# Patient Record
Sex: MALE | Race: WHITE | Employment: UNEMPLOYED | URBAN - METROPOLITAN AREA
[De-identification: names, ages, dates, MRNs, and addresses within clinical notes are randomized per-mention and may not be internally consistent; named-entity substitution may affect disease eponyms.]

---

## 2018-09-11 ENCOUNTER — HOSPITAL ENCOUNTER (INPATIENT)
Facility: CLINIC | Age: 33
LOS: 7 days | Discharge: PSYCHIATRIC OR SUBSTANCE USE FACILITY | DRG: 433 | End: 2018-09-18
Attending: EMERGENCY MEDICINE | Admitting: INTERNAL MEDICINE
Payer: COMMERCIAL

## 2018-09-11 ENCOUNTER — TRANSFERRED RECORDS (OUTPATIENT)
Dept: HEALTH INFORMATION MANAGEMENT | Facility: CLINIC | Age: 33
End: 2018-09-11

## 2018-09-11 DIAGNOSIS — F41.9 ANXIETY: ICD-10-CM

## 2018-09-11 DIAGNOSIS — E87.29 ALCOHOLIC KETOACIDOSIS: ICD-10-CM

## 2018-09-11 DIAGNOSIS — F32.0 MILD MAJOR DEPRESSION (H): ICD-10-CM

## 2018-09-11 DIAGNOSIS — F43.21 ADJUSTMENT DISORDER WITH DEPRESSED MOOD: ICD-10-CM

## 2018-09-11 DIAGNOSIS — E87.20 ACIDOSIS, METABOLIC: Primary | ICD-10-CM

## 2018-09-11 DIAGNOSIS — F10.939 ALCOHOL WITHDRAWAL SYNDROME WITH COMPLICATION (H): ICD-10-CM

## 2018-09-11 DIAGNOSIS — R03.0 ELEVATED BLOOD PRESSURE READING WITHOUT DIAGNOSIS OF HYPERTENSION: ICD-10-CM

## 2018-09-11 LAB
ALBUMIN SERPL-MCNC: 4.8 G/DL (ref 3.4–5)
ALBUMIN UR-MCNC: 30 MG/DL
ALCOHOL BREATH TEST: 0.07 (ref 0–0.01)
ALP SERPL-CCNC: 72 U/L (ref 40–150)
ALT SERPL W P-5'-P-CCNC: 106 U/L (ref 0–70)
AMPHETAMINES UR QL SCN: NEGATIVE
ANION GAP SERPL CALCULATED.3IONS-SCNC: 15 MMOL/L (ref 3–14)
ANION GAP SERPL CALCULATED.3IONS-SCNC: 22 MMOL/L (ref 3–14)
ANION GAP SERPL CALCULATED.3IONS-SCNC: 9 MMOL/L (ref 3–14)
APPEARANCE UR: CLEAR
AST SERPL W P-5'-P-CCNC: 86 U/L (ref 0–45)
BACTERIA #/AREA URNS HPF: ABNORMAL /HPF
BARBITURATES UR QL: NEGATIVE
BASOPHILS # BLD AUTO: 0 10E9/L (ref 0–0.2)
BASOPHILS NFR BLD AUTO: 0.6 %
BENZODIAZ UR QL: NEGATIVE
BILIRUB SERPL-MCNC: 1.4 MG/DL (ref 0.2–1.3)
BILIRUB UR QL STRIP: NEGATIVE
BUN SERPL-MCNC: 10 MG/DL (ref 7–30)
BUN SERPL-MCNC: 8 MG/DL (ref 7–30)
BUN SERPL-MCNC: 9 MG/DL (ref 7–30)
CALCIUM SERPL-MCNC: 7.9 MG/DL (ref 8.5–10.1)
CALCIUM SERPL-MCNC: 8.6 MG/DL (ref 8.5–10.1)
CALCIUM SERPL-MCNC: 9.8 MG/DL (ref 8.5–10.1)
CANNABINOIDS UR QL SCN: NEGATIVE
CHLORIDE SERPL-SCNC: 100 MMOL/L (ref 94–109)
CHLORIDE SERPL-SCNC: 105 MMOL/L (ref 94–109)
CHLORIDE SERPL-SCNC: 95 MMOL/L (ref 94–109)
CO2 SERPL-SCNC: 18 MMOL/L (ref 20–32)
CO2 SERPL-SCNC: 18 MMOL/L (ref 20–32)
CO2 SERPL-SCNC: 25 MMOL/L (ref 20–32)
COCAINE UR QL: NEGATIVE
COLOR UR AUTO: YELLOW
CREAT SERPL-MCNC: 0.77 MG/DL (ref 0.66–1.25)
CREAT SERPL-MCNC: 0.92 MG/DL (ref 0.66–1.25)
CREAT SERPL-MCNC: 0.93 MG/DL (ref 0.66–1.25)
DIFFERENTIAL METHOD BLD: NORMAL
EOSINOPHIL # BLD AUTO: 0 10E9/L (ref 0–0.7)
EOSINOPHIL NFR BLD AUTO: 0 %
ERYTHROCYTE [DISTWIDTH] IN BLOOD BY AUTOMATED COUNT: 12.2 % (ref 10–15)
ETHANOL SERPL-MCNC: <0.01 G/DL
ETHANOL UR QL SCN: NEGATIVE
GFR SERPL CREATININE-BSD FRML MDRD: >90 ML/MIN/1.7M2
GLUCOSE BLDC GLUCOMTR-MCNC: 241 MG/DL (ref 70–99)
GLUCOSE BLDC GLUCOMTR-MCNC: 318 MG/DL (ref 70–99)
GLUCOSE SERPL-MCNC: 135 MG/DL (ref 70–99)
GLUCOSE SERPL-MCNC: 446 MG/DL (ref 70–99)
GLUCOSE SERPL-MCNC: 65 MG/DL (ref 70–99)
GLUCOSE UR STRIP-MCNC: NEGATIVE MG/DL
HBA1C MFR BLD: 4.4 % (ref 0–5.6)
HCT VFR BLD AUTO: 47 % (ref 40–53)
HGB BLD-MCNC: 16.4 G/DL (ref 13.3–17.7)
HGB UR QL STRIP: NEGATIVE
HYALINE CASTS #/AREA URNS LPF: 1 /LPF (ref 0–2)
IMM GRANULOCYTES # BLD: 0 10E9/L (ref 0–0.4)
IMM GRANULOCYTES NFR BLD: 0.1 %
INR PPP: 0.98 (ref 0.86–1.14)
KETONES BLD-SCNC: 3.8 MMOL/L (ref 0–0.6)
KETONES UR STRIP-MCNC: >150 MG/DL
LEUKOCYTE ESTERASE UR QL STRIP: NEGATIVE
LIPASE SERPL-CCNC: 152 U/L (ref 73–393)
LYMPHOCYTES # BLD AUTO: 0.8 10E9/L (ref 0.8–5.3)
LYMPHOCYTES NFR BLD AUTO: 10.7 %
MAGNESIUM SERPL-MCNC: 1.6 MG/DL (ref 1.6–2.3)
MAGNESIUM SERPL-MCNC: 1.7 MG/DL (ref 1.6–2.3)
MCH RBC QN AUTO: 32.9 PG (ref 26.5–33)
MCHC RBC AUTO-ENTMCNC: 34.9 G/DL (ref 31.5–36.5)
MCV RBC AUTO: 94 FL (ref 78–100)
MONOCYTES # BLD AUTO: 0.5 10E9/L (ref 0–1.3)
MONOCYTES NFR BLD AUTO: 6.5 %
MUCOUS THREADS #/AREA URNS LPF: PRESENT /LPF
NEUTROPHILS # BLD AUTO: 5.8 10E9/L (ref 1.6–8.3)
NEUTROPHILS NFR BLD AUTO: 82.1 %
NITRATE UR QL: NEGATIVE
NRBC # BLD AUTO: 0 10*3/UL
NRBC BLD AUTO-RTO: 0 /100
OPIATES UR QL SCN: NEGATIVE
PH UR STRIP: 5.5 PH (ref 5–7)
PHOSPHATE SERPL-MCNC: 1.1 MG/DL (ref 2.5–4.5)
PHOSPHATE SERPL-MCNC: 1.7 MG/DL (ref 2.5–4.5)
PLATELET # BLD AUTO: 219 10E9/L (ref 150–450)
POTASSIUM SERPL-SCNC: 3.7 MMOL/L (ref 3.4–5.3)
POTASSIUM SERPL-SCNC: 4.3 MMOL/L (ref 3.4–5.3)
POTASSIUM SERPL-SCNC: 4.3 MMOL/L (ref 3.4–5.3)
PROT SERPL-MCNC: 8.6 G/DL (ref 6.8–8.8)
RBC # BLD AUTO: 4.98 10E12/L (ref 4.4–5.9)
RBC #/AREA URNS AUTO: 1 /HPF (ref 0–2)
SODIUM SERPL-SCNC: 133 MMOL/L (ref 133–144)
SODIUM SERPL-SCNC: 135 MMOL/L (ref 133–144)
SODIUM SERPL-SCNC: 139 MMOL/L (ref 133–144)
SOURCE: ABNORMAL
SP GR UR STRIP: 1.03 (ref 1–1.03)
UROBILINOGEN UR STRIP-MCNC: NORMAL MG/DL (ref 0–2)
WBC # BLD AUTO: 7 10E9/L (ref 4–11)
WBC #/AREA URNS AUTO: 2 /HPF (ref 0–5)

## 2018-09-11 PROCEDURE — 82075 ASSAY OF BREATH ETHANOL: CPT | Performed by: EMERGENCY MEDICINE

## 2018-09-11 PROCEDURE — 00000146 ZZHCL STATISTIC GLUCOSE BY METER IP

## 2018-09-11 PROCEDURE — 83735 ASSAY OF MAGNESIUM: CPT | Performed by: EMERGENCY MEDICINE

## 2018-09-11 PROCEDURE — 80307 DRUG TEST PRSMV CHEM ANLYZR: CPT | Performed by: EMERGENCY MEDICINE

## 2018-09-11 PROCEDURE — 96365 THER/PROPH/DIAG IV INF INIT: CPT | Performed by: EMERGENCY MEDICINE

## 2018-09-11 PROCEDURE — 85610 PROTHROMBIN TIME: CPT | Performed by: EMERGENCY MEDICINE

## 2018-09-11 PROCEDURE — 84100 ASSAY OF PHOSPHORUS: CPT | Performed by: EMERGENCY MEDICINE

## 2018-09-11 PROCEDURE — 83036 HEMOGLOBIN GLYCOSYLATED A1C: CPT | Performed by: EMERGENCY MEDICINE

## 2018-09-11 PROCEDURE — 25000128 H RX IP 250 OP 636: Performed by: EMERGENCY MEDICINE

## 2018-09-11 PROCEDURE — 99285 EMERGENCY DEPT VISIT HI MDM: CPT | Mod: Z6 | Performed by: EMERGENCY MEDICINE

## 2018-09-11 PROCEDURE — 83690 ASSAY OF LIPASE: CPT | Performed by: EMERGENCY MEDICINE

## 2018-09-11 PROCEDURE — 85025 COMPLETE CBC W/AUTO DIFF WBC: CPT | Performed by: EMERGENCY MEDICINE

## 2018-09-11 PROCEDURE — 12000001 ZZH R&B MED SURG/OB UMMC

## 2018-09-11 PROCEDURE — 96376 TX/PRO/DX INJ SAME DRUG ADON: CPT | Performed by: EMERGENCY MEDICINE

## 2018-09-11 PROCEDURE — 80320 DRUG SCREEN QUANTALCOHOLS: CPT | Performed by: EMERGENCY MEDICINE

## 2018-09-11 PROCEDURE — 81001 URINALYSIS AUTO W/SCOPE: CPT | Performed by: EMERGENCY MEDICINE

## 2018-09-11 PROCEDURE — 80048 BASIC METABOLIC PNL TOTAL CA: CPT | Performed by: INTERNAL MEDICINE

## 2018-09-11 PROCEDURE — 82010 KETONE BODYS QUAN: CPT | Performed by: EMERGENCY MEDICINE

## 2018-09-11 PROCEDURE — 84100 ASSAY OF PHOSPHORUS: CPT | Performed by: INTERNAL MEDICINE

## 2018-09-11 PROCEDURE — HZ2ZZZZ DETOXIFICATION SERVICES FOR SUBSTANCE ABUSE TREATMENT: ICD-10-PCS | Performed by: INTERNAL MEDICINE

## 2018-09-11 PROCEDURE — 25000125 ZZHC RX 250: Performed by: EMERGENCY MEDICINE

## 2018-09-11 PROCEDURE — 83735 ASSAY OF MAGNESIUM: CPT | Performed by: INTERNAL MEDICINE

## 2018-09-11 PROCEDURE — 80048 BASIC METABOLIC PNL TOTAL CA: CPT | Performed by: EMERGENCY MEDICINE

## 2018-09-11 PROCEDURE — 36415 COLL VENOUS BLD VENIPUNCTURE: CPT | Performed by: INTERNAL MEDICINE

## 2018-09-11 PROCEDURE — 80053 COMPREHEN METABOLIC PANEL: CPT | Performed by: EMERGENCY MEDICINE

## 2018-09-11 PROCEDURE — 99222 1ST HOSP IP/OBS MODERATE 55: CPT | Mod: AI | Performed by: INTERNAL MEDICINE

## 2018-09-11 PROCEDURE — 99285 EMERGENCY DEPT VISIT HI MDM: CPT | Mod: 25 | Performed by: EMERGENCY MEDICINE

## 2018-09-11 PROCEDURE — 96361 HYDRATE IV INFUSION ADD-ON: CPT | Performed by: EMERGENCY MEDICINE

## 2018-09-11 PROCEDURE — 96375 TX/PRO/DX INJ NEW DRUG ADDON: CPT | Performed by: EMERGENCY MEDICINE

## 2018-09-11 PROCEDURE — 25000132 ZZH RX MED GY IP 250 OP 250 PS 637: Performed by: INTERNAL MEDICINE

## 2018-09-11 RX ORDER — ONDANSETRON 4 MG/1
4 TABLET, ORALLY DISINTEGRATING ORAL EVERY 4 HOURS PRN
Status: DISCONTINUED | OUTPATIENT
Start: 2018-09-11 | End: 2018-09-18 | Stop reason: HOSPADM

## 2018-09-11 RX ORDER — MULTIPLE VITAMINS W/ MINERALS TAB 9MG-400MCG
1 TAB ORAL DAILY
Status: DISCONTINUED | OUTPATIENT
Start: 2018-09-12 | End: 2018-09-18 | Stop reason: HOSPADM

## 2018-09-11 RX ORDER — LORAZEPAM 2 MG/ML
1 INJECTION INTRAMUSCULAR ONCE
Status: COMPLETED | OUTPATIENT
Start: 2018-09-11 | End: 2018-09-11

## 2018-09-11 RX ORDER — FOLIC ACID 1 MG/1
1 TABLET ORAL DAILY
Status: DISCONTINUED | OUTPATIENT
Start: 2018-09-12 | End: 2018-09-18 | Stop reason: HOSPADM

## 2018-09-11 RX ORDER — LORAZEPAM 1 MG/1
1-4 TABLET ORAL EVERY 30 MIN PRN
Status: DISCONTINUED | OUTPATIENT
Start: 2018-09-11 | End: 2018-09-18 | Stop reason: HOSPADM

## 2018-09-11 RX ORDER — DIAZEPAM 5 MG
5-20 TABLET ORAL EVERY 30 MIN PRN
Status: DISCONTINUED | OUTPATIENT
Start: 2018-09-11 | End: 2018-09-12

## 2018-09-11 RX ORDER — LANOLIN ALCOHOL/MO/W.PET/CERES
100 CREAM (GRAM) TOPICAL DAILY
Status: COMPLETED | OUTPATIENT
Start: 2018-09-12 | End: 2018-09-14

## 2018-09-11 RX ORDER — ONDANSETRON 2 MG/ML
8 INJECTION INTRAMUSCULAR; INTRAVENOUS ONCE
Status: COMPLETED | OUTPATIENT
Start: 2018-09-11 | End: 2018-09-11

## 2018-09-11 RX ORDER — SODIUM CHLORIDE 9 MG/ML
1000 INJECTION, SOLUTION INTRAVENOUS CONTINUOUS
Status: DISCONTINUED | OUTPATIENT
Start: 2018-09-11 | End: 2018-09-12

## 2018-09-11 RX ADMIN — LORAZEPAM 1 MG: 2 INJECTION INTRAMUSCULAR; INTRAVENOUS at 12:42

## 2018-09-11 RX ADMIN — FOLIC ACID: 5 INJECTION, SOLUTION INTRAMUSCULAR; INTRAVENOUS; SUBCUTANEOUS at 12:43

## 2018-09-11 RX ADMIN — SODIUM CHLORIDE 1000 ML: 9 INJECTION, SOLUTION INTRAVENOUS at 16:51

## 2018-09-11 RX ADMIN — SODIUM CHLORIDE 1000 ML: 9 INJECTION, SOLUTION INTRAVENOUS at 16:06

## 2018-09-11 RX ADMIN — FAMOTIDINE 20 MG: 10 INJECTION, SOLUTION INTRAVENOUS at 12:42

## 2018-09-11 RX ADMIN — ONDANSETRON 8 MG: 2 INJECTION INTRAMUSCULAR; INTRAVENOUS at 12:42

## 2018-09-11 RX ADMIN — LORAZEPAM 1 MG: 2 INJECTION INTRAMUSCULAR; INTRAVENOUS at 14:06

## 2018-09-11 RX ADMIN — DEXTROSE AND SODIUM CHLORIDE: 5; 900 INJECTION, SOLUTION INTRAVENOUS at 14:08

## 2018-09-11 RX ADMIN — RANITIDINE 150 MG: 150 TABLET ORAL at 19:46

## 2018-09-11 ASSESSMENT — ACTIVITIES OF DAILY LIVING (ADL)
DRESS: 0-->INDEPENDENT
AMBULATION: 0 - INDEPENDENT
RETIRED_COMMUNICATION: 0-->UNDERSTANDS/COMMUNICATES WITHOUT DIFFICULTY
TRANSFERRING: 0-->INDEPENDENT
TOILETING: 0-->INDEPENDENT
RETIRED_EATING: 0-->INDEPENDENT
AMBULATION: 0-->INDEPENDENT
SWALLOWING: 0-->SWALLOWS FOODS/LIQUIDS WITHOUT DIFFICULTY
BATHING: 0-->INDEPENDENT
ADLS_ACUITY_SCORE: 11

## 2018-09-11 ASSESSMENT — ENCOUNTER SYMPTOMS
BLOOD IN STOOL: 0
NAUSEA: 1
ANAL BLEEDING: 0
FEVER: 0
VOMITING: 1

## 2018-09-11 NOTE — IP AVS SNAPSHOT
MRN:[id number]                      After Visit Summary   9/11/2018    Glenn Simons    MRN: [id number]           Thank you!     Thank you for choosing [facility] for your care. Our goal is always to provide you with excellent care. Hearing back from our patients is one way we can continue to improve our services. Please take a few minutes to complete the written survey that you may receive in the mail after you visit with us. Thank you!        Patient Information     Date Of Birth          1985        Designated Caregiver       Most Recent Value    Caregiver    Will someone help with your care after discharge? yes    Name of designated caregiver wife Netta    Phone number of caregiver [phone]    Caregiver address [address] [place] [address]      About your hospital stay     You were admitted on:  September 11, 2018 You last received care in the:  [facility]    You were discharged on:  September 18, 2018        Reason for your hospital stay       You were hospitalized for the treatment of alcohol abuse, with dehydration, nausea and vomiting                  Who to Call     For medical emergencies, please call 911.  For non-urgent questions about your medical care, please call your primary care provider or clinic, [phone]          Attending Provider     Provider Specialty    Joaquim Spain MD Emergency Medicine    [facility], Samir Shepard MD Internal Medicine       Primary Care Provider Office Phone # Fax #    Ruslan Johnson [phone]      After Care Instructions     Diet       Follow this diet upon discharge: Orders Placed This Encounter      Gluten Free Diet            Discharge Instructions       Monitor BP before taking amlodipine - do not take if /80 or less.  Appointment primary care provider within the week for recheck hepatic profile and BP follow up.                  Follow-up Appointments     Follow Up and recommended labs and tests  "      You should see your primary care provider within one month to blood tests to check your liver function                  Your next 10 appointments already scheduled     Sep 18, 2018 12:00 PM CDT   Treatment with LP/DOP ADMISSIONS   [facility] ([facility])    [facility] [address] [address]   [phone]              Pending Results     No orders found from 9/9/2018 to 9/12/2018.            Statement of Approval     Ordered          09/18/18 0956  I have reviewed and agree with all the recommendations and orders detailed in this document.     Approved and electronically signed by:  Felton Branch MD           09/18/18 1047  I have reviewed and agree with all the recommendations and orders detailed in this document.     Approved and electronically signed by:  Felton Branch MD           09/16/18 0832  I have reviewed and agree with all the recommendations and orders detailed in this document.     Approved and electronically signed by:  Samir Starks MD           09/14/18 5590  I have reviewed and agree with all the recommendations and orders detailed in this document.  EFFECTIVE NOW     Approved and electronically signed by:  Samir Starks MD             Admission Information     Date & Time Provider Department Dept. Phone    9/11/2018 Samir Starks MD [facility] [phone]      Your Vitals Were     Blood Pressure Pulse Temperature Respirations Height Weight    142/99 (BP Location: Right arm) 66 97.1  F (36.2  C) 16 1.778 m (5' 10\") 63.5 kg (140 lb)    Pulse Oximetry BMI (Body Mass Index)                99% 20.09 kg/m2          [employer] Information     [employer] lets you send messages to your doctor, view your test results, renew your prescriptions, schedule appointments and more. To sign up, go to [email or web address] . Click on \"Log in\" on the left side of the screen, which will take you to the " "Welcome page. Then click on \"Sign up Now\" on the right side of the page.     You will be asked to enter the access code listed below, as well as some personal information. Please follow the directions to create your username and password.     Your access code is: [email or web address]  Expires: 12/10/2018  6:03 PM     Your access code will  in 90 days. If you need help or a new code, please call your [facility] clinic or [phone].        Care EveryWhere ID     This is your Care EveryWhere ID. This could be used by other organizations to access your [facility] medical records  [id number]        Equal Access to Services     CHULA REYES : Kraig Waggoner, lenin aquino, jacquie haji, ronn olivera. So [facility] [phone].    ATENCIÓN: Si habla español, tiene a buchanan disposición servicios gratuitos de asistencia lingüística. Llame al [phone].    We comply with applicable federal civil rights laws and [place] laws. We do not discriminate on the basis of race, color, national origin, age, disability, sex, sexual orientation, or gender identity.               Review of your medicines      START taking        Dose / Directions    amLODIPine 5 MG tablet   Commonly known as:  NORVASC   Used for:  Elevated blood pressure reading without diagnosis of hypertension        Dose:  5 mg   Start taking on:  2018   Take 1 tablet (5 mg) by mouth daily   Quantity:  30 tablet   Refills:  0       escitalopram 10 MG tablet   Commonly known as:  LEXAPRO   Used for:  Mild major depression (H)        Dose:  10 mg   Take 1 tablet (10 mg) by mouth daily   Quantity:  30 tablet   Refills:  1       hydrOXYzine 25 MG tablet   Commonly known as:  ATARAX   Used for:  Anxiety        Dose:  25 mg   Take 1 tablet (25 mg) by mouth every 6 hours as needed for anxiety or other (adjuvant pain)   Quantity:  15 tablet   Refills:  0       multivitamin, therapeutic with minerals Tabs tablet        " Dose:  1 tablet   Take 1 tablet by mouth daily   Quantity:  30 each   Refills:  0       thiamine 100 MG tablet        Dose:  100 mg   Take 1 tablet (100 mg) by mouth daily   Quantity:  30 tablet   Refills:  0            Where to get your medicines      These medications were sent to [facility] [place], [place] - [address]  [address], [facility] [address]     Phone:  [phone]     amLODIPine 5 MG tablet    escitalopram 10 MG tablet    hydrOXYzine 25 MG tablet    multivitamin, therapeutic with minerals Tabs tablet    thiamine 100 MG tablet                Protect others around you: Learn how to safely use, store and throw away your medicines at www.disposemymeds.org.             Medication List: This is a list of all your medications and when to take them. Check marks below indicate your daily home schedule. Keep this list as a reference.      Medications           Morning Afternoon Evening Bedtime As Needed    amLODIPine 5 MG tablet   Commonly known as:  NORVASC   Take 1 tablet (5 mg) by mouth daily   Start taking on:  9/19/2018   Last time this was given:  5 mg on 9/18/2018  8:22 AM                                escitalopram 10 MG tablet   Commonly known as:  LEXAPRO   Take 1 tablet (10 mg) by mouth daily   Last time this was given:  10 mg on 9/18/2018  8:19 AM                                hydrOXYzine 25 MG tablet   Commonly known as:  ATARAX   Take 1 tablet (25 mg) by mouth every 6 hours as needed for anxiety or other (adjuvant pain)   Last time this was given:  25 mg on 9/17/2018 11:39 AM                                multivitamin, therapeutic with minerals Tabs tablet   Take 1 tablet by mouth daily   Last time this was given:  1 tablet on 9/18/2018  8:19 AM                                thiamine 100 MG tablet   Take 1 tablet (100 mg) by mouth daily   Last time this was given:  100 mg on 9/16/2018  8:08 AM

## 2018-09-11 NOTE — H&P
"Admitted:     09/11/2018      Glenn Simons is a 32-year-old male with a history of chemical abuse involving alcohol, as well as celiac disease, who is being admitted from the emergency room to the medical unit for evaluation of nausea, vomiting, dehydration and metabolic acidosis in the setting of ongoing alcohol use.  The patient states he drinks approximately 1.75 liter of maude every 3 days and has done so for several years.  His last period of abstinence was in April when he was in detox for several days.  He has been through treatment on one occasion several years ago with only a temporary abstinence.      The patient states that he strongly desires chemical dependency treatment to stop drinking.  He was evaluated, in fact, this morning at [facility] for chemical dependency.  He was told that they did not have room for him for several weeks.  He subsequently went home and drank more alcohol.  His last use of alcohol was approximately 10:30 this morning.  The patient subsequently had several episodes of emesis after which his wife brought him to the emergency room.  The wife states that \"I have had enough.\"  In the ER, the patient was found to have an anion gap of 22, a bicarbonate of 18, creatinine 0.92, potassium 4.3, AST was 86, ALT of 106.  Glucose was 65.  Lipase 152.  The patient was given 2 liters of dextrose containing fluids after which his glucose was noted to be in the 300s.  Anion gap subsequently narrowed from 22 to 15.  Quantitative ketones at that time were elevated at 3.8.      The patient was seen by me after he had received 2 liters of fluid.  He states he feels improved.  Denies nausea and now states he is hungry.  He states that the nausea, vomiting started this morning.  He does have poor appetite with occasional nausea as well as occasional diarrhea which he believes is related to alcohol use and less likely to celiac disease.  He denies knowledge of history of liver dysfunction, " pancreatitis, alcohol withdrawal seizures.  He states his appetite has been poor and that he has lost unknown amount of weight over the last year, he presumes, secondary to poor diet in the setting of alcohol use.  The patient states that he does feel depressed.  He and his wife feel this is likely secondary to alcohol use and to his chronic diagnosis of celiac disease and that he is not able to eat foods that he enjoys.      As above, the patient and his wife are very interested in his pursuing CD treatment.      PAST MEDICAL HISTORY:  The patient's past medical history is remarkable for   1.  Chemical abuse involving alcohol as noted above.   2.  Celiac disease with sequellae of intermittent diarrhea.   3.  History of hemorrhoids.   4.  Cigarette abuse.  The patient denies knowledge of cardiac, pulmonary disease.      MEDICATIONS PRIOR TO ADMISSION:  None.      ALLERGIES TO MEDICATION ALLERGIES:  AMOXICILLIN, TYLENOL WITH CODEINE, PLAIN TYLENOL.      HABITS:  He does smoke cigarettes, approximately 3-4 cigarettes per day.  Denies other chemical use.      FAMILY HISTORY:  The patient notes that multiple relatives have had chemical dependency issues.  His parents, he states are social drinkers.  His brother has a gambling addiction, which is under control.      REVIEW OF SYSTEMS:  A 12-point review of systems is negative except as noted above.      PHYSICAL EXAMINATION:   GENERAL:  He is a pleasant, thin appearing male who is lying comfortably in bed in the emergency room.   VITAL SIGNS:  His temperature is 99.2 degrees, blood pressure has been in the 140s to 160s over 190 to 100s.  Heart rates have been in the 90s.   HEENT:  Pupils are 2.5 mm bilaterally.  Pharynx benign.  Sclerae are anicteric.  Oral mucosa is moist.  Teeth are in fair repair.   NECK:  There is no adenopathy or thyromegaly.   LUNGS:  Clear.   CARDIAC:  Regular rate and rhythm without murmurs.   ABDOMEN:  Soft, nontender, nondistended, no  organomegaly.   EXTREMITIES:  There was no sign of trauma.   NEUROLOGIC:  He is alert, pleasant and cooperative, fully oriented.  There was a faint tremor of both hands.  Insight appears to be reasonably good.      LABORATORY STUDIES:  As described above.  CBC is unremarkable.  INR 0.98.  Bilirubin was slightly elevated at 1.4.      ASSESSMENT:   1.  Nausea, vomiting, anion gap acidosis, likely representing alcoholic starvation ketosis in the setting of ongoing alcohol use.  Anion gap has narrowed somewhat with dextrose.  He does have hyperglycemia, most likely as a result of IV dextrose, particularly due to the fact that his glucose is 65 prior to the receipt of IV fluids.  Liver function tests are mildly abnormal as noted above.  INR is normal.  The patient is hemodynamically stable and does appear improved after receiving 2 liters of fluid.  There is currently no evidence of alcohol withdrawal.  The patient denies a history of significant alcohol withdrawal.   2.  Depression in the setting of chronic alcohol use.  The patient would be interested in seeing a psychiatrist.   3.  Celiac disease managed with diet.      PLAN:  The patient is being admitted to the medical unit.  He will be continued on IV dextrose.  A BMP will be repeated later today to assure continued normalization of the anion gap and to monitor his blood glucose.  He will be started on a gluten-free diet.  He will be started empirically on H2 blocker therapy and be started on MSSA alcohol withdrawal protocol with lorazepam.  Activity will be up as tolerated with assistance.  The patient will benefit from a psychiatry consultation.  The chemical dependency consultation will be requested as well as the patient and wife are very interested in his pursuing chemical dependency treatment.      My assessment and plan were discussed with the patient and his wife, both of whom are in agreement.  The patient will be admitted on a voluntary basis to the  medical unit.         MARIN KIM MD             D: 2018   T: 2018   MT: JIGAR      Name:     JAMES LOW   MRN:      [id number]        Account:      [id number]   :      1985        Admitted:     2018                   Document: [id number]

## 2018-09-11 NOTE — IP AVS SNAPSHOT
[facility]    [address]    [place] [address]    Phone:  [phone]                                       After Visit Summary   9/11/2018    Glenn Simons    MRN: [id number]           After Visit Summary Signature Page     I have received my discharge instructions, and my questions have been answered. I have discussed any challenges I see with this plan with the nurse or doctor.    ..........................................................................................................................................  Patient/Patient Representative Signature      ..........................................................................................................................................  Patient Representative Print Name and Relationship to Patient    ..................................................               ................................................  Date                                   Time    ..........................................................................................................................................  Reviewed by Signature/Title    ...................................................              ..............................................  Date                                               Time          22EPIC Rev 08/18

## 2018-09-11 NOTE — ED PROVIDER NOTES
History     Chief Complaint   Patient presents with     Nausea & Vomiting     pt c/o vomiting, started within the last several hours, pt left chemical dependency evaluation, went home and started vomiting, denies any blood. last ETOH drink 30min PTA 1100. pt alert, with tremors.      Alcohol Intoxication     HPI  Glenn Simons is a 32-year-old male who presents with his significant other for evaluation of his nausea and vomiting.  The patient states that he just had an evaluation at [facility] this morning for chemical dependency program.  He had blood drawn and was told that they would not have room for him for approximately 3 weeks.  Patient states he has been drinking 1.75 L of maude about every 3 days.  Patient states he has been doing this for a number of years and states he has never had any complications of pancreatitis or hepatitis that he is aware of.  Patient states that he got home from [place] this morning and started to vomit.  He denies hematemesis denies any melena or bright blood per rectum.  The patient states that his last drink was prior to going to [place] this morning approximately 5 hours ago.  Patient states that he has been through treatment one other time in the past but has been drinking for a number of years.  He denies any other recreational drug use.  He denies any suicidal or homicidal ideation.    I have reviewed the Medications, Allergies, Past Medical and Surgical History, and Social History in the [employer] system.    Past Medical History:   Diagnosis Date     Celiac disease        No past surgical history on file.    No family history on file.    Social History   Substance Use Topics     Smoking status: Current Every Day Smoker     Packs/day: 0.25     Years: 10.00     Smokeless tobacco: Not on file     Alcohol use Yes      Comment: Hasn't drank since 12/13/15, Drank 0.5L daily prior to that for 3+ years        Previous Medications    No medications on file        Allergies  "  Allergen Reactions     Amoxicillin Unknown     Tylenol W/Codeine [Acetaminophen-Codeine] Unknown     Tylenol [Acetaminophen] Unknown     Wheat Bran GI Disturbance      Review of Systems   Constitutional: Negative for fever.   Gastrointestinal: Positive for nausea and vomiting. Negative for anal bleeding and blood in stool.   Psychiatric/Behavioral: Negative for self-injury and suicidal ideas.   All other systems reviewed and are negative.    Physical Exam   BP: (!) 163/115  Pulse: 96  Temp: 99.2  F (37.3  C)  Resp: 22  Height: 177.8 cm (5' 10\")  Weight: 63.5 kg (140 lb)  SpO2: 96 %    Physical Exam   Constitutional: He is oriented to person, place, and time.   Alert conversant tremulous   HENT:   Head: Atraumatic.   Eyes: EOM are normal. Pupils are equal, round, and reactive to light.   Neck: Neck supple.   Cardiovascular: Regular rhythm.    Pulmonary/Chest: Breath sounds normal.   Abdominal: Soft. There is no tenderness.   Musculoskeletal: He exhibits no edema or tenderness.   Neurological: He is alert and oriented to person, place, and time. No cranial nerve deficit.   Tremulous bilaterally with tongue fasciculations as well   Skin: Skin is warm.   Psychiatric: He has a normal mood and affect.       ED Course     ED Course     Procedures          Patient an IV placed for medications and IV fluids.    Results for orders placed or performed during the hospital encounter of 09/11/18   CBC with platelets differential   Result Value Ref Range    WBC 7.0 4.0 - 11.0 10e9/L    RBC Count 4.98 4.4 - 5.9 10e12/L    Hemoglobin 16.4 13.3 - 17.7 g/dL    Hematocrit 47.0 40.0 - 53.0 %    MCV 94 78 - 100 fl    MCH 32.9 26.5 - 33.0 pg    MCHC 34.9 31.5 - 36.5 g/dL    RDW 12.2 10.0 - 15.0 %    Platelet Count 219 150 - 450 10e9/L    Diff Method Automated Method     % Neutrophils 82.1 %    % Lymphocytes 10.7 %    % Monocytes 6.5 %    % Eosinophils 0.0 %    % Basophils 0.6 %    % Immature Granulocytes 0.1 %    Nucleated RBCs 0 0 /100 "    Absolute Neutrophil 5.8 1.6 - 8.3 10e9/L    Absolute Lymphocytes 0.8 0.8 - 5.3 10e9/L    Absolute Monocytes 0.5 0.0 - 1.3 10e9/L    Absolute Eosinophils 0.0 0.0 - 0.7 10e9/L    Absolute Basophils 0.0 0.0 - 0.2 10e9/L    Abs Immature Granulocytes 0.0 0 - 0.4 10e9/L    Absolute Nucleated RBC 0.0    INR   Result Value Ref Range    INR 0.98 0.86 - 1.14   Comprehensive metabolic panel   Result Value Ref Range    Sodium 135 133 - 144 mmol/L    Potassium 4.3 3.4 - 5.3 mmol/L    Chloride 95 94 - 109 mmol/L    Carbon Dioxide 18 (L) 20 - 32 mmol/L    Anion Gap 22 (H) 3 - 14 mmol/L    Glucose 65 (L) 70 - 99 mg/dL    Urea Nitrogen 10 7 - 30 mg/dL    Creatinine 0.92 0.66 - 1.25 mg/dL    GFR Estimate >90 >60 mL/min/1.7m2    GFR Estimate If Black >90 >60 mL/min/1.7m2    Calcium 9.8 8.5 - 10.1 mg/dL    Bilirubin Total 1.4 (H) 0.2 - 1.3 mg/dL    Albumin 4.8 3.4 - 5.0 g/dL    Protein Total 8.6 6.8 - 8.8 g/dL    Alkaline Phosphatase 72 40 - 150 U/L     (H) 0 - 70 U/L    AST 86 (H) 0 - 45 U/L   Lipase   Result Value Ref Range    Lipase 152 73 - 393 U/L   UA reflex to Microscopic and Culture   Result Value Ref Range    Color Urine Yellow     Appearance Urine Clear     Glucose Urine Negative NEG^Negative mg/dL    Bilirubin Urine Negative NEG^Negative    Ketones Urine >150 (A) NEG^Negative mg/dL    Specific Gravity Urine 1.027 1.003 - 1.035    Blood Urine Negative NEG^Negative    pH Urine 5.5 5.0 - 7.0 pH    Protein Albumin Urine 30 (A) NEG^Negative mg/dL    Urobilinogen mg/dL Normal 0.0 - 2.0 mg/dL    Nitrite Urine Negative NEG^Negative    Leukocyte Esterase Urine Negative NEG^Negative    Source Midstream Urine     RBC Urine 1 0 - 2 /HPF    WBC Urine 2 0 - 5 /HPF    Bacteria Urine Few (A) NEG^Negative /HPF    Mucous Urine Present (A) NEG^Negative /LPF    Hyaline Casts 1 0 - 2 /LPF   Drug abuse screen 6 urine (tox)   Result Value Ref Range    Amphetamine Qual Urine Negative NEG^Negative    Barbiturates Qual Urine Negative  NEG^Negative    Benzodiazepine Qual Urine Negative NEG^Negative    Cannabinoids Qual Urine Negative NEG^Negative    Cocaine Qual Urine Negative NEG^Negative    Ethanol Qual Urine Negative NEG^Negative    Opiates Qualitative Urine Negative NEG^Negative   Alcohol level blood   Result Value Ref Range    Ethanol g/dL <0.01 <0.01 g/dL   Basic metabolic panel   Result Value Ref Range    Sodium 133 133 - 144 mmol/L    Potassium 4.3 3.4 - 5.3 mmol/L    Chloride 100 94 - 109 mmol/L    Carbon Dioxide 18 (L) 20 - 32 mmol/L    Anion Gap 15 (H) 3 - 14 mmol/L    Glucose 446 (H) 70 - 99 mg/dL    Urea Nitrogen 9 7 - 30 mg/dL    Creatinine 0.77 0.66 - 1.25 mg/dL    GFR Estimate >90 >60 mL/min/1.7m2    GFR Estimate If Black >90 >60 mL/min/1.7m2    Calcium 7.9 (L) 8.5 - 10.1 mg/dL   Ketone Beta-Hydroxybutyrate Quantitative   Result Value Ref Range    Ketone Quantitative 3.8 (HH) 0.0 - 0.6 mmol/L   Glucose by meter   Result Value Ref Range    Glucose 318 (H) 70 - 99 mg/dL   Alcohol breath test POCT   Result Value Ref Range    Alcohol Breath Test 0.07 (A) 0.00 - 0.01       Labs Ordered and Resulted from Time of ED Arrival Up to the Time of Departure from the ED   COMPREHENSIVE METABOLIC PANEL - Abnormal; Notable for the following:        Result Value    Carbon Dioxide 18 (*)     Anion Gap 22 (*)     Glucose 65 (*)     Bilirubin Total 1.4 (*)      (*)     AST 86 (*)     All other components within normal limits   UA MACROSCOPIC WITH REFLEX TO MICRO AND CULTURE - Abnormal; Notable for the following:     Ketones Urine >150 (*)     Protein Albumin Urine 30 (*)     Bacteria Urine Few (*)     Mucous Urine Present (*)     All other components within normal limits   BASIC METABOLIC PANEL - Abnormal; Notable for the following:     Carbon Dioxide 18 (*)     Anion Gap 15 (*)     Glucose 446 (*)     Calcium 7.9 (*)     All other components within normal limits   KETONE BETA-HYDROXYBUTYRATE QUANTITATIVE - Abnormal; Notable for the following:      Ketone Quantitative 3.8 (*)     All other components within normal limits   ALCOHOL BREATH TEST POCT - Abnormal; Notable for the following:     Alcohol Breath Test 0.07 (*)     All other components within normal limits   CBC WITH PLATELETS DIFFERENTIAL   INR   LIPASE   DRUG ABUSE SCREEN 6 CHEM DEP URINE ([facility])   ALCOHOL ETHYL   GLUCOSE MONITOR NURSING POCT   HEMOGLOBIN A1C   PERIPHERAL IV CATHETER       Assessments & Plan (with Medical Decision Making)     I have reviewed the nursing notes.    Medications   0.9% sodium chloride BOLUS (not administered)   sodium chloride 0.9% infusion (not administered)   insulin aspart (NovoLOG) inj (RAPID ACTING) (not administered)   ondansetron (ZOFRAN) injection 8 mg (8 mg Intravenous Given 9/11/18 1242)   dextrose 5% and 0.9% NaCl 1,000 mL with INFUVITE ADULT 10 mL, thiamine 100 mg, folic acid 1 mg infusion ( Intravenous Stopped 9/11/18 1407)   famotidine (PEPCID) injection 20 mg (20 mg Intravenous Given 9/11/18 1242)   LORazepam (ATIVAN) injection 1 mg (1 mg Intravenous Given 9/11/18 1242)   dextrose 5% and 0.9% NaCl 1,000 mL infusion ( Intravenous Stopped 9/11/18 1521)   LORazepam (ATIVAN) injection 1 mg (1 mg Intravenous Given 9/11/18 1406)       Patient initially had an anion gap of 22 and with his mild alcohol withdrawal was an alcoholic ketoacidosis.  The patient received 2 L of D5 normal saline 1 and some banana bag with vitamins included.  His initial glucose was 65.  Upon recheck his glucose was in the 400s while by Accu-Chek at the bedside it was in the 300s confirming his new hyperglycemia and presumed glucose intolerance.  Hemoglobin A1c was added to the patient's blood work.  At this time with the patient's glucose in the 300s with his anion gap improving from 22 to 15 but with ketones as well, the patient will be admitted to the hospitalist service instead of detox.  He will receive 5 units NovoLog subcu along with additional normal saline to help clear his  ketones and reduce his glucose.    I have reviewed the findings, diagnosis, and plan with the patient.    Final diagnoses:   Alcoholic ketoacidosis     Joaquim Spain MD    I, Sudhir Thomas, am serving as a trained medical scribe to document services personally performed by Joaquim Spain MD, based on the provider's statements to me.   IJoaquim MD, was physically present and have reviewed and verified the accuracy of this note documented by Sudhir Thomas.     9/11/2018   [facility], [place], EMERGENCY DEPARTMENT     Joaquim Spain MD  09/11/18 [id number]

## 2018-09-11 NOTE — PHARMACY-ADMISSION MEDICATION HISTORY
Admission Medication History status for the 9/11/2018 admission is complete.  See EPIC admission navigator for Prior to Admission medications.    Medication history interview sources:  Patient     Medication history source reliability: Good    Patient reports taking no prescription or over-the-counter medications    Time spent in this activity: 5 minutes    Medication history completed by: RENÉE Weaver    Prior to Admission medications    Not on File

## 2018-09-11 NOTE — ED NOTES
[facility], [place]   ED Nurse to Floor Handoff     Glenn Simons is a 32 year old male who speaks English and lives with a spouse,  in a home  They arrived in the ED by car from emergency room    ED Chief Complaint: Nausea & Vomiting (pt c/o vomiting, started within the last several hours, pt left chemical dependency evaluation, went home and started vomiting, denies any blood. last ETOH drink 30min PTA 1100. pt alert, with tremors. ) and Alcohol Intoxication    ED Dx;   Final diagnoses:   Alcoholic ketoacidosis         Needed?: No    Allergies:   Allergies   Allergen Reactions     Amoxicillin Unknown     Tylenol W/Codeine [Acetaminophen-Codeine] Unknown     Tylenol [Acetaminophen] Unknown     Wheat Bran GI Disturbance   .  Past Medical Hx:   Past Medical History:   Diagnosis Date     Celiac disease       Baseline Mental status: WDL  Current Mental Status changes: at basesline    Infection present or suspected this encounter: no  Sepsis suspected: No  Isolation type: No active isolations     Activity level - Baseline/Home:  Independent  Activity Level - Current:   Independent    Bariatric equipment needed?: No    In the ED these meds were given:   Medications   0.9% sodium chloride BOLUS (1,000 mLs Intravenous New Bag 9/11/18 1606)   sodium chloride 0.9% infusion (not administered)   insulin aspart (NovoLOG) inj (RAPID ACTING) (not administered)   ondansetron (ZOFRAN) injection 8 mg (8 mg Intravenous Given 9/11/18 1242)   dextrose 5% and 0.9% NaCl 1,000 mL with INFUVITE ADULT 10 mL, thiamine 100 mg, folic acid 1 mg infusion ( Intravenous Stopped 9/11/18 1407)   famotidine (PEPCID) injection 20 mg (20 mg Intravenous Given 9/11/18 1242)   LORazepam (ATIVAN) injection 1 mg (1 mg Intravenous Given 9/11/18 1242)   dextrose 5% and 0.9% NaCl 1,000 mL infusion ( Intravenous Stopped 9/11/18 1521)   LORazepam (ATIVAN) injection 1 mg (1 mg Intravenous Given 9/11/18 1406)       Drips  "running?  Yes    Home pump  No    Current LDAs  Peripheral IV 09/11/18 Left (Active)   Site Assessment WDL 9/11/2018 12:41 PM   Line Status Saline locked 9/11/2018 12:41 PM   Number of days:0       Labs results:   Labs Ordered and Resulted from Time of ED Arrival Up to the Time of Departure from the ED   COMPREHENSIVE METABOLIC PANEL - Abnormal; Notable for the following:        Result Value    Carbon Dioxide 18 (*)     Anion Gap 22 (*)     Glucose 65 (*)     Bilirubin Total 1.4 (*)      (*)     AST 86 (*)     All other components within normal limits   UA MACROSCOPIC WITH REFLEX TO MICRO AND CULTURE - Abnormal; Notable for the following:     Ketones Urine >150 (*)     Protein Albumin Urine 30 (*)     Bacteria Urine Few (*)     Mucous Urine Present (*)     All other components within normal limits   BASIC METABOLIC PANEL - Abnormal; Notable for the following:     Carbon Dioxide 18 (*)     Anion Gap 15 (*)     Glucose 446 (*)     Calcium 7.9 (*)     All other components within normal limits   KETONE BETA-HYDROXYBUTYRATE QUANTITATIVE - Abnormal; Notable for the following:     Ketone Quantitative 3.8 (*)     All other components within normal limits   GLUCOSE BY METER - Abnormal; Notable for the following:     Glucose 318 (*)     All other components within normal limits   ALCOHOL BREATH TEST POCT - Abnormal; Notable for the following:     Alcohol Breath Test 0.07 (*)     All other components within normal limits   CBC WITH PLATELETS DIFFERENTIAL   INR   LIPASE   DRUG ABUSE SCREEN 6 CHEM [facility]P URINE ([facility])   ALCOHOL ETHYL   GLUCOSE MONITOR NURSING POCT   HEMOGLOBIN A1C   PERIPHERAL IV CATHETER       Imaging Studies: No results found for this or any previous visit (from the past 24 hour(s)).    Recent vital signs:   BP (!) 142/95  Pulse 97  Temp 99.2  F (37.3  C) (Oral)  Resp 18  Ht 1.778 m (5' 10\")  Wt 63.5 kg (140 lb)  SpO2 97%  BMI 20.09 kg/m2    Cardiac Rhythm: Normal Sinus  Pt needs tele? No  Skin/wound " Issues: None    Code Status: Full Code    Pain control: pt had none    Nausea control: pt had none    Abnormal labs/tests/findings requiring intervention: glucose and ketones    Family present during ED course? Yes   Family Comments/Social Situation comments:     Tasks needing completion: None    Cee Antonio RN  [facility]--   [phone] [facility]  [phone] [facility]

## 2018-09-12 LAB
ALBUMIN SERPL-MCNC: 3.8 G/DL (ref 3.4–5)
ALP SERPL-CCNC: 63 U/L (ref 40–150)
ALT SERPL W P-5'-P-CCNC: 114 U/L (ref 0–70)
ANION GAP SERPL CALCULATED.3IONS-SCNC: 11 MMOL/L (ref 3–14)
AST SERPL W P-5'-P-CCNC: 112 U/L (ref 0–45)
BILIRUB SERPL-MCNC: 2 MG/DL (ref 0.2–1.3)
BUN SERPL-MCNC: 6 MG/DL (ref 7–30)
CALCIUM SERPL-MCNC: 9 MG/DL (ref 8.5–10.1)
CHLORIDE SERPL-SCNC: 102 MMOL/L (ref 94–109)
CO2 SERPL-SCNC: 26 MMOL/L (ref 20–32)
CREAT SERPL-MCNC: 0.86 MG/DL (ref 0.66–1.25)
GFR SERPL CREATININE-BSD FRML MDRD: >90 ML/MIN/1.7M2
GLUCOSE SERPL-MCNC: 103 MG/DL (ref 70–99)
PHOSPHATE SERPL-MCNC: 4.1 MG/DL (ref 2.5–4.5)
POTASSIUM SERPL-SCNC: 3.7 MMOL/L (ref 3.4–5.3)
PROT SERPL-MCNC: 6.9 G/DL (ref 6.8–8.8)
SODIUM SERPL-SCNC: 139 MMOL/L (ref 133–144)

## 2018-09-12 PROCEDURE — 84100 ASSAY OF PHOSPHORUS: CPT | Performed by: INTERNAL MEDICINE

## 2018-09-12 PROCEDURE — 99222 1ST HOSP IP/OBS MODERATE 55: CPT | Performed by: PSYCHIATRY & NEUROLOGY

## 2018-09-12 PROCEDURE — 80053 COMPREHEN METABOLIC PANEL: CPT | Performed by: INTERNAL MEDICINE

## 2018-09-12 PROCEDURE — 36415 COLL VENOUS BLD VENIPUNCTURE: CPT | Performed by: INTERNAL MEDICINE

## 2018-09-12 PROCEDURE — 12000001 ZZH R&B MED SURG/OB UMMC

## 2018-09-12 PROCEDURE — 25000128 H RX IP 250 OP 636: Performed by: EMERGENCY MEDICINE

## 2018-09-12 PROCEDURE — 25000132 ZZH RX MED GY IP 250 OP 250 PS 637: Performed by: INTERNAL MEDICINE

## 2018-09-12 PROCEDURE — 25000125 ZZHC RX 250: Performed by: INTERNAL MEDICINE

## 2018-09-12 PROCEDURE — 99232 SBSQ HOSP IP/OBS MODERATE 35: CPT | Performed by: INTERNAL MEDICINE

## 2018-09-12 PROCEDURE — 25000128 H RX IP 250 OP 636: Performed by: INTERNAL MEDICINE

## 2018-09-12 RX ORDER — LOPERAMIDE HCL 2 MG
2 CAPSULE ORAL 4 TIMES DAILY PRN
Status: DISCONTINUED | OUTPATIENT
Start: 2018-09-12 | End: 2018-09-18 | Stop reason: HOSPADM

## 2018-09-12 RX ADMIN — LORAZEPAM 2 MG: 1 TABLET ORAL at 11:41

## 2018-09-12 RX ADMIN — POTASSIUM & SODIUM PHOSPHATES POWDER PACK 280-160-250 MG 2 PACKET: 280-160-250 PACK at 08:10

## 2018-09-12 RX ADMIN — MULTIPLE VITAMINS W/ MINERALS TAB 1 TABLET: TAB at 08:10

## 2018-09-12 RX ADMIN — LORAZEPAM 1 MG: 1 TABLET ORAL at 16:25

## 2018-09-12 RX ADMIN — RANITIDINE 150 MG: 150 TABLET ORAL at 21:34

## 2018-09-12 RX ADMIN — POTASSIUM & SODIUM PHOSPHATES POWDER PACK 280-160-250 MG 2 PACKET: 280-160-250 PACK at 14:52

## 2018-09-12 RX ADMIN — POTASSIUM PHOSPHATE, MONOBASIC AND POTASSIUM PHOSPHATE, DIBASIC 20 MMOL: 224; 236 INJECTION, SOLUTION INTRAVENOUS at 03:11

## 2018-09-12 RX ADMIN — LOPERAMIDE HYDROCHLORIDE 2 MG: 2 CAPSULE ORAL at 11:46

## 2018-09-12 RX ADMIN — Medication 100 MG: at 08:10

## 2018-09-12 RX ADMIN — SODIUM CHLORIDE 1000 ML: 9 INJECTION, SOLUTION INTRAVENOUS at 02:06

## 2018-09-12 RX ADMIN — POTASSIUM & SODIUM PHOSPHATES POWDER PACK 280-160-250 MG 2 PACKET: 280-160-250 PACK at 21:34

## 2018-09-12 RX ADMIN — RANITIDINE 150 MG: 150 TABLET ORAL at 08:10

## 2018-09-12 RX ADMIN — FOLIC ACID 1 MG: 1 TABLET ORAL at 08:10

## 2018-09-12 ASSESSMENT — ACTIVITIES OF DAILY LIVING (ADL)
ADLS_ACUITY_SCORE: 11
ADLS_ACUITY_SCORE: 11
ADLS_ACUITY_SCORE: 9

## 2018-09-12 NOTE — CONSULTS
[facility], [place]  Psychiatry Consultaion      Patient name: Glenn Simons    MRN: [id number]    Age: 32 year old    YOB: 1985    Identifying information:   The patient is a 32 year old   male who is currently unemployed and resides at home with his wife.  He is currently admitted to the medical service on unit 10A.    Reason for consult: Evaluate depressed mood and alcohol dependence    History of present illness: The patient is a history of alcohol dependence who presented to the emergency room reporting intense episodes of nausea and vomiting with difficulty maintaining adequate hydration and food intake.  His alcohol breath test on arrival was 0.07 with his urine drug screen negative for other illicit substances.  His transaminases were elevated and thought to be secondary to excessive alcohol use.  He was admitted to the medical service for stabilization while being detoxed from alcohol.  Psychiatric consultation was requested to further evaluate his mood and help guide treatment of alcohol dependence.  On examination today, the patient recalls first noting escalation of his alcohol usage around 2012.  Around that time, he and his wife had several back to back deaths in the family which introduced prolonged periods of sadness for them.  The patient began to increase his alcohol usage to help cope with these emotions however his usage gradually progressed to drinking a 175 mL bottle of maude over the course of 2-3 days.  He began to notice tolerance to this amount of alcohol and would feel shaky and nauseous during extended periods were alcohol was not used.  He eventually lost his job because he was noted to be smelling of alcohol while at work.  Since then, he spends most of his day at home drinking alcohol.  He has become more isolative from friends and family due to his alcohol usage.  He has felt unmotivated and unable to find new employment  due to his alcohol usage.  As he and his wife were growing more frustrated with his difficulty managing his alcohol consumption, they pursued a chemical health assessment at [facility] earlier this week.  He was then discharged home as he awaited bed availability at a treatment facility for chemical addiction treatment.  He continued to use alcohol at home and began to experience worsening nausea and vomiting prompting his arrival to our emergency room.  Both he and his wife are hoping that he can gain admission to the [facility]ging plus program at [facility] for chemical addiction treatment.  They question whether the patient is experiencing underlying anxiety and sadness due to the adjustments needed for living a life with celiac disease.  Otherwise, he adamantly denied any suicidal or homicidal thoughts.  He is interested in pursuing sobriety and accepting of interventions.    Psychiatric Review of Systems:    -- Depressive episode: Mood is sad mostly secondary to ongoing alcohol usage, unemployment, and physical discomfort which has since improved.  He characterized the severity of his sadness as mild.  No significant anhedonia unrelated to alcohol use reported.  He denied feeling hopeless.  No suicidal ideations reported.  Neurovegetative symptoms seem to be complicated by ongoing alcohol use.  -- Cristine:  denies symptoms  -- Psychosis:  denies symptoms  -- Anxiety: denies symptoms corresponding to DAIANA or panic disorder  -- PTSD: denies symptoms  -- OCD: denies  symptoms  -- Eating disorder: denies symptoms    Psychiatric History:    No prior mental health treatment.  No history of suicide attempts.  No history of self-injurious behavior.  No history of inpatient mental health treatment.    Substance Use History:    Drug of choice is alcohol with pattern of usage corresponding to dependence further reporting progressive use, loss of control over use, drinking to the point of intoxication, loss of employment and  strained relationships secondary to his usage, loss of general functionality due to ongoing use.  He denied any illicit substance usage.  No history of withdrawal seizures.  No history of DTs.  He has attended outpatient treatment in the past and did fairly well however relapse shortly after completion of the program.    Medical History:  Refer to the internal medicine notes which I reviewed.       Current Facility-Administered Medications:      diazepam (VALIUM) tablet 5-20 mg, 5-20 mg, Oral, Q30 Min PRN, Joaquim Spain MD     folic acid (FOLVITE) tablet 1 mg, 1 mg, Oral, Daily, Samir Starks MD, 1 mg at 09/12/18 0810     loperamide (IMODIUM) capsule 2 mg, 2 mg, Oral, 4x Daily PRN, Samir Starks MD, 2 mg at 09/12/18 1146     LORazepam (ATIVAN) tablet 1-4 mg, 1-4 mg, Oral, Q30 Min PRN, Samir Starks MD, 1 mg at 09/12/18 1625     multivitamin, therapeutic with minerals (THERA-VIT-M) tablet 1 tablet, 1 tablet, Oral, Daily, Samir Starks MD, 1 tablet at 09/12/18 0810     ondansetron (ZOFRAN-ODT) ODT tab 4 mg, 4 mg, Oral, Q4H PRN, Samir Starks MD     potassium & sodium phosphates (NEUTRA-PHOS) Packet 2 packet, 2 packet, Oral, TID, Curtis Lawrence MD, 2 packet at 09/12/18 1452     potassium phosphate 15 mmol in D5W 250 mL intermittent infusion, 15 mmol, Intravenous, Daily PRN, Samir Starks MD     potassium phosphate 20 mmol in D5W 250 mL intermittent infusion, 20 mmol, Intravenous, Q6H PRN, Samir Starks MD     potassium phosphate 20 mmol in D5W 500 mL intermittent infusion, 20 mmol, Intravenous, Q6H PRN, Samir Starks MD, 20 mmol at 09/12/18 0311     potassium phosphate 25 mmol in D5W 500 mL intermittent infusion, 25 mmol, Intravenous, Q8H PRN, Samir Starks MD     ranitidine (ZANTAC) tablet 150 mg, 150 mg, Oral, BID, Samir Starks MD, 150 mg at 09/12/18 0810     thiamine tablet 100 mg, 100 mg, Oral, Daily, Samir Starks,  "MD, 100 mg at 09/12/18 0810     Social History:  Refer to the psychosocial assessment completed by the .     Family History:    No family history of bipolar disorder or suicides.    Vital Signs:    B/P: 149/89, T: 97.6, P: 91, R: 16  Estimated body mass index is 20.09 kg/(m^2) as calculated from the following:    Height as of this encounter: 1.778 m (5' 10\").    Weight as of this encounter: 63.5 kg (140 lb).       Mental status examination:  Appearance:  Alert, fair hygiene, no acute distress  Attitude:  Attempts to be cooperative  Eye Contact: Fair  Mood:  \"better\"  Affect: Mood congruent and blunted  Speech:  Normal rates, tone, latency, volume. Not pushed or pressured.  Psychomotor Behavior:  No psychomotor abnormalities noted  Thought Process: Linear and logical; not tangential or circumstantial or disorganized  Associations:  Logical; no loose associations Noted  Thought Content:  No obvious paranoia, delusions, ideas of reference, or grandiosity noted. Denies auditory or visual hallucinations. Denies suicidal Ideations. Denies homicidal ideations.  Insight:  Fair  Judgment:  Fair  Oriented to:  time, person, and place  Attention Span and Concentration:  Intact  Recent and Remote Memory: Intact based on interviewing and details provided  Language: Appropriate based on interviewing  Fund of Knowledge: Appropriate based on interviewing  Muscle Strength and Tone: Normal upon visual inspection  Gait and Station: Normal upon visual inspection            Diagnoses:    Alcohol use disorder, severe  Unspecified depressive disorder  (rule out complicated bereavement, substance-induced mood disorder, or a major depressive disorder-single episode mild)     Recommendations:  1.  Continue MSSA protocol with Ativan to minimize alcohol withdrawal symptoms.  He last received a dose at noon today to address ongoing withdrawal symptoms.  If he is not gaining adequate response to Ativan, consider changing to Valium " for better effectiveness.    2.  The patient would benefit from outpatient monitoring of his mood to determine if his current mood symptoms are related to a complicated bereavement or a mild major depressive disorder or a substance-induced mood disorder secondary to ongoing alcohol usage.  He would benefit from a referral for individual psychotherapy and may meet with our  to help assist in gathering outpatient resources.    3.  I contacted our intake department and attempted to arrange a transfer to our detox unit however the unit is full and has a wait list.  The patient has been added to the wait list however the intake department will need to be contacted tomorrow morning at extension [phone] for an update on whether or not the patient still requires admission to detox.  Continue to monitor MSSA scores as a marker of whether admission to detox will be warranted tomorrow.    4.  The patient is also been referred to our [facility] program who are attempting to obtain copies of his recent chemical health assessment to determine if funding has been approved for treatments at that facility.  Otherwise, he may continue working with his chemical health  on gaining admission to the recommended treatment facilities indicated.    Please reconsult with psychiatry as needed.

## 2018-09-12 NOTE — PLAN OF CARE
Problem: Patient Care Overview  Goal: Plan of Care/Patient Progress Review  Outcome: Improving        VS:   Arrived to unit at 6:30pm.Alert,well orientated.Gait steady.LS clear,satting well at room air.States last drink was yesterday morning.MSSA scored low at 4.   Output:   Denies problems voiding.Passing gas,last BM 9/10.   Activity:   Up ad syeda.Steady gait.No tremors.   Skin: Intact.   Pain:   denies   Neuro/CMS:   Denies any numbness/tingling sensation   Dressing(s):   none   Diet:   Gluten free diet.Denies nausea.   LDA:   PIV line L forearm for IVF running at 125ml/hr.   Equipment:      Plan:   Ate well with no nausea all night.MSSA scored low.Glucose trending down.Phosphorus 1.7,phos replacement almost done and will be rechecked in 2 hours.   Additional Info:

## 2018-09-12 NOTE — CONSULTS
9/12/2018  Since pt has already completed a CD evaluation with [facility] yesterday, Neela (PIETRO) and I agreed pt does not need to complete a new one. She has requested pt's eval be faxed to our intake department so F140 can further review it for LP.   Further steps needed prior to a potential admission into LP:  1) Review Rule 25/CD eval  2) if appropriate, pt will need pre-authorization in place by [facility] prior to admitting.    Lien Momin MA [facility]  [phone]

## 2018-09-12 NOTE — PROGRESS NOTES
Pt feels well  No nausea or emesis  Appetite is good    No chest pain, SOB  Denies tremors  Up ambulating in araujo    Low grade T  BP 130s-140s/90-a00  HR 80    Alert, fully oriented, appears well  Oral mucosa moist   Lungs clear  CV rrr  Abd soft, non-distended, non-tender  No LE edema  No tremors        Results for JAMES LOW (MRN [id number]) as of 9/12/2018 09:59   Ref. Range 9/12/2018 06:45   Sodium Latest Ref Range: 133 - 144 mmol/L 139   Potassium Latest Ref Range: 3.4 - 5.3 mmol/L 3.7   Chloride Latest Ref Range: 94 - 109 mmol/L 102   Carbon Dioxide Latest Ref Range: 20 - 32 mmol/L 26   Urea Nitrogen Latest Ref Range: 7 - 30 mg/dL 6 (L)   Creatinine Latest Ref Range: 0.66 - 1.25 mg/dL 0.86   GFR Estimate Latest Ref Range: >60 mL/min/1.7m2 >90   GFR Estimate If Black Latest Ref Range: >60 mL/min/1.7m2 >90   Calcium Latest Ref Range: 8.5 - 10.1 mg/dL 9.0   Anion Gap Latest Ref Range: 3 - 14 mmol/L 11   Phosphorus Latest Ref Range: 2.5 - 4.5 mg/dL 4.1   Albumin Latest Ref Range: 3.4 - 5.0 g/dL 3.8   Protein Total Latest Ref Range: 6.8 - 8.8 g/dL 6.9   Bilirubin Total Latest Ref Range: 0.2 - 1.3 mg/dL 2.0 (H)   Alkaline Phosphatase Latest Ref Range: 40 - 150 U/L 63   ALT Latest Ref Range: 0 - 70 U/L 114 (H)   AST Latest Ref Range: 0 - 45 U/L 112 (H)         Assessment      ETOH dependency, ongoing use, with nausea with vomiting, metabolic acidosis (likely starvation) on admission.  Stable hemodynamics, resolution of elevated AG, resolution of GI symptoms with IV fluids. Has not required lorazepam since admission. No evidence of significant withdrawal    HTN, possibly related to withdrawal, no history of HTN    Elevated transaminases, sl elevation of Bili, most likely related to ETOH    Celiac disease, on gluten free diet.  This might be an obstacle to inpt CD tx    Plan  Continue supportive tx  Saline lock IV  Monitor for withdrawal  Monitor BP off medications  Psych and CD tx  Dc when safe dc plan can be  arranged  Discussed with wife

## 2018-09-12 NOTE — CONSULTS
Psychiatry consult: initial eval  Patient seen; full note to follow.     Attempted to transfer to our detox unit however they are full with a wait list at the moment. He has been added to the wait list. Please call intake at [phone] tomorrow morning for an update. He remains on the wait list for admission to [facility] as well for residential CD treatment once detox is complete.     We discussed reassessing mood and anxiety symptoms after sobriety has been achieved to determine if medication management would be indicated. Otherwise, he may pursue outpatient psychotherapy to enhance coping skills to address mild mood/anxiety symptoms.

## 2018-09-12 NOTE — PLAN OF CARE
Problem: Patient Care Overview  Goal: Plan of Care/Patient Progress Review  Outcome: No Change    VS:   VSS with exception of elevated BP. LS clear.   Denies chest pain, SOB and dizziness.   Output:   Voiding adequate amounts without difficulty.  Pt reported 3 loose stools this shift. MD notified. No stool sample needed at this time. Immodium ordered and given.    Activity:   Up ad syeda   Skin: Intact   Pain:   Denies   Neuro/CMS:   Intact. A/Ox4. Denies numbness/tingling.  MSSA scores 4 and 12. Ativan given x1.    Dressing(s):   N/A   Diet:   Gluten free diet - tolerating ok with no n/v.    LDA:   PIV SL.    Equipment:   n/a   Plan:   Plan to go to CD treatment when bed available.    Additional Info:   Call light in reach, able to make needs known.  Phosphorus recheck was 4.1 - no more replacement needed.

## 2018-09-12 NOTE — PROGRESS NOTES
Social Work Services Progress Note    Hospital Day: 1  Date of Initial Social Work Evaluation:  Not Completed   Collaborated with:  Pt, pt's wife, Lala, at bedside, [facility] (Lien RUSSELL [phone])    Data:  SW consult re: CD resources. Pt presented to the ED for nausea and vomiting. Pt completed Chemical Health Assessment at [facility] yesterday w/ recommendation for inpatient treatment. [facility] has a several week wait list to get into their program. Pt's wife called various other CD inpatient units ([facility], [facility] and [facility]) and was told 1-4 weeks waiting. Pt's wife reports that [facility] had the shortest waiting list. Discussed w/ [facility] and it would be about a 1 week waiting and would need to obtain the Chemical Health Assessment. Pt signed the TYRONE and writer faxed it to [facility] (p: [phone] f:[phone]). Since assessment was completed yesterday, it is unclear when it will be finalized. Pt's wife mentioned that  had indicated that in one week she would be able to get a copy of it. Pt and wife indicate that it is very important that CD treatment program be able to accommodate a celiac diet. Apparently, they have tried to get into [facility] and facility will not accommodate a celiac diet. Discussed w/ [facility] and facility would be able to accommodate a celiac diet.     Intervention:  CD Resources     Assessment:  SW met w/ pt and his wife. Pt is agreeable to treatment and wife wants him to be admitted directly into treatment from hospital. Writer provided education, in that it is very unlikely that pt will be able to admit directly to inpatient treatment. Pt's wife ok with this as she feels like they are making progress with [facility] now looking at benefits.     Plan:    Anticipated Disposition:  Home w/ outpt f/u with [facility] regarding bed availability     Barriers to d/c plan:  Anticipate no bed available at [facility] and further more need to get Chemical  Health Assessment before they would be able to move forward with accepting.     Follow Up:  No further SW needs identified.     Addendum:   1312: Pt has signed TYRONE for information to be shared with his wife, Radha, including Chemical Dependency Treatment information. TYRONE placed in pt's chart.

## 2018-09-13 PROCEDURE — 12000001 ZZH R&B MED SURG/OB UMMC

## 2018-09-13 PROCEDURE — 25000132 ZZH RX MED GY IP 250 OP 250 PS 637: Performed by: INTERNAL MEDICINE

## 2018-09-13 PROCEDURE — 99232 SBSQ HOSP IP/OBS MODERATE 35: CPT | Performed by: INTERNAL MEDICINE

## 2018-09-13 PROCEDURE — 99207 ZZC CDG-MDM COMPONENT: MEETS MODERATE - UP CODED: CPT | Performed by: INTERNAL MEDICINE

## 2018-09-13 RX ADMIN — FOLIC ACID 1 MG: 1 TABLET ORAL at 08:19

## 2018-09-13 RX ADMIN — MULTIPLE VITAMINS W/ MINERALS TAB 1 TABLET: TAB at 08:19

## 2018-09-13 RX ADMIN — Medication 100 MG: at 08:20

## 2018-09-13 RX ADMIN — LORAZEPAM 1 MG: 1 TABLET ORAL at 09:38

## 2018-09-13 RX ADMIN — RANITIDINE 150 MG: 150 TABLET ORAL at 08:19

## 2018-09-13 RX ADMIN — RANITIDINE 150 MG: 150 TABLET ORAL at 19:44

## 2018-09-13 RX ADMIN — LORAZEPAM 1 MG: 1 TABLET ORAL at 19:44

## 2018-09-13 ASSESSMENT — ACTIVITIES OF DAILY LIVING (ADL)
ADLS_ACUITY_SCORE: 9

## 2018-09-13 NOTE — PLAN OF CARE
Problem: Patient Care Overview  Goal: Plan of Care/Patient Progress Review  Outcome: Improving        VS:   VSS. Denies CP/SOB. O2 sats upper 90's on RA   Output:   Voiding +. Had loose Bm's on day shift, but none this evening. BS+/flatus+   Activity:   Independent     Skin:    Pain:   Denies   Neuro/CMS:   Alert and oriented. MSSA 8 (1mg ativan given) and MSSA 4 (no ativan given). Cms+   Dressing(s):      Diet:   Has celiacs. Given GF menu. Tolerating no n/v   LDA:   L PIV SL   Equipment:      Plan:   Discharge to inpt treatment   Additional Info:

## 2018-09-13 NOTE — PROGRESS NOTES
Pt seen with wife    Overall, he feels well, feels more shaky, last used lorazepam this am  Appetite improved, no BM since yesterday  Psychiatry consult appreciated.      Wife and Pt remain hopeful he can be dced directly to AL, or to 3A for continued detox    VSS  Afebrile  BP intermittently elevated    Alert, fully oriented, pleasant  Lungs clear  CV rrr  Abd soft, non-tender  Sl tremors both hands  Ambulating without difficulty        Assessment      ETOH dependency, ongoing use, with nausea with vomiting, metabolic acidosis (likely starvation) on admission.  Stable hemodynamics, resolution of elevated AG, resolution of GI symptoms with IV fluids. Has since developed symptoms of mild withdrawal, requiring low dose lorazepam     HTN, possibly related to withdrawal, no history of HTN     Elevated transaminases, sl elevation of Bili, most likely related to ETOH     Celiac disease, on gluten free diet.       Plan  Continue MSSA  Monitor vitals,   Dc to AL vs 3A vs home (when detox completed)

## 2018-09-13 NOTE — PROGRESS NOTES
Social Work Services Progress Note    Hospital Day: 3  Collaborated with:  Pt, pt's wife, Linnea Umer Blue, Primary medical team.    Data:  Pt is a 32 year old male, admitted to [facility] 8A for Acidosis, metabolic.    Intervention:  Discharge planning     Assessment:  Writer talked to the pt and his wife about the Rule 25 that was completed. Pt's wife stated that she would call [facility] and have them send the Rule 25 over to [facility]. Writer talked to Rut with [facility] ([phone]) and she stated that she would watch for the Rule 25 to come through and they can go from there. Writer also sent over the signed TYRONE to [facility] so they could release the assessment.     Plan:    Anticipated Disposition:  3A detox bed or [facility]     Barriers to d/c plan:  Placement acceptance.     Follow Up:  SW will continue to follow and assist as needed.    DELIA Wayne  Covering   [phone]  Pager: [phone]

## 2018-09-13 NOTE — PROGRESS NOTES
Writer contacted Lien with [facility] and she still hasn't received the pt's Rule 25. Writer contacted the pt's wife and asked for the name and phone number of the Manuel . Writer called Zaira Ansari at [phone] and left a VM about getting the Rule 25 sent over. Writer waiting on a call back from Manuel. PIETRO will continue to follow and assist as needed.    DELIA Wayne  Covering 8A  [phone]  Pager: [phone]

## 2018-09-13 NOTE — PLAN OF CARE
Problem: Alcohol Withdrawal Acute, Risk/Actual (Adult)  Goal: Signs and Symptoms of Listed Potential Problems Will be Absent, Minimized or Managed (Alcohol Withdrawal Acute, Risk/Actual)  Signs and symptoms of listed potential problems will be absent, minimized or managed by discharge/transition of care (reference Alcohol Withdrawal Acute, Risk/Actual (Adult) CPG).   Outcome: No Change  VS: VSS. Denies CP/SOB. O2 sats upper 90's on RA   Output: Voiding adequate amts in bathroom.    Activity: Independent. Ambulated halls x 2   Skin: Scabbing at base of neck from scratching pimples.    Pain: Denies   Neuro/CMS: Alert and oriented. MSSA 12 (1mg ativan given)    Dressing(s):     Diet: Has celiacs. Orders from GF menu. Tolerating no n/v   LDA: L PIV SL   Equipment:     Plan: Discharge to inpt treatment when bed available .   Additional Info:

## 2018-09-13 NOTE — PLAN OF CARE
Problem: Patient Care Overview  Goal: Plan of Care/Patient Progress Review  Outcome: Improving  A/Ox's 4. Pt denied pain. MSSA of 5.  Denied any nausea, CP, SOB, lightheadedness or dizziness. Voiding without pain or difficulty. Resting in bed at this time with call light in reach. Able to make needs known. Awaiting placement for [facility]. Continue to monitor.

## 2018-09-14 PROCEDURE — 99207 ZZC CDG-MDM COMPONENT: MEETS MODERATE - UP CODED: CPT | Performed by: INTERNAL MEDICINE

## 2018-09-14 PROCEDURE — 99232 SBSQ HOSP IP/OBS MODERATE 35: CPT | Performed by: INTERNAL MEDICINE

## 2018-09-14 PROCEDURE — 25000132 ZZH RX MED GY IP 250 OP 250 PS 637: Performed by: INTERNAL MEDICINE

## 2018-09-14 PROCEDURE — 12000001 ZZH R&B MED SURG/OB UMMC

## 2018-09-14 RX ORDER — LANOLIN ALCOHOL/MO/W.PET/CERES
100 CREAM (GRAM) TOPICAL DAILY
Status: COMPLETED | OUTPATIENT
Start: 2018-09-14 | End: 2018-09-16

## 2018-09-14 RX ORDER — LANOLIN ALCOHOL/MO/W.PET/CERES
100 CREAM (GRAM) TOPICAL DAILY
Qty: 30 TABLET | Refills: 0 | Status: SHIPPED | OUTPATIENT
Start: 2018-09-14

## 2018-09-14 RX ORDER — MULTIPLE VITAMINS W/ MINERALS TAB 9MG-400MCG
1 TAB ORAL DAILY
Qty: 30 EACH | Refills: 0 | Status: SHIPPED | OUTPATIENT
Start: 2018-09-15 | End: 2018-11-27

## 2018-09-14 RX ADMIN — LORAZEPAM 2 MG: 1 TABLET ORAL at 16:11

## 2018-09-14 RX ADMIN — Medication 100 MG: at 08:30

## 2018-09-14 RX ADMIN — RANITIDINE 150 MG: 150 TABLET ORAL at 08:30

## 2018-09-14 RX ADMIN — Medication 100 MG: at 16:08

## 2018-09-14 RX ADMIN — RANITIDINE 150 MG: 150 TABLET ORAL at 20:37

## 2018-09-14 RX ADMIN — MULTIPLE VITAMINS W/ MINERALS TAB 1 TABLET: TAB at 08:30

## 2018-09-14 RX ADMIN — FOLIC ACID 1 MG: 1 TABLET ORAL at 08:36

## 2018-09-14 ASSESSMENT — ACTIVITIES OF DAILY LIVING (ADL)
ADLS_ACUITY_SCORE: 9

## 2018-09-14 NOTE — PLAN OF CARE
Problem: Patient Care Overview  Goal: Plan of Care/Patient Progress Review  Outcome: Improving  Patient A/Ox4. VSS. MSSA score of 6 . Denies CP, SOB, dizziness/LH. LSCTA. +fl/BS. Voiding spontaneously. Tolerating regular diet . Patient has demonstrated ability to call appropriately. Patient is resting with call light within reach. Will continue to monitor. Plan to discharge today?

## 2018-09-14 NOTE — PROGRESS NOTES
Pt feels well, is anxious to enter LP (likely later today)  Feels less anxious  Last use of lorazepam yesterday pm    VSS  BP 130s/90s  Alert, fully oriented  Lungs clear  CV rrr  Abd soft  No tremors    Assessment    ETOH dependency, ongoing use, with nausea with vomiting, metabolic acidosis (likely starvation) on admission.  Stable hemodynamics, resolution of elevated AG, resolution of GI symptoms with IV fluids. He has had mild withdrawal, treated with low dose lorazepam     HTN, possibly related to withdrawal, no history of HTN, mild      Elevated transaminases, sl elevation of Bili, most likely related to ETOH      Celiac disease, on gluten free diet.         Plan  Dc to LP  Pt will need f/u LFTS in several weeks with primary MD  Copy of labs given to Pt, labs reviewed with Pt
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
## 2018-09-14 NOTE — PLAN OF CARE
Problem: Patient Care Overview  Goal: Plan of Care/Patient Progress Review  Pt. A&Ox4. VSS. Afebrile. MSSA 5&6. Lung sounds CTA. Maintaining sats on RA. Bowel sounds active, LBM 9/12. PP+ DP+. CMS and neuro's are intact. Denies numbness and tingling in all extremities. Denies nausea, shortness of breath, and chest pain. Denies pain. Voids spontaneously without difficulty. Tolerating regular diet. Pt up independently. PIV is patent and SL. Bilateral heels are elevated off the bed. Call light is within reach, pt able to make needs known. Will continue to monitor.

## 2018-09-14 NOTE — PLAN OF CARE
Problem: Alcohol Withdrawal Acute, Risk/Actual (Adult)  Goal: Signs and Symptoms of Listed Potential Problems Will be Absent, Minimized or Managed (Alcohol Withdrawal Acute, Risk/Actual)  Signs and symptoms of listed potential problems will be absent, minimized or managed by discharge/transition of care (reference Alcohol Withdrawal Acute, Risk/Actual (Adult) CPG).   Outcome: Improving  [id number]  VS: BP elevated but improving; MD aware.   O2: >90% on RA   Output: Adequate   Last BM: 9/13   Activity: Independent   Up for meals? Yes   Skin: Scabbing base of neck   Pain: Denies   CMS: Intact   Dressing: NA   Diet: Regular; tolerated meals with no NV   LDA: PIV SL   Equipment: NA   Plan: Detox when bed available   Additional Info: MSSA 5 and 6

## 2018-09-14 NOTE — PLAN OF CARE
Problem: Patient Care Overview  Goal: Plan of Care/Patient Progress Review  Outcome: No Change  A/Ox's 4. Pt denied pain. MSSA of 9. Treated with Ativan. Pt ambulated the halls several times and seemed to be optimistic. Denied any nausea, CP, SOB, lightheadedness or dizziness. Voiding without pain or difficulty. Resting in bed at this time with call light in reach. Able to make needs known. Awaiting placement for [facility]. Continue to monitor.

## 2018-09-14 NOTE — PROGRESS NOTES
"Rule 25 Assessment  Background Information   1. Date of Assessment Request  2. Date of Assessment  9/14/2018 3. Date Service Authorized     4.   Rut Momin MA [facility] [place]     5.  Phone Number   [phone] 6. Referent  [facility] 7. Assessment Site  [facility] 8A     8. Client Name   Glenn Simons 9. Date of Birth  1985 Age  32 year old 10. Gender  male  11. PMI/ Insurance No.  [id number]   12. Client's Primary Language:  English 13. Do you require special accommodations, such as an  or assistance with written material? No   14. Current Address: [address]  [place] [address]   15. Client Phone Numbers: [phone]       16. Tell me what has happened to bring you here today.    Pt was seen on Station 8A at [facility] for a CD evaluation because he knows he needs inpatient CD treatment.     17. Have you had other rule 25 assessments?     He completed a CD assessment with Zaira Ansari at [facility] on 9/11/2018.    DIMENSION I - Acute Intoxication /Withdrawal Potential   1. Chemical use most recent 12 months outside a facility and other significant use history (client self-report)              X = Primary Drug Used   Age of First Use Most Recent Pattern of Use and Duration   Need enough information to show pattern (both frequency and amounts) and to show tolerance for each chemical that has a diagnosis   Date of last use and time, if needed   Withdrawal Potential? Requiring special care Method of use  (oral, smoked, snort, IV, etc)   x   Alcohol     \"kid\" Since April 2018: drinking a 1.75L in 2-3 days.    9/11/2018 no oral      Marijuana/  Hashish   N/A           Cocaine/Crack     N/A           Meth/  Amphetamines   N/A           Heroin     N/A           Other Opiates/  Synthetics   N/A           Inhalants     N/A           Benzodiazepines     N/A           Hallucinogens     N/A           Barbiturates/  Sedatives/  Hypnotics N/A           Over-the-Counter Drugs   N/A           " Other     N/A        x   Nicotine     16 2-3 cigarettes per day  9/10/2018 no smoke     2. Do you use greater amounts of alcohol/other drugs to feel intoxicated or achieve the desired effect?  Yes.  Or use the same amount and get less of an effect?  Yes.  Example: increase in tolerance with alcohol.    3A. Have you ever been to detox?     Yes    3B. When was the first time?     2018 @ [facility]    3C. How many times since then?     1    3D. Date of most recent detox:     Current    4.  Withdrawal symptoms: Have you had any of the following withdrawal symptoms?  Past 12 months Recent (past 30 days)   Sweating (Rapid Pulse)  Shaky / Jittery / Tremors  Unable to Sleep  Nausea / Vomiting  Unable to Eat Sweating (Rapid Pulse)  Shaky / Jittery / Tremors  Unable to Sleep  Nausea / Vomiting  Unable to Eat     's Visual Observations and Symptoms: No visible withdrawal symptoms at this time    Based on the above information, is withdrawal likely to require attention as part of treatment participation?  No    Dimension I Ratings   Acute intoxication/Withdrawal potential - The placing authority must use the criteria in Dimension I to determine a client s acute intoxication and withdrawal potential.    RISK DESCRIPTIONS - Severity ratin Client displays full functioning with good ability to tolerate and cope with withdrawal discomfort. No signs or symptoms of intoxication or withdrawal or resolving signs or symptoms.    REASONS SEVERITY WAS ASSIGNED (What about the amount of the person s use and date of most recent use and history of withdrawal problems suggests the potential of withdrawal symptoms requiring professional assistance? )     Pt's last use of alcohol was on 2018. Pt has been hospitalized for alcohol withdrawal since 2018. All withdrawal issues are resolved.         DIMENSION II - Biomedical Complications and Conditions   1. Do you have any current health/medical conditions?(Include  any infectious diseases, allergies, or chronic or acute pain, history of chronic conditions)       Yes.   Illnesses/Medical Conditions you are receiving care for:   Past Medical History:   Diagnosis Date     Celiac disease      2. Do you have a health care provider? When was your most recent appointment? What concerns were identified?     Dr. Ruslan Johnson in [place]    3. If indicated by answers to items 1 or 2: How do you deal with these concerns? Is that working for you? If you are not receiving care for this problem, why not?      Pt is currently hospitalized.    4A. List current medication(s) including over-the-counter or herbal supplements--including pain management:     Current Facility-Administered Medications   Medication     amLODIPine (NORVASC) tablet 2.5 mg     escitalopram (LEXAPRO) tablet 10 mg     folic acid (FOLVITE) tablet 1 mg     hydrOXYzine (ATARAX) tablet 25 mg     loperamide (IMODIUM) capsule 2 mg     LORazepam (ATIVAN) tablet 1-4 mg     multivitamin, therapeutic with minerals (THERA-VIT-M) tablet 1 tablet     ondansetron (ZOFRAN-ODT) ODT tab 4 mg     potassium phosphate 15 mmol in D5W 250 mL intermittent infusion     potassium phosphate 20 mmol in D5W 250 mL intermittent infusion     potassium phosphate 20 mmol in D5W 500 mL intermittent infusion     potassium phosphate 25 mmol in D5W 500 mL intermittent infusion     ranitidine (ZANTAC) tablet 150 mg     4B. Do you follow current medical recommendations/take medications as prescribed?     Yes    4C. When did you last take your medication?     9/14/2018    5. Has a health care provider/healer ever recommended that you reduce or quit alcohol/drug use?     Yes    6. Are you pregnant?     Male    7. Have you had any injuries, assaults/violence towards you, accidents, health related issues, overdose(s) or hospitalizations related to your use of alcohol or other drugs:     Yes, explain: current hospitalization.     8. Do you have any specific  "physical needs/accommodations? Yes, Gluten free diet    Dimension II Ratings   Biomedical Conditions and Complications - The placing authority must use the criteria in Dimension II to determine a client s biomedical conditions and complications.   RISK DESCRIPTIONS - Severity ratin Client tolerates and hawa with physical discomfort and is able to get the services that the client needs.    REASONS SEVERITY WAS ASSIGNED (What physical/medical problems does this person have that would inhibit his or her ability to participate in treatment? What issues does he or she have that require assistance to address?)    Pt has Celiac's Disease and requires a gluten free diet. He reports all other health issues are resolved.          DIMENSION III - Emotional, Behavioral, Cognitive Conditions and Complications   1. (Optional) Tell me what it was like growing up in your family. (substance use, mental health, discipline, abuse, support)     \"I am the middle of 5 kids. Born with Celiac's Disease. In and out of hospitals and clinics for it.\"    He reports everyone in his family drinks heavily.    2. When was the last time that you had significant problems...  A. with feeling very trapped, lonely, sad, blue, depressed or hopeless  about the future? Past Month    B. with sleep trouble, such as bad dreams, sleeping restlessly, or falling  asleep during the day? Past Month    C. with feeling very anxious, nervous, tense, scared, panicked, or like  something bad was going to happen? Past Month    D. with becoming very distressed and upset when something reminded  you of the past? Past Month    E. with thinking about ending your life or committing suicide? Past Month    3. When was the last time that you did the following things two or more times?  A. Lied or conned to get things you wanted or to avoid having to do  something? Past Month    B. Had a hard time paying attention at school, work, or home? 2 - 12 months ago    C. Had a " hard time listening to instructions at school, work, or home? Past Month    D. Were a bully or threatened other people? Never    E. Started physical fights with other people? 1+ years ago    Note: These questions are from the Global Appraisal of Individual Needs--Short Screener. Any item marked  past month  or  2 to 12 months ago  will be scored with a severity rating of at least 2.     For each item that has occurred in the past month or past year ask follow up questions to determine how often the person has felt this way or has the behavior occurred? How recently? How has it affected their daily living? And, whether they were using or in withdrawal at the time?    2A) not having a job and missing family trips due to work.  2B) sleep schedule messed up from working overnights for many years.  2C) about going to treatment and never having been to IP before.  2D) Loss of his job and wife's grandma passing away in April 2018.  2E) Intentionally stopped eating in order to kill himself.  3A) lying to his wife about looking for work.  3B) with his wife.  3C) with his wife.  3E) in middle school.    4A. If the person has answered item 2E with  in the past year  or  the past month , ask about frequency and history of suicide in the family or someone close and whether they were under the influence.     The 5 days leading up to his ED admission on 9/11/2018, pt intentionally stopped eating as a suicide attempt.     Any history of suicide in your family? Or someone close to you?     No    4B. If the person answered item 2E  in the past month  ask about  intent, plan, means and access and any other follow-up information  to determine imminent risk. Document any actions taken to intervene  on any identified imminent risk.      Pt was reassessed by psychiatry on 9/15/2018.    5A. Have you ever been diagnosed with a mental health problem?     Yes, If yes explain: As of 9/15/2018 per Dr. Sams: Social anxiety disorder, depression  unspecified.    5B. Are you receiving care for any mental health issues? If yes, what is the focus of that care or treatment?  Are you satisfied with the service? Most recent appointment?  How has it been helpful?     NA     6. Have you been prescribed medications for emotional/psychological problems?     As of 9/15/2018, he was prescribed Lexapro, 10mg    7. Does your MH provider know about your use?     NA    8A. Have you ever been verbally, emotionally, physically or sexually abused?      No     Follow up questions to learn current risk, continuing emotional impact.      NA    8B. Have you received counseling for abuse?      N/A    9. Have you ever experienced or been part of a group that experienced community violence, historical trauma, rape or assault?     No    10A. Arjay:    No    11. Do you have problems with any of the following things in your daily life?    Dizziness    Note: If the person has any of the above problems, follow up with items 12, 13, and 14. If none of the issues in item 11 are a problem for the person, skip to item 15.    Related to his alcohol use.    12. Have you been diagnosed with traumatic brain injury or Alzheimer s?  No    13. If the answer to #12 is no, ask the following questions:    Have you ever hit your head or been hit on the head? No    Were you ever seen in the Emergency Room, hospital or by a doctor because of an injury to your head? No    Have you had any significant illness that affected your brain (brain tumor, meningitis, West Nile Virus, stroke or seizure, heart attack, near drowning or near suffocation)? No    14. If the answer to #12 is yes, ask if any of the problems identified in #11 occurred since the head injury or loss of oxygen. No    15A. Highest grade of school completed:     Some college, but no degree    15B. Do you have a learning disability? Yes, dyslexia     15C. Did you ever have tutoring in Math or English? No    15D. Have you ever been diagnosed  with Fetal Alcohol Effects or Fetal Alcohol Syndrome? No    16. If yes to item 15 B, C, or D: How has this affected your use or been affected by your use?     NA    Dimension III Ratings   Emotional/Behavioral/Cognitive - The placing authority must use the criteria in Dimension III to determine a client s emotional, behavioral, and cognitive conditions and complications.   RISK DESCRIPTIONS - Severity ratin Client has difficulty with impulse control and lacks coping skills. Client has thoughts of suicide or harm to others without means; however, the thoughts may interfere with participation in some treatment activities. Client has difficulty functioning in significant life areas. Client has moderate symptoms of emotional, behavioral, or cognitive problems. Client is able to participate in most treatment activities.    REASONS SEVERITY WAS ASSIGNED - What current issues might with thinking, feelings or behavior pose barriers to participation in a treatment program? What coping skills or other assets does the person have to offset those issues? Are these problems that can be initially accommodated by a treatment provider? If not, what specialized skills or attributes must a provider have?    Pt denies a mental health dx or being on any medications. He reports symptoms of anxiety and depression in the past month. Pt admited to intentionally not eating for 5 days leading up to his ED admission on 2018 as a suicide attempt. Pt was reassessed by [facility] psychiatrist, Dr. Redd Sams, on 9/15/2018, and was prescribed Lexapro and given a  dx of social anxiety disorder, depression unspecified.         DIMENSION IV - Readiness for Change   1. You ve told me what brought you here today. (first section) What do you think the problem really is?     His drinking.    2. Tell me how things are going. Ask enough questions to determine whether the person has use related problems or assets that can be built upon in the  "following areas: Family/friends/relationships; Legal; Financial; Emotional; Educational; Recreational/ leisure; Vocational/employment; Living arrangements (DSM)      Pt is living with his wife of 11 years. They do not have any children. He is unemployed after being fired from his job in April 2018 for smelling of alcohol. His wife is concerned about his drinking. Pt denies having any legal issues.    3. What activities have you engaged in when using alcohol/other drugs that could be hazardous to you or others (i.e. driving a car/motorcycle/boat, operating machinery, unsafe sex, sharing needles for drugs or tattoos, etc     Driving a car and driving under the influence at work. He stated he would show up to work smelling of alcohol.    4. How much time do you spend getting, using or getting over using alcohol or drugs? (DSM)     He'd start drinking, \"whenever I was awake. Whenever I woke up from my next nap. Only when I was out and about I wasn't drinking.\"    5. Reasons for drinking/drug use (Use the space below to record answers. It may not be necessary to ask each item.)  Like the feeling Yes   Trying to forget problems Yes   To cope with stress Yes   To relieve physical pain Yes   To cope with anxiety Yes   To cope with depression Yes   To relax or unwind Yes   Makes it easier to talk with people No   Partner encourages use No   Most friends drink or use No   To cope with family problems Yes   Afraid of withdrawal symptoms/to feel better Yes   Other (specify)  N/A     A. What concerns other people about your alcohol or drug use/Has anyone told you that you use too much? What did they say? (DSM)     His wife is very concerned. She has worked very hard in the past week to get him into [facility].    B. What did you think about that/ do you think you have a problem with alcohol or drug use?     He first thought he had a problem with alcohol when he first went to rehab in 2016.    6. What changes are you willing to " "make? What substance are you willing to stop using? How are you going to do that? Have you tried that before? What interfered with your success with that goal?      Pt is willing to attend inpatient CD treatment programming. He wants to stop drinking. He has quit before while he was in outpatient treatment, but relapsed at a family wedding a week after completing tx.     7. What would be helpful to you in making this change?     Attending inpatient CD treatment programming.    Dimension IV Ratings   Readiness for Change - The placing authority must use the criteria in Dimension IV to determine a client s readiness for change.   RISK DESCRIPTIONS - Severity ratin Client is motivated with active reinforcement, to explore treatment and strategies for change, but ambivalent about illness or need for change.    REASONS SEVERITY WAS ASSIGNED - (What information did the person provide that supports your assessment of his or her readiness to change? How aware is the person of problems caused by continued use? How willing is she or he to make changes? What does the person feel would be helpful? What has the person been able to do without help?)      Pt is motivated to attend inpatient CD treatment programming. He knew he has had a problem with alcohol since , when he first entered CD treatment programming. Pt appears to be in the \"preparation\" stage within the Stages of Change Model.         DIMENSION V - Relapse, Continued Use, and Continued Problem Potential   1. In what ways have you tried to control, cut-down or quit your use? If you have had periods of sobriety, how did you accomplish that? What was helpful? What happened to prevent you from continuing your sobriety? (DSM)     Control:  \"I really haven't\"  Sober time: 3 months while in tx.  Why did you relapse? He relapsed a week after completing tx at a family wedding.    2. Have you experienced cravings? If yes, ask follow up questions to determine if the " "person recognizes triggers and if the person has had any success in dealing with them.     4    3. Have you been treated for alcohol/other drug abuse/dependence?     Yes.  3B. Number of times(lifetime) (over what period) 1.  3C. Number of times completed treatment (lifetime) 1.  3D. During the past three years have you participated in outpatient and/or residential?  Yes.  3E. When and where? [facility] for 3 months and he completed on 2016.   3F. What was helpful? What was not? \"The socialization, getting out and socializing with people.\" not helpful: \"They were pretty relaxed there.\"    4. Support group participation: Have you/do you attend support group meetings to reduce/stop your alcohol/drug use? How recently? What was your experience? Are you willing to restart? If the person has not participated, is he or she willing?     The last meeting he attended was in April or May 2018. He went to AA meetings for a little while after he was at [facility].    5. What would assist you in staying sober/straight?     Attending inpatient CD tx.    Dimension V Ratings   Relapse/Continued Use/Continued problem potential - The placing authority must use the criteria in Dimension V to determine a client s relapse, continued use, and continued problem potential.   RISK DESCRIPTIONS - Severity ratin No awareness of the negative impact of mental health problems or substance abuse. No coping skills to arrest mental health or addiction illnesses, or prevent relapse.    REASONS SEVERITY WAS ASSIGNED - (What information did the person provide that indicates his or her understanding of relapse issues? What about the person s experience indicates how prone he or she is to relapse? What coping skills does the person have that decrease relapse potential?)      Pt has attended 1 CD treatment program in 2016 and relapsed a week after discharging at a family wedding. Pt lacks sober coping skills. He does not have any " sober supports outside of his wife. He reports all of his family drinks. Pt lacks knowledge of his relapse triggers and warning signs. Pt is at a high risk of continued use.         DIMENSION VI - Recovery Environment   1. Are you employed/attending school? Tell me about that.     Pt was fired from his job in Aprill 2018 for smelling like alcohol. He was at this job for 2.5 years. He was at his previous job for 11 years.    2A. Describe a typical day; evening for you. Work, school, social, leisure, volunteer, spiritual practices. Include time spent obtaining, using, recovering from drugs or alcohol. (DSM)     He watches tv, drinks, and naps all day.    2B. How often do you spend more time than you planned using or use more than you planned? (DSM)     Every day    3. How important is using to your social connections? Do many of your family or friends use?     All of his family drinks heavy.     4A. Are you currently in a significant relationship?     Yes.  4B. How long? 11 years    4C. Sexual Orientation:     Heterosexual    5A. Who do you live with?      His wife    5B. Tell me about their alcohol/drug use and mental health issues.     NA    5C. Are you concerned for your safety there? No    5D. Are you concerned about the safety of anyone else who lives with you? No    6A. Do you have children who live with you?     No    6B. Do you have children who do not live with you?     No    7A. Who supports you in making changes in your alcohol or drug use? What are they willing to do to support you? Who is upset or angry about you making changes in your alcohol or drug use? How big a problem is this for you?      His wife.    7B. This table is provided to record information about the person s relationships and available support It is not necessary to ask each item; only to get a comprehensive picture of their support system.  How often can you count on the following people when you need someone?   Partner / Spouse Always  supportive   Parent(s)/Aunt(s)/Uncle(s)/Grandparents Usually supportive   Sibling(s)/Cousin(s) Usually supportive   Child(kyle) N/A   Other relative(s) N/A   Friend(s)/neighbor(s) N/A   Child(kyle) s father(s)/mother(s) N/A   Support group member(s) N/A   Community of [place] members N/A   /counselor/therapist/healer N/A   Other (specify) N/A     8A. What is your current living situation?     In a student apartment at [employer] where his wife is attending school.    8B. What is your long term plan for where you will be living?     No changes.    8C. Tell me about your living environment/neighborhood? Ask enough follow up questions to determine safety, criminal activity, availability of alcohol and drugs, supportive or antagonistic to the person making changes.      No concerns    9. Criminal justice history: Gather current/recent history and any significant history related to substance use--Arrests? Convictions? Circumstances? Alcohol or drug involvement? Sentences? Still on probation or parole? Expectations of the court? Current court order? Any sex offenses - lifetime? What level? (DSM)    DWI 2007    10. What obstacles exist to participating in treatment? (Time off work, childcare, funding, transportation, pending residential time, living situation)     None    Dimension VI Ratings   Recovery environment - The placing authority must use the criteria in Dimension VI to determine a client s recovery environment.   RISK DESCRIPTIONS - Severity rating: 3 Client is not engaged in structured, meaningful activity and the client s peers, family, significant other, and living environment are unsupportive, or there is significant criminal justice system involvement.    REASONS SEVERITY WAS ASSIGNED - (What support does the person have for making changes? What structure/stability does the person have in his or her daily life that will increase the likelihood that changes can be sustained? What problems exist in the  person s environment that will jeopardize getting/staying clean and sober?)     Pt is living with his wife of 11 years. They do not have any children. He is unemployed after being fired from his job in April 2018 for smelling of alcohol. His wife is concerned about his drinking. Pt denies having any legal issues.         Client Choice/Exceptions   Would you like services specific to language, age, gender, culture, Amish preference, race, ethnicity, sexual orientation or disability?  No    What particular treatment choices and options would you like to have? inpatient    Do you have a preference for a particular treatment program? [facility]    Criteria for Diagnosis     Criteria for Diagnosis  DSM-5 Criteria for Substance Use Disorder  Instructions: Determine whether the client currently meets the criteria for Substance Use Disorder using the diagnostic criteria in the DSM-V pp.481-589. Current means during the most recent 12 months outside a facility that controls access to substances    Category of Substance Severity (ICD-10 Code / DSM 5 Code)     Alcohol Use Disorder Severe  (10.20) (303.90)   Cannabis Use Disorder NA   Hallucinogen Use Disorder NA   Inhalant Use Disorder NA   Opioid Use Disorder NA   Sedative, Hypnotic, or Anxiolytic Use Disorder NA   Stimulant Related Disorder NA   Tobacco Use Disorder Moderate   (F17.200) (305.1)   Other (or unknown) Substance Use Disorder NA       Collateral Contact Summary   Number of contacts made: 2    Contact with referring person:  NA.    If court related records were reviewed, summarize here: NA    Information from collateral contacts supported/largely agreed with information from the client and associated risk ratings.      Rule 25 Assessment Summary and Plan   's Recommendation    1)  Complete a residential based or similar treatment program, such as [facility].   2)  Abstain from all mood-altering chemicals unless prescribed by a  licensed provider.   3)  Attend, at minimum, 2 weekly support group meetings, such as 12 step based (AA/NA), SMART Recovery, Health Realizations, and/or Refuge Recovery meetings.     4)  Actively work with a male mentor/sponsor on a weekly basis.   5)  Follow all the recommendations of your treatment/medical providers.      Collateral Contacts     Name:    [facility]   Relationship:    EMR   Phone Number:    NA Releases:    NA     The patient's medical record at [facility] was reviewed and the information contained in the medical record supported the patient's account of his chemical use history and chemical use consequences.    Collateral Contacts     Name:    Zaira Ansari Relationship:    CD Evaluator at [facility]    Phone Number:    [phone]   Releases:    No     Zaira faxed her Comprehensive Assessment to this writer on 9/13/2018. The assessment was reviewed and supported pt's chemical use patterns and consequences.   ollateral Contacts      A problematic pattern of alcohol/drug use leading to clinically significant impairment or distress, as manifested by at least two of the following, occurring within a 12-month period:    Alcohol/drug is often taken in larger amounts or over a longer period than was intended.  There is a persistent desire or unsuccessful efforts to cut down or control alcohol/drug use  A great deal of time is spent in activities necessary to obtain alcohol, use alcohol, or recover from its effects.  Craving, or a strong desire or urge to use alcohol/drug  Recurrent alcohol/drug use resulting in a failure to fulfill major role obligations at work, school or home.  Continued alcohol use despite having persistent or recurrent social or interpersonal problems caused or exacerbated by the effects of alcohol/drug.  Important social, occupational, or recreational activities are given up or reduced because of alcohol/drug use.  Recurrent alcohol/drug use in situations in which it is  physically hazardous.  Alcohol/drug use is continued despite knowledge of having a persistent or recurrent physical or psychological problem that is likely to have been caused or exacerbated by alcohol.  Tolerance, as defined by either of the following: A need for markedly increased amounts of alcohol/drug to achieve intoxication or desired effect.  Withdrawal, as manifested by either of the following: The characteristic withdrawal syndrome for alcohol/drug (refer to Criteria A and B of the criteria set for alcohol/drug withdrawal).      Specify if: In early remission:  After full criteria for alcohol/drug use disorder were previously met, none of the criteria for alcohol/drug use disorder have been met for at least 3 months but for less than 12 months (with the exception that Criterion A4,  Craving or a strong desire or urge to use alcohol/drug  may be met).     In sustained remission:   After full criteria for alcohol use disorder were previously met, non of the criteria for alcohol/drug use disorder have been met at any time during a period of 12 months or longer (with the exception that Criterion A4,  Craving or strong desire or urge to use alcohol/drug  may be met).   Specify if:   This additional specifier is used if the individual is in an environment where access to alcohol is restricted.    Mild: Presence of 2-3 symptoms    Moderate: Presence of 4-5 symptoms    Severe: Presence of 6 or more symptoms

## 2018-09-15 PROCEDURE — 99232 SBSQ HOSP IP/OBS MODERATE 35: CPT | Performed by: PSYCHIATRY & NEUROLOGY

## 2018-09-15 PROCEDURE — 99207 ZZC CDG-MDM COMPONENT: MEETS MODERATE - UP CODED: CPT | Performed by: INTERNAL MEDICINE

## 2018-09-15 PROCEDURE — 25000132 ZZH RX MED GY IP 250 OP 250 PS 637: Performed by: PSYCHIATRY & NEUROLOGY

## 2018-09-15 PROCEDURE — 12000001 ZZH R&B MED SURG/OB UMMC

## 2018-09-15 PROCEDURE — 25000132 ZZH RX MED GY IP 250 OP 250 PS 637: Performed by: INTERNAL MEDICINE

## 2018-09-15 PROCEDURE — 99232 SBSQ HOSP IP/OBS MODERATE 35: CPT | Performed by: INTERNAL MEDICINE

## 2018-09-15 RX ORDER — ESCITALOPRAM OXALATE 10 MG/1
10 TABLET ORAL DAILY
Status: DISCONTINUED | OUTPATIENT
Start: 2018-09-15 | End: 2018-09-18 | Stop reason: HOSPADM

## 2018-09-15 RX ADMIN — Medication 100 MG: at 08:42

## 2018-09-15 RX ADMIN — RANITIDINE 150 MG: 150 TABLET ORAL at 08:42

## 2018-09-15 RX ADMIN — RANITIDINE 150 MG: 150 TABLET ORAL at 19:32

## 2018-09-15 RX ADMIN — ESCITALOPRAM OXALATE 10 MG: 10 TABLET ORAL at 14:16

## 2018-09-15 RX ADMIN — FOLIC ACID 1 MG: 1 TABLET ORAL at 08:42

## 2018-09-15 RX ADMIN — MULTIPLE VITAMINS W/ MINERALS TAB 1 TABLET: TAB at 08:42

## 2018-09-15 ASSESSMENT — ACTIVITIES OF DAILY LIVING (ADL)
ADLS_ACUITY_SCORE: 9

## 2018-09-15 NOTE — PLAN OF CARE
Problem: Patient Care Overview  Goal: Plan of Care/Patient Progress Review  Outcome: Improving    VS: VS'S   O2: 95% at RA   Output: adequate   Last BM: 9/14   Activity: indep   Skin: intact   Pain: No pain   CMS: intact   Dressing: None   Diet: Reg diet   LDA: None   Equipment: None   Plan: Continue to [facility]   Additional Info:

## 2018-09-15 NOTE — PLAN OF CARE
Problem: Alcohol Withdrawal Acute, Risk/Actual (Adult)  Goal: Signs and Symptoms of Listed Potential Problems Will be Absent, Minimized or Managed (Alcohol Withdrawal Acute, Risk/Actual)  Signs and symptoms of listed potential problems will be absent, minimized or managed by discharge/transition of care (reference Alcohol Withdrawal Acute, Risk/Actual (Adult) CPG).   Outcome: No Change  Pt A&Ox4. VSS. MSSA score 4, repeat at 0800. LS clear, on RA. BS active, LBM 9/13/2018. Voiding well. Denies pain. CMS intact. PIV patent in L hand. Pt up independently. Plan to discharge to [facility] on Monday. Continue to monitor.

## 2018-09-15 NOTE — PROGRESS NOTES
VS: Stable   O2:    Output: Up to bathroom/not measured.   Last BM: 9/14/2018 patients states.   Activity: Up ad syeda   Skin: WNL   Pain: Denies   CMS: intact   Dressing: NA   Diet: Regular   LDA: NA   Equipment: NA   Plan: Patient was really hoping to go to [facility] on Monday. Wife at bedside and supportive. Its his birthday and they left unit to go and buy coffee.    Additional Info:

## 2018-09-15 NOTE — CONSULTS
"      Psychiatry Consultation; Follow up              Reason for Consult, requesting source:    Follow up regarding his potential for self harm. Was seen by Dr Hdez from our service on the 12th    Requesting source: Samir Starks                   Interim history:    He has presented to the hospital for treatment of alcohol withdrawal.  At the time of presentation he said that \"I was trying to drink myself to death\".   The staff from [facility] wanted him seen by psychiatry again due to the concern about him still being suicidal.  However, he is now feeling much better and hopeful that he can attain sobriety with the appropriate treatment.  During a stressful period while he was in college he was trying to eat himself to death by eating foods that were not in accordance with his celiac diet.  However no prior history of genuine suicide attempts.  Related to the alcohol use he has been quite depressed lately but now feeling much better.  He does have quite a bit of anxiety typically associated with alcohol withdrawal.  As it turns out early on in his drinking (age early 20s) he was medicating some social phobia and generalized anxiety symptoms.  At this point he has absolutely no thoughts of harming himself, feels safe in the hospital and is looking forward to starting CD treatment.           Medications:     Current Facility-Administered Medications   Medication     escitalopram (LEXAPRO) tablet 10 mg     folic acid (FOLVITE) tablet 1 mg     loperamide (IMODIUM) capsule 2 mg     LORazepam (ATIVAN) tablet 1-4 mg     multivitamin, therapeutic with minerals (THERA-VIT-M) tablet 1 tablet     ondansetron (ZOFRAN-ODT) ODT tab 4 mg     potassium phosphate 15 mmol in D5W 250 mL intermittent infusion     potassium phosphate 20 mmol in D5W 250 mL intermittent infusion     potassium phosphate 20 mmol in D5W 500 mL intermittent infusion     potassium phosphate 25 mmol in D5W 500 mL intermittent infusion     ranitidine " "(ZANTAC) tablet 150 mg     thiamine tablet 100 mg     (Lexapro just ordered)          MSE:     Lying quietly in the hospital bed, is well groomed, pleasant, cooperative. Speech fluent. There is no rigidity of the extremities, but he does have hand tremor.  Associations tight.  Mood is \"fair\"  Affect slightly restricted, anxious.  Thought process logical, linear.  Thought content negative for suicidal thoughts or delusions.  Oriented x3.  Recent and remote memory, attention span and concentration are fair.  Fund of knowledge, use of language appropriate.  Insight and judgment fair to poor.     Vital signs:  Temp: 96.8  F (36  C) Temp src: Oral BP: 113/82 Pulse: 90   Resp: 16 SpO2: 98 % O2 Device: None (Room air)   Height: 177.8 cm (5' 10\") Weight: 63.5 kg (140 lb)  Estimated body mass index is 20.09 kg/(m^2) as calculated from the following:    Height as of this encounter: 1.778 m (5' 10\").    Weight as of this encounter: 63.5 kg (140 lb).           DSM-5 Diagnosis:   Social anxiety disorder, depression unspecified, alcohol use disorder, severe           Assessment:   Early on in his drinking he was medicating some social anxiety symptoms so I think it would be beneficial to treat an underlying anxiety; this may aid in his efforts towards sobriety.  Associated with his chronic heavy alcohol use he was feeling quite despondent and hopeless stating that he was trying to drink himself to death.  However he is now looking forward to becoming sober and work on his relationship with his wife etc.  I do not have any concerns about him being suicidal.          Summary of Recommendations:   I have started Lexapro 10 mg per day   Consider use of naltrexone or gabapentin; will defer to provider in [facility]   page me at [phone] as needed     \"This dictation was performed with voice recognition software and may contain errors,  omissions and inadvertent word substitution.\"          "

## 2018-09-15 NOTE — PROGRESS NOTES
Pt feels well overall, mildly anxious  Is looking forward to dc to LP in 2 days  LP has recommended psych reassessment, as he commented to examiner yesterday, that he had felt suicidal prior to admission to hospital, in setting of ETOH use    He denies abd pain, nausea    VSS  Alert, pleasant, mild anxious  Sl tremors of hands  Lungs clear  CV rrr  Abd soft      Assessment    ETOH dependency, ongoing use, with nausea with vomiting, metabolic acidosis (likely starvation) on admission. Pt is doing well, without signs of significant withdrawal    Depression, evaluated shortly after admission by psych--no tx except CD tx recommended.      Elevated transaminases, sl elevation of Bili, most likely related to ETOH      Celiac disease, on gluten free diet.        Plan  Psych re-eval per LP request  Repeat LFTs  Anticipate dc to LP in 2 days

## 2018-09-16 LAB
ALBUMIN SERPL-MCNC: 3.8 G/DL (ref 3.4–5)
ALP SERPL-CCNC: 78 U/L (ref 40–150)
ALT SERPL W P-5'-P-CCNC: 246 U/L (ref 0–70)
ANION GAP SERPL CALCULATED.3IONS-SCNC: 8 MMOL/L (ref 3–14)
AST SERPL W P-5'-P-CCNC: 122 U/L (ref 0–45)
BILIRUB SERPL-MCNC: 0.6 MG/DL (ref 0.2–1.3)
BUN SERPL-MCNC: 8 MG/DL (ref 7–30)
CALCIUM SERPL-MCNC: 9.2 MG/DL (ref 8.5–10.1)
CHLORIDE SERPL-SCNC: 105 MMOL/L (ref 94–109)
CO2 SERPL-SCNC: 28 MMOL/L (ref 20–32)
CREAT SERPL-MCNC: 1 MG/DL (ref 0.66–1.25)
GFR SERPL CREATININE-BSD FRML MDRD: 86 ML/MIN/1.7M2
GLUCOSE SERPL-MCNC: 85 MG/DL (ref 70–99)
POTASSIUM SERPL-SCNC: 3.8 MMOL/L (ref 3.4–5.3)
PROT SERPL-MCNC: 7.2 G/DL (ref 6.8–8.8)
SODIUM SERPL-SCNC: 141 MMOL/L (ref 133–144)

## 2018-09-16 PROCEDURE — 25000132 ZZH RX MED GY IP 250 OP 250 PS 637: Performed by: PSYCHIATRY & NEUROLOGY

## 2018-09-16 PROCEDURE — 36415 COLL VENOUS BLD VENIPUNCTURE: CPT | Performed by: INTERNAL MEDICINE

## 2018-09-16 PROCEDURE — 25000132 ZZH RX MED GY IP 250 OP 250 PS 637: Performed by: INTERNAL MEDICINE

## 2018-09-16 PROCEDURE — 12000001 ZZH R&B MED SURG/OB UMMC

## 2018-09-16 PROCEDURE — 99207 ZZC CDG-MDM COMPONENT: MEETS MODERATE - UP CODED: CPT | Performed by: INTERNAL MEDICINE

## 2018-09-16 PROCEDURE — 80053 COMPREHEN METABOLIC PANEL: CPT | Performed by: INTERNAL MEDICINE

## 2018-09-16 PROCEDURE — 99232 SBSQ HOSP IP/OBS MODERATE 35: CPT | Performed by: INTERNAL MEDICINE

## 2018-09-16 RX ORDER — ESCITALOPRAM OXALATE 10 MG/1
10 TABLET ORAL DAILY
Qty: 30 TABLET | Refills: 1 | Status: SHIPPED | OUTPATIENT
Start: 2018-09-17 | End: 2018-11-27

## 2018-09-16 RX ADMIN — RANITIDINE 150 MG: 150 TABLET ORAL at 20:15

## 2018-09-16 RX ADMIN — RANITIDINE 150 MG: 150 TABLET ORAL at 08:09

## 2018-09-16 RX ADMIN — ESCITALOPRAM OXALATE 10 MG: 10 TABLET ORAL at 08:09

## 2018-09-16 RX ADMIN — Medication 100 MG: at 08:08

## 2018-09-16 RX ADMIN — MULTIPLE VITAMINS W/ MINERALS TAB 1 TABLET: TAB at 08:09

## 2018-09-16 RX ADMIN — FOLIC ACID 1 MG: 1 TABLET ORAL at 08:09

## 2018-09-16 ASSESSMENT — ACTIVITIES OF DAILY LIVING (ADL)
ADLS_ACUITY_SCORE: 9

## 2018-09-16 NOTE — PROGRESS NOTES
Pt states he feels fine  Had mild nausea last night, no recurrence  Is somewhat anxious, anticipating dc to LP tomorrow    Afebrile  BP 110s-150s/80s-110s  Most recent /100    Alert, mildly anxious, in NAD  Up walking on unit without difficulty  Lungs clear  Cv rrr  Abd soft, non-tender  Sl tremor both hands    Results for JAMES LOW (MRN [id number]) as of 9/16/2018 12:36   Ref. Range 9/16/2018 05:36   Sodium Latest Ref Range: 133 - 144 mmol/L 141   Potassium Latest Ref Range: 3.4 - 5.3 mmol/L 3.8   Chloride Latest Ref Range: 94 - 109 mmol/L 105   Carbon Dioxide Latest Ref Range: 20 - 32 mmol/L 28   Urea Nitrogen Latest Ref Range: 7 - 30 mg/dL 8   Creatinine Latest Ref Range: 0.66 - 1.25 mg/dL 1.00   GFR Estimate Latest Ref Range: >60 mL/min/1.7m2 86   GFR Estimate If Black Latest Ref Range: >60 mL/min/1.7m2 >90   Calcium Latest Ref Range: 8.5 - 10.1 mg/dL 9.2   Anion Gap Latest Ref Range: 3 - 14 mmol/L 8   Albumin Latest Ref Range: 3.4 - 5.0 g/dL 3.8   Protein Total Latest Ref Range: 6.8 - 8.8 g/dL 7.2   Bilirubin Total Latest Ref Range: 0.2 - 1.3 mg/dL 0.6   Alkaline Phosphatase Latest Ref Range: 40 - 150 U/L 78   ALT Latest Ref Range: 0 - 70 U/L 246 (H)   AST Latest Ref Range: 0 - 45 U/L 122 (H)   Glucose Latest Ref Range: 70 - 99 mg/dL 85       Assessment    ETOH dependency, ongoing use, with nausea with vomiting, metabolic acidosis (likely starvation) on admission. Pt is doing well, without signs of significant withdrawal     Depression, Pt was seen again by psychiatry who has started lexapro    Mild nausea yesterday, likely related to lexapro       Elevated transaminases, trending up since admission, most likely secondary to ETOH, though relative elevation of ALT is less common.  AST. Essentially unchanged.  Bili now nl    HTN, without known history of HTN, ? Anxiety, ? Withdrawal    Plan  Dc to LP tomorrow if bed is available  Labs reviewed with Pt, he was instructed to f/u with primary MD within  one month to review LFTs      Celiac disease, on gluten free diet.

## 2018-09-16 NOTE — PLAN OF CARE
"Problem: Alcohol Withdrawal Acute, Risk/Actual (Adult)  Goal: Signs and Symptoms of Listed Potential Problems Will be Absent, Minimized or Managed (Alcohol Withdrawal Acute, Risk/Actual)  Signs and symptoms of listed potential problems will be absent, minimized or managed by discharge/transition of care (reference Alcohol Withdrawal Acute, Risk/Actual (Adult) CPG).   Outcome: Improving    VS: BP (!) 152/99 (BP Location: Right arm)  Pulse 68  Temp 98.5  F (36.9  C) (Oral)  Resp 16  Ht 1.778 m (5' 10\")  Wt 63.5 kg (140 lb)  SpO2 99%  BMI 20.09 kg/m2   O2: RA.   Output: Voiding in bathroom independently.   Last BM: 9/15/18.   Activity: Independent.   Skin: Scabs.   Pain: Denies.   CMS: Intact.   Dressing: NA.   Diet: Gluten free.   LDA: No IV access.   Equipment: Pillows, call light within reach.   Plan: Continue to monitor. [facility] to [facility] on Monday.   Additional Info: MSSA score 5, tremors.           "

## 2018-09-16 NOTE — PROVIDER NOTIFICATION
Moonlighter notified of pt asymptomatic with /114. Moonlighter instructed RN to continue to monitor pt.

## 2018-09-16 NOTE — PLAN OF CARE
Problem: Patient Care Overview  Goal: Interdisciplinary Rounds/Family Conf  Outcome: No Change    VS: stable   O2: Room air, lungs are clear   Output: Voiding independently in the bathroom   Last BM: 9/15/2018   Activity: Ambulates independently in the hallway   Skin: intact   Pain: none   CMS: Denies any numbness or tingling   Dressing: none   Diet: Regular, poor appetite   LDA: none   Equipment: none   Plan: [facility] for alcohol treatment   Additional Info:

## 2018-09-16 NOTE — PLAN OF CARE
Problem: Patient Care Overview  Goal: Plan of Care/Patient Progress Review  Outcome: No Change  A&O x4. Pt denied pain, nausea, SOB or numbness/tingling in extremities. Lung sounds clear and equal bilateral. Pt ambulating in hallway at start of shift. Pt voiding well. No BM this shift. Pt scored MSSA of 7 overnight. Pt sleeping on and off. Call light within reach and pt able to make needs known.

## 2018-09-17 PROCEDURE — 25000132 ZZH RX MED GY IP 250 OP 250 PS 637: Performed by: INTERNAL MEDICINE

## 2018-09-17 PROCEDURE — 12000001 ZZH R&B MED SURG/OB UMMC

## 2018-09-17 PROCEDURE — 25000132 ZZH RX MED GY IP 250 OP 250 PS 637: Performed by: PSYCHIATRY & NEUROLOGY

## 2018-09-17 PROCEDURE — 99232 SBSQ HOSP IP/OBS MODERATE 35: CPT | Performed by: INTERNAL MEDICINE

## 2018-09-17 RX ORDER — AMLODIPINE BESYLATE 2.5 MG/1
2.5 TABLET ORAL 2 TIMES DAILY
Status: DISCONTINUED | OUTPATIENT
Start: 2018-09-17 | End: 2018-09-18

## 2018-09-17 RX ORDER — HYDROXYZINE HYDROCHLORIDE 25 MG/1
25 TABLET, FILM COATED ORAL EVERY 4 HOURS PRN
Status: DISCONTINUED | OUTPATIENT
Start: 2018-09-17 | End: 2018-09-18 | Stop reason: HOSPADM

## 2018-09-17 RX ADMIN — MULTIPLE VITAMINS W/ MINERALS TAB 1 TABLET: TAB at 08:38

## 2018-09-17 RX ADMIN — HYDROXYZINE HYDROCHLORIDE 25 MG: 25 TABLET, FILM COATED ORAL at 11:39

## 2018-09-17 RX ADMIN — AMLODIPINE BESYLATE 2.5 MG: 2.5 TABLET ORAL at 19:31

## 2018-09-17 RX ADMIN — RANITIDINE 150 MG: 150 TABLET ORAL at 08:38

## 2018-09-17 RX ADMIN — AMLODIPINE BESYLATE 2.5 MG: 2.5 TABLET ORAL at 11:39

## 2018-09-17 RX ADMIN — ESCITALOPRAM OXALATE 10 MG: 10 TABLET ORAL at 08:38

## 2018-09-17 RX ADMIN — RANITIDINE 150 MG: 150 TABLET ORAL at 19:31

## 2018-09-17 RX ADMIN — FOLIC ACID 1 MG: 1 TABLET ORAL at 08:38

## 2018-09-17 ASSESSMENT — ACTIVITIES OF DAILY LIVING (ADL)
ADLS_ACUITY_SCORE: 9

## 2018-09-17 NOTE — PROGRESS NOTES
"[facility] Progress Note            Interval History:   Record reviewed.  Discussed with Dr. Starks.  Seen with RN.   Plan for DC to LP today.  Last ativan 9/14.  Issue of persistent significant BP elevation 165/104, 160/114.  H/o BP elevation (always high normal) without prior treatment. Mildly anxious.  Bounding heart beat.  No CP, SOB, cough.  Ambulatory without LH.  No nausea, reflux, abd pain.  BM LN (formed).  Voiding OK.            Medications:   All medications reviewed today          Physical Exam:   Blood pressure (!) 141/91, pulse 79, temperature 98.6  F (37  C), temperature source Oral, resp. rate 16, height 1.778 m (5' 10\"), weight 63.5 kg (140 lb), SpO2 99 %.  No intake or output data in the 24 hours ending 09/17/18 1443    General:  Alert.  Appropriate. Mildly anxious.  No distress.  No O2.     Heent:      Neck:    Skin:    Chest:  clear    Cardiac:  Reg without gallop, murmur.  No JVD.     Abdomen:  Non distended, soft, non-tender.  BS normal.     Extremities:  Perfused.  No edema, calf, posterior thigh tenderness to suggest DVT.     Neuro:  Minimal tremor.             Data:   No results found for this or any previous visit (from the past 24 hour(s)).  Lab Results   Component Value Date     09/16/2018     Lab Results   Component Value Date     09/16/2018     No results found for: BILICONJ   Lab Results   Component Value Date    BILITOTAL 0.6 09/16/2018     Lab Results   Component Value Date    ALBUMIN 3.8 09/16/2018     Lab Results   Component Value Date    PROTTOTAL 7.2 09/16/2018      Lab Results   Component Value Date    ALKPHOS 78 09/16/2018                Assessment and Plan:   1)  Alcohol dependency.  Continuous.   Planned LP.  2)  Alcohol WD, detoxed.  Last ativan 9/14.  3)  Nausea and vomiting 2nd to probable alcohol related gastritis and alcoholic hepatitis.  Resolved.   4)  Alcohol hepatitis with progressive rise LFT's (ALT/AST 9/16 246/122).    5)  " Metabolic acidosis 2nd to alcohol or starvation related ketosis resolved.  6)  BP elevation without diagnosis of HTN.  Persistent significant elevation.     PLAN:  1)  Defer DC (LP can take 9/18).  2)  Start norvasc 2.5 mg BID with parameters (BP down to 140/90 after 1st dose).  3)  Hydroxyzine prn anxiety.  4)  AM hepatic profile.  5)  Prob DC in AM to LP.   Disposition Plan   Expected discharge in 1 day to LP.      Entered: Felton Branch 09/17/2018, 2:43 PM              Attestation:  I have reviewed today's vital signs, notes, medications, labs and imaging.     Felton Branch MD

## 2018-09-17 NOTE — PROGRESS NOTES
Visit Date:   09/14/2018      CHEMICAL DEPENDENCY ASSESSMENT      EVALUATION COUNSELOR:  Rut Momin MA, [facility]   DATE OF EVALUATION:  09/14/2018.   PATIENT'S ADDRESS [address], [address] [place] [address].   PHONE NUMBER [phone].   STATISTICS:  YOB: 1985.  Age:  33.  Gender:  Male.  Marital Status:  .   PATIENT'S INSURANCE:  [employer] [place].   REFERRAL SOURCE:  Self.      REASON FOR EVALUATION:  The patient was seen on station 8a at [facility] for a CD evaluation because he knows he needs inpatient CD treatment programming.      HEALTH HISTORY AND MEDICATIONS:  Depression, anxiety, celiac disease.      CURRENT MEDICATIONS:  Amlodipine, Lexapro, folic acid, hydroxyzine.      HISTORY OF PREVIOUS TREATMENT AND COUNSELING:  The patient reports CD treatment in 2016 at [place] for outpatient.      HISTORY OF ALCOHOL AND DRUG USE:    Alcohol:  Age of first use as a kid.  Since 04/2018, he has been drinking a 1.75 liter in 2-3 days.  Last use 09/11/2018.    Nicotine:  Age of first use 16, 2-3 cigarettes per day.  Last use 09/10/2018.      SUMMARY OF CHEMICAL DEPENDENCY SYMPTOMS ACKNOWLEDGED BY THE PATIENT:  The patient identifies with 11 of the 11 DSM-V criteria for diagnostic impression of substance use disorder.      SUMMARY OF COLLATERAL DATA:   1.  The patient's medical record at [facility], [facility] was reviewed and the information contained in the medical record supported the patient's account of his chemical use history and chemical use consequences.   2.  Zaira Ansari, CD evaluator at [facility].  Zaira faxed her comprehensive assessment to this writer on 09/13/2018.  The assessment was reviewed and supported the patient's chemical use patterns and consequences.      VULNERABLE ADULT ASSESSMENT:  This [facility] patient or other residential/lodging CD treatment patient is a categorical  vulnerable adult according to [place] statute 626.557, subdivision 21.  This person has a history of abuse, but assessed as stable and not in need of an individual abuse prevention plan beyond the program abuse prevention plan.      IMPRESSION:   1.  Alcohol use disorder, severe, 303.90/F10.20.   2.  Tobacco use disorder, moderate, 305.10/F17.200.      [facility] PLACEMENT CRITERIA:   DIMENSION 1:  Acute Intoxication/Withdrawal Potential:  Risk level 0.  The patient's last use of alcohol was on 9/11/2018.  The patient has been hospitalized for alcohol withdrawal since 09/11/2018.  All withdrawal issues are resolved.      DIMENSION 2:  Biomedical Conditions and Complications:  Risk level 1.  The patient has celiac disease and requires a gluten-free diet.  He reports all other health issues are resolved.      DIMENSION 3:  Emotional/Behavioral/Cognitive Conditions and Complications:  Risk level 2.  The patient denies a mental health diagnosis or being on any medications.  He reports symptoms of depression and anxiety for the past month.  The patient admits to intentionally not eating as a suicide attempt for 5 days leading up to the ED admission on 9/11/2018. The patient was reassessed by Medical psychiatrist, Dr. Redd Sams on 9/15/2018 and was prescribed Lexapro and given a mental health diagnosis of depression and anxiety.      DIMENSION 4:  Readiness for Change:  Risk level 1.  The patient is motivated to attend inpatient CD treatment program.  He knew he had a problem with alcohol since 2016 when he first entered CD treatment program.  The patient appears to be in the preparation stage within the stage of change model.      DIMENSION 5:  Relapse, Continued Use Potential:  Risk level 4.  The patient has attended 1 CD treatment program in 2016, and relapsed a week after discharge at a family wedding.  The patient lacks sober coping skills.  He does not have any sober supports outside of his wife.  He reports all of  his family drinks.  The patient lacks knowledge of his relapse triggers and warning signs.  He is at high risk for continued use.      DIMENSION 6:  Recovery Environment:  Risk level 3.  The patient is living with his wife of 11 years.  They do not have any children.  He is unemployed after being fired from his job in 2018 for smelling of alcohol.  His wife is concerned about his drinking.  The patient denies having any legal issues.      RECOMMENDATIONS:   1.  Complete a residential based or similar treatment program such as [facility].   2.  Abstain from all mood-altering chemicals unless prescribed by a licensed provider.   3.  Attend at minimum 2 weekly support group meetings such as 12-step based, SMART Recovery, Health Realizations, etc.    4.  Actively work with a male sponsor on a weekly basis.   5.  Follow all recommendations of your treatment/medical providers.      INITIAL PROBLEM LIST:   1.  The patient's co-occurring disorders.   2.  The patient lacks sober support network.   3.  The patient lacks relapse prevention techniques.         This information has been disclosed to you from records protected by Federal confidentiality rules (42 CFR part 2). The Federal rules prohibit you from making any further disclosure of this information unless further disclosure is expressly permitted by the written consent of the person to whom it pertains or as otherwise permitted by 42 CFR part 2. A general authorization for the release of medical or other information is NOT sufficient for this purpose. The Federal rules restrict any use of the information to criminally investigate or prosecute any alcohol or drug abuse patient.      ELIZABETH ROGERS CD             D: 2018   T: 2018   MT: ANABEL      Name:     JAMES LOW   MRN:      [id number]        Account:      [id number]   :      1985           Visit Date:   2018      Document: [id number]

## 2018-09-17 NOTE — PLAN OF CARE
Problem: Alcohol Withdrawal Acute, Risk/Actual (Adult)  Goal: Signs and Symptoms of Listed Potential Problems Will be Absent, Minimized or Managed (Alcohol Withdrawal Acute, Risk/Actual)  Signs and symptoms of listed potential problems will be absent, minimized or managed by discharge/transition of care (reference Alcohol Withdrawal Acute, Risk/Actual (Adult) CPG).   Pt A/O X 4. Afebrile. VSS except Blood pressure 169/114, scheduled Norvasc given, blood pressure recheck 152/104 and  notified. Lungs-Clear bilaterally with both anterior and posterior. Bowels-Hyperactive in all four quadrants. Voids spontaneously without difficulty in the bathroom. Denies nausea and vomiting. CMS and Neuro's are intact. Denies numbness and tingling in all extremities. Denies pain. Is on a Gluten Free diet and appetite was Fair this shift. Pt is on the MSSA protocol with scores of 7, and 4 this shift. Pt up in room and hallways independently. No PIV access. Bilateral heels are elevated off the bed. Pt is able to make needs known, and call light is within reach. Continue to monitor.

## 2018-09-17 NOTE — PLAN OF CARE
Problem: Patient Care Overview  Goal: Plan of Care/Patient Progress Review  VS: VSS except for elevated BPs MD are aware of pt's hypertension throughout hospitalization    O2: RA.   Output: Voiding in bathroom independently.   Last BM: 9/15/18.   Activity: Independent.   Skin: Scabs.   Pain: Denies.   CMS: Intact.   Dressing: NA.   Diet: Gluten free.   LDA: No IV access.   Equipment: Pillows, call light within reach.   Plan: [facility] 9/17   Additional Info: MSSA score 2

## 2018-09-17 NOTE — DISCHARGE SUMMARY
Admit Date:     09/11/2018   Discharge Date:           DATE OF ANTICIPATED DISCHARGE:  09/17/2018 to [facility].      HISTORY:  Glenn Simons is a 33-year-old man with a history of ethanol abuse who was admitted through the ER to the medical unit for the evaluation of nausea and vomiting in the setting of ongoing alcohol use.  The patient presented with a significant elevated anion gap acidosis with ketosis felt likely secondary to starvation/alcohol.      HOSPITAL COURSE:  The patient was given intravenous fluids in the ER and after admission.  His elevated anion gap gradually resolved by the next morning.  The patient's nausea, vomiting rapidly improved with supportive treatments.  He did have mild alcohol withdrawal manifested by tremors, which was managed with infrequent dosing of lorazepam.  The patient's liver function tests were monitored.  They actually did trend up during this hospitalization.  ALT on 09/16 was up to 246, AST was 122.  Bilirubin declined to normal during the hospitalization.  The patient did not have imaging of his liver as most likely this is related to alcohol use, though the relative predominant elevation of ALT is somewhat unusual.  The patient is aware that this needs to be followed up to assure resolution.  The patient was hypophosphatemic which did resolve with phosphorus replacement.        The patient did have intermittent elevations of his blood pressure.  There is no known history of hypertension.  It is suspected this is related to anxiety and/or an element of withdrawal.  At the time of this dictation, it is anticipated that he will not be discharged on antihypertensive medications, but will need followup for this.      The patient was seen by psychiatry and was placed on Lexapro 10 mg daily prior to discharge.      DISCHARGE DIAGNOSES:   1.  Ethanol dependency with ongoing use prior to admission.   2.  Nausea, vomiting, dehydration with alcoholic/starvation  "ketoacidosis, resolved with intravenous fluids.   3.  Hyperphosphatemia, resolved.   4.  Elevated transaminases likely representing alcoholic hepatitis, though the relative preponderance of the ALT is somewhat atypical with labile blood pressures without a formal history of hypertension.  The patient was discharged to [facility].      DISCHARGE MEDICATIONS:  Thiamine 100 mg daily, Lexapro 10 mg daily, multivitamins once a day.        DISCHARGE INSTRUCTIONS:  He was instructed to follow up with his primary physician within the next 1 month to have his blood pressure and liver function tests repeated.         MARIN KIM MD             D: 2018   T: 2018   MT: NTS      Name:     JAMES LOW   MRN:      [id number]        Account:        [id number]   :      1985           Admit Date:     2018                                  Discharge Date:       Document: [id number]       cc: Ruslan Johnson MD     ADD:  DC deferred  with issue significant persistent BP elevation.  Today's progress note:     Record reviewed. Seen with RN.   Clinically doing well.    Last ativan .  Hydroxyzine 25 mg 9/17 X1 for anxiety with equivocal results.  Amlodipine 2.5 mg BID  for persistent significant BP elevation with consolidation to 5 mg daily this AM.  No postural dizziness.  Mild HA (before amlodipine).  No CP, SOB, appreciable cough.  No nausea, reflux.  Tolerating diet.  No abdominal pain.  Formed BM tthis AM.  Voiding OK. .            Medications:   All medications reviewed today           Physical Exam:   Blood pressure (!) 142/99, pulse 66, temperature 97.1  F (36.2  C), resp. rate 16, height 1.778 m (5' 10\"), weight 63.5 kg (140 lb), SpO2 99 %.  No intake or output data in the 24 hours ending 18 1443     General:  Alert.  Appropriate.   No distress.  No O2.  Recheck /77 standing after AM amlodipine 5 mg.      Heent:       Neck:     Skin:     Chest:  clear     Cardiac:  Reg " without gallop, murmur.  No JVD.      Abdomen:  Non distended, soft, non-tender.  BS normal.      Extremities:  Perfused.  No edema, calf, posterior thigh tenderness to suggest DVT.      Neuro:  Minimal tremor (suspect baseline). .               Data:            Results for orders placed or performed during the hospital encounter of 09/11/18 (from the past 24 hour(s))   Comprehensive metabolic panel   Result Value Ref Range     Sodium 141 133 - 144 mmol/L     Potassium 4.2 3.4 - 5.3 mmol/L     Chloride 104 94 - 109 mmol/L     Carbon Dioxide 28 20 - 32 mmol/L     Anion Gap 9 3 - 14 mmol/L     Glucose 82 70 - 99 mg/dL     Urea Nitrogen 13 7 - 30 mg/dL     Creatinine 1.20 0.66 - 1.25 mg/dL     GFR Estimate 69 >60 mL/min/1.7m2     GFR Estimate If Black 84 >60 mL/min/1.7m2     Calcium 9.0 8.5 - 10.1 mg/dL     Bilirubin Total 0.8 0.2 - 1.3 mg/dL     Albumin 3.8 3.4 - 5.0 g/dL     Protein Total 7.0 6.8 - 8.8 g/dL     Alkaline Phosphatase 64 40 - 150 U/L      (H) 0 - 70 U/L     AST 72 (H) 0 - 45 U/L              Lab Results   Component Value Date      09/16/2018              Lab Results   Component Value Date      09/16/2018      No results found for: BILICONJ           Lab Results   Component Value Date     BILITOTAL 0.6 09/16/2018              Lab Results   Component Value Date     ALBUMIN 3.8 09/16/2018              Lab Results   Component Value Date     PROTTOTAL 7.2 09/16/2018       Lab Results   Component Value Date     ALKPHOS 78 09/16/2018                        Assessment and Plan:    1)  Alcohol dependency.  Continuous.   Planned LP.  2)  Alcohol WD, detoxed.  Last ativan 9/14.  3)  Nausea and vomiting 2nd to probable alcohol related gastritis and alcoholic hepatitis.  Resolved.   4)  Alcohol hepatitis, improved.   5)  Metabolic acidosis 2nd to alcohol or starvation related ketosis resolved.  6)  BP elevation without diagnosis of HTN.  Persistent significant elevation 9/17.  Improved,  adequate.  Goal not to over treat if BP normalizes with time.      PLAN:  1)  Clinically stable for DC to LP.  2)  Meds, orders amended.  Amlodipine 5 mg daily with parameters.  Copy of labs provided to patient.  3)  Monitor BP before taking amlodipine - do not take if /80 or less. Appointment primary care provider within the week for recheck hepatic profile and BP follow up.  Disposition Plan   Expected discharge today to LP.

## 2018-09-18 ENCOUNTER — HOSPITAL ENCOUNTER (OUTPATIENT)
Dept: BEHAVIORAL HEALTH | Facility: CLINIC | Age: 33
End: 2018-09-18
Attending: FAMILY MEDICINE
Payer: COMMERCIAL

## 2018-09-18 VITALS
RESPIRATION RATE: 16 BRPM | HEIGHT: 70 IN | WEIGHT: 140 LBS | SYSTOLIC BLOOD PRESSURE: 142 MMHG | BODY MASS INDEX: 20.04 KG/M2 | OXYGEN SATURATION: 99 % | DIASTOLIC BLOOD PRESSURE: 99 MMHG | HEART RATE: 66 BPM | TEMPERATURE: 97.1 F

## 2018-09-18 VITALS — TEMPERATURE: 97.1 F | HEART RATE: 75 BPM | SYSTOLIC BLOOD PRESSURE: 154 MMHG | DIASTOLIC BLOOD PRESSURE: 113 MMHG

## 2018-09-18 DIAGNOSIS — Z71.6 ENCOUNTER FOR SMOKING CESSATION COUNSELING: Primary | ICD-10-CM

## 2018-09-18 LAB
ALBUMIN SERPL-MCNC: 3.8 G/DL (ref 3.4–5)
ALP SERPL-CCNC: 64 U/L (ref 40–150)
ALT SERPL W P-5'-P-CCNC: 212 U/L (ref 0–70)
ANION GAP SERPL CALCULATED.3IONS-SCNC: 9 MMOL/L (ref 3–14)
AST SERPL W P-5'-P-CCNC: 72 U/L (ref 0–45)
BILIRUB SERPL-MCNC: 0.8 MG/DL (ref 0.2–1.3)
BUN SERPL-MCNC: 13 MG/DL (ref 7–30)
CALCIUM SERPL-MCNC: 9 MG/DL (ref 8.5–10.1)
CHLORIDE SERPL-SCNC: 104 MMOL/L (ref 94–109)
CO2 SERPL-SCNC: 28 MMOL/L (ref 20–32)
CREAT SERPL-MCNC: 1.2 MG/DL (ref 0.66–1.25)
GFR SERPL CREATININE-BSD FRML MDRD: 69 ML/MIN/1.7M2
GLUCOSE SERPL-MCNC: 82 MG/DL (ref 70–99)
POTASSIUM SERPL-SCNC: 4.2 MMOL/L (ref 3.4–5.3)
PROT SERPL-MCNC: 7 G/DL (ref 6.8–8.8)
SODIUM SERPL-SCNC: 141 MMOL/L (ref 133–144)

## 2018-09-18 PROCEDURE — H2035 A/D TX PROGRAM, PER HOUR: HCPCS

## 2018-09-18 PROCEDURE — 25000132 ZZH RX MED GY IP 250 OP 250 PS 637: Performed by: PSYCHIATRY & NEUROLOGY

## 2018-09-18 PROCEDURE — 99232 SBSQ HOSP IP/OBS MODERATE 35: CPT | Performed by: INTERNAL MEDICINE

## 2018-09-18 PROCEDURE — 36415 COLL VENOUS BLD VENIPUNCTURE: CPT | Performed by: INTERNAL MEDICINE

## 2018-09-18 PROCEDURE — 99207 ZZC CDG-MDM COMPONENT: MEETS LOW - DOWN CODED: CPT | Performed by: INTERNAL MEDICINE

## 2018-09-18 PROCEDURE — 25000132 ZZH RX MED GY IP 250 OP 250 PS 637: Performed by: INTERNAL MEDICINE

## 2018-09-18 PROCEDURE — 80053 COMPREHEN METABOLIC PANEL: CPT | Performed by: INTERNAL MEDICINE

## 2018-09-18 PROCEDURE — H2035 A/D TX PROGRAM, PER HOUR: HCPCS | Mod: HQ

## 2018-09-18 PROCEDURE — 10020000 ZZH LODGING PLUS FACILITY CHARGE ADULT

## 2018-09-18 RX ORDER — AMLODIPINE BESYLATE 5 MG/1
5 TABLET ORAL DAILY
Status: DISCONTINUED | OUTPATIENT
Start: 2018-09-18 | End: 2018-09-18 | Stop reason: HOSPADM

## 2018-09-18 RX ORDER — NICOTINE 21 MG/24HR
1 PATCH, TRANSDERMAL 24 HOURS TRANSDERMAL EVERY 24 HOURS
Qty: 30 PATCH | Refills: 1 | Status: SHIPPED | OUTPATIENT
Start: 2018-09-18 | End: 2018-11-27

## 2018-09-18 RX ORDER — AMLODIPINE BESYLATE 5 MG/1
5 TABLET ORAL DAILY
Qty: 30 TABLET | Refills: 0 | Status: SHIPPED | OUTPATIENT
Start: 2018-09-19 | End: 2018-09-18

## 2018-09-18 RX ORDER — HYDROXYZINE HYDROCHLORIDE 25 MG/1
25 TABLET, FILM COATED ORAL EVERY 6 HOURS PRN
Qty: 15 TABLET | Refills: 0 | Status: SHIPPED | OUTPATIENT
Start: 2018-09-18

## 2018-09-18 RX ORDER — AMLODIPINE BESYLATE 5 MG/1
5 TABLET ORAL DAILY
Qty: 30 TABLET | Refills: 0 | Status: SHIPPED | OUTPATIENT
Start: 2018-09-19

## 2018-09-18 RX ADMIN — FOLIC ACID 1 MG: 1 TABLET ORAL at 08:19

## 2018-09-18 RX ADMIN — MULTIPLE VITAMINS W/ MINERALS TAB 1 TABLET: TAB at 08:19

## 2018-09-18 RX ADMIN — ESCITALOPRAM OXALATE 10 MG: 10 TABLET ORAL at 08:19

## 2018-09-18 RX ADMIN — RANITIDINE 150 MG: 150 TABLET ORAL at 08:19

## 2018-09-18 RX ADMIN — AMLODIPINE BESYLATE 5 MG: 5 TABLET ORAL at 08:22

## 2018-09-18 ASSESSMENT — ANXIETY QUESTIONNAIRES
1. FEELING NERVOUS, ANXIOUS, OR ON EDGE: MORE THAN HALF THE DAYS
3. WORRYING TOO MUCH ABOUT DIFFERENT THINGS: MORE THAN HALF THE DAYS
2. NOT BEING ABLE TO STOP OR CONTROL WORRYING: MORE THAN HALF THE DAYS
6. BECOMING EASILY ANNOYED OR IRRITABLE: SEVERAL DAYS
5. BEING SO RESTLESS THAT IT IS HARD TO SIT STILL: MORE THAN HALF THE DAYS
7. FEELING AFRAID AS IF SOMETHING AWFUL MIGHT HAPPEN: NOT AT ALL
GAD7 TOTAL SCORE: 11
4. TROUBLE RELAXING: MORE THAN HALF THE DAYS

## 2018-09-18 ASSESSMENT — ACTIVITIES OF DAILY LIVING (ADL)
ADLS_ACUITY_SCORE: 9

## 2018-09-18 NOTE — PROGRESS NOTES
Initial Services Plan        Service Initiation Date: 9/18/2018    Immediate health and/or safety concerns: Yes    Identify health and safety concern(s) below and include plan to address:    None Identified    Client issues to be addressed in the first treatment sessions:     Fear of being with strangers  Fear of adjusting to roommate or different environment  Fear of failing  Fear of being in a group and/or speaking in front of people    Treatment suggestions for client during the time between intake (admit date) and completion of the individual treatment plan:     Look for a sober support network, i.e. 12 step, [facility], [facility], etc  Tour the treatment center or outpatient clinic  Introduce yourself to your treatment group. Spend time getting to know your peers  Review your patient or client handbook  Begin working on your treatment goal list      Completed by: ROSEMARY Damian  Date completed: 9/18/2018 at 11:57 AM

## 2018-09-18 NOTE — PROGRESS NOTES
"[facility] Progress Note            Interval History:   Record reviewed. Seen with RN.   Clinically doing well.    Last ativan 9/14.  Hydroxyzine 25 mg 9/17 X1 for anxiety with equivocal results.  Amlodipine 2.5 mg BID 9/17 for persistent significant BP elevation with consolidation to 5 mg daily this AM.  No postural dizziness.  Mild HA (before amlodipine).  No CP, SOB, appreciable cough.  No nausea, reflux.  Tolerating diet.  No abdominal pain.  Formed BM tthis AM.  Voiding OK. .           Medications:   All medications reviewed today          Physical Exam:   Blood pressure (!) 142/99, pulse 66, temperature 97.1  F (36.2  C), resp. rate 16, height 1.778 m (5' 10\"), weight 63.5 kg (140 lb), SpO2 99 %.  No intake or output data in the 24 hours ending 09/17/18 1443    General:  Alert.  Appropriate.   No distress.  No O2.  Recheck /77 standing after AM amlodipine 5 mg.     Heent:      Neck:    Skin:    Chest:  clear    Cardiac:  Reg without gallop, murmur.  No JVD.     Abdomen:  Non distended, soft, non-tender.  BS normal.     Extremities:  Perfused.  No edema, calf, posterior thigh tenderness to suggest DVT.     Neuro:  Minimal tremor (suspect baseline). .             Data:     Results for orders placed or performed during the hospital encounter of 09/11/18 (from the past 24 hour(s))   Comprehensive metabolic panel   Result Value Ref Range    Sodium 141 133 - 144 mmol/L    Potassium 4.2 3.4 - 5.3 mmol/L    Chloride 104 94 - 109 mmol/L    Carbon Dioxide 28 20 - 32 mmol/L    Anion Gap 9 3 - 14 mmol/L    Glucose 82 70 - 99 mg/dL    Urea Nitrogen 13 7 - 30 mg/dL    Creatinine 1.20 0.66 - 1.25 mg/dL    GFR Estimate 69 >60 mL/min/1.7m2    GFR Estimate If Black 84 >60 mL/min/1.7m2    Calcium 9.0 8.5 - 10.1 mg/dL    Bilirubin Total 0.8 0.2 - 1.3 mg/dL    Albumin 3.8 3.4 - 5.0 g/dL    Protein Total 7.0 6.8 - 8.8 g/dL    Alkaline Phosphatase 64 40 - 150 U/L     (H) 0 - 70 U/L    AST 72 (H) " 0 - 45 U/L     Lab Results   Component Value Date     09/16/2018     Lab Results   Component Value Date     09/16/2018     No results found for: BILICONJ   Lab Results   Component Value Date    BILITOTAL 0.6 09/16/2018     Lab Results   Component Value Date    ALBUMIN 3.8 09/16/2018     Lab Results   Component Value Date    PROTTOTAL 7.2 09/16/2018      Lab Results   Component Value Date    ALKPHOS 78 09/16/2018                Assessment and Plan:   1)  Alcohol dependency.  Continuous.   Planned LP.  2)  Alcohol WD, detoxed.  Last ativan 9/14.  3)  Nausea and vomiting 2nd to probable alcohol related gastritis and alcoholic hepatitis.  Resolved.   4)  Alcohol hepatitis, improved.   5)  Metabolic acidosis 2nd to alcohol or starvation related ketosis resolved.  6)  BP elevation without diagnosis of HTN.  Persistent significant elevation 9/17.  Improved, adequate.  Goal not to over treat if BP normalizes with time.     PLAN:  1)  Clinically stable for DC to LP.  2)  Meds, orders amended.  Amlodipine 5 mg daily with parameters.  Copy of labs provided to patient.  3)  Monitor BP before taking amlodipine - do not take if /80 or less. Appointment primary care provider within the week for recheck hepatic profile and BP follow up.  Disposition Plan   Expected discharge today to LP.      Entered: Felton Branch 09/18/2018, 10:47 AM              Attestation:  I have reviewed today's vital signs, notes, medications, labs and imaging.     Felton Branch MD

## 2018-09-18 NOTE — PLAN OF CARE
Problem: Patient Care Overview  Goal: Plan of Care/Patient Progress Review  Pt. A&Ox4. VSS, ex. Elevated BP meds given on evening shift last 143/101 moonlighter aware. Afebrile. Lung sounds CTA. Maintaining sats on RA. Bowel sounds active, LBM 9/17. PP+ DP+. CMS and neuro's are intact. Denies numbness and tingling in all extremities. Denies nausea, shortness of breath, and chest pain. Denies pain. Voids spontaneously without difficulty. Tolerating gluten free diet. Pt up independently. Call light is within reach, pt able to make needs known. Will continue to monitor.

## 2018-09-18 NOTE — PROGRESS NOTES
[facility] Nursing Health Assessment      Vital signs:     BP (!) 154/113  Pulse 75  Temp 97.1  F (36.2  C)      Direct admission    Counselor: Jimmie   Drug of Choice: Alcohol   Last use: Alcohol: 9/11/18  Home clinic/MD: Karla Johnson   Psychiatrist/therapist: None    Medical history/current conditions:  HTN, hospitalized on 8A for metabolic acidosis, celiac, dyslexic     H&P Screen:  H&P within the last 90 days: No.  Patient has been informed they are required to obtain an H&P within the next 14 days.        Mental Health diagnosis: Anxiety and depression   Medication compliant?: Yes  Recent sucidal thoughts? When intoxicated, ok now     When? Recently prior to hospitalization, no thoughts or plans now   Current thought of self-harm? Not currently     Plan? None     Pain assessment:   Pt. Experiencing pain at this time?  No      Nursing Assessment Summary:  Medically stable no acute concerns    On-going nursing intervention required?   No    Acute care visit recommended: no     Dori Goode RN

## 2018-09-18 NOTE — PROGRESS NOTES
Discharge instructions given to patient.  No further questions from patient.  Patient will be transferred to [facility].

## 2018-09-18 NOTE — PLAN OF CARE
Problem: Alcohol Withdrawal Acute, Risk/Actual (Adult)  Goal: Signs and Symptoms of Listed Potential Problems Will be Absent, Minimized or Managed (Alcohol Withdrawal Acute, Risk/Actual)  Signs and symptoms of listed potential problems will be absent, minimized or managed by discharge/transition of care (reference Alcohol Withdrawal Acute, Risk/Actual (Adult) CPG).   Outcome: Improving    VS:     VSS on RA,    Output:     Voids using bathroom independent  BM 9/16 and passing gas   Activity:     UP independent   Skin: WDL   Pain:     PRN medications not given this shift  Alternative therapies offered   Neuro/CMS:     no numbness or no tingling   Dressing:     NA   Diet:     Gluten free diet, tolerated well, no N/V   LDA:     IV access lost   Equipment:        Plan:     Continue plan of care.   Additional Info:     Pt to DC to [facility] in the am.

## 2018-09-18 NOTE — PROGRESS NOTES
Progress Note       This patient had a comprehensive assessment on 9/14/2018 completed by Rut RILEY.  This patient was seen for a face to face update of the comprehensive assessment on 9/18/2018 by ROSEMARY Damian:  Yes    Alcohol/Drug use since the last CD evaluation (include date of last use):     No additional substances use since the last CD evaluation       Please note any other clinical changes since the last CD evaluation (such as medication changes, additional legal charges, detoxification admissions, overdoses, etc.)     No significant changes since the last CD evaluation         ASAM Dimensions Original scores Current Scores   I.) Intoxication and Withdrawal: 0 0   II.) Biomedical:  1 1   III.) Emotional and Behavioral:  2 2   IV.) Readiness to Change:  1 1   V.) Relapse Potential: 4 4   VI.) Recovery Environmental: 3 3     Please list clinical justifications for the above ASAM score changes since the original comprehensive assessment:     None of the ASAM scores on the six dimensions had changed since the original comprehensive assessment was completed on 9/14/2018.         Current AUNDREA: Current UA:     .000       Pt was not UA'd since he transferred directly from  to [facility].       PHQ-9, DAIANA-7   PHQ-9 on 9/18/2018 DAIANA-7 on 9/18/2018   The patient's PHQ-9 score was 18 out of 27, indicating moderately severe depression.     The patient's DAIANA-7 score was 11 out of 21, indicating moderate anxiety.         [place]-Suicide Severity Rating Scale Reassessment   Have you ever wished you were dead or that you could go to sleep and not wake up?  Past Month:  No, not since he saw me on 9/14/2018   Have you actually had any thoughts of killing yourself?  Past Month:  No, not since he saw me on 9/14/2018   Have you been thinking about how you might do this?     Past Month:  Yes, Describe: 5 days prior to entering the ED at [facility] on 9/11/2018, he intentionally stopped eating.  Lifetime:   Yes, Describe: In college he tried once by binging on gluten food because he was depressed at the time.   Have you had these thoughts and had some intention of acting on them?     Past Month:  Yes, Describe: see above Lifetime:  Yes, Describe: see above   Have you started to work out the details of how to kill yourself?   Past Month:  Yes, Describe: see above Lifetime:  Yes, Describe: see above   Do you intend to carry out this plan?   Yes, Describe: He stopped eating for 5 days prior to admitting to the ED on 9/11/2018   When you have the thoughts how long do they last?  1-4 hours/a lot of time   Are there things - anyone or anything (i.e. family, [facility], pain of death) that stopped you from wanting to die or acting on thoughts of suicide?  Protective factors definitely stopped you from attempting suicide     2008  [employer]  Used with permission by Molly Soto, PhD.       Guide to C-SSRS Risk Ratings   NO IDEATION:  with no active thoughts IDEATION: with a wish to die. IDEATION: with active thoughts. Risk Ratings   If Yes No No 0 - Very Low Risk   If NA Yes No 1 - Low Risk   If NA Yes Yes 2 - Low/moderate risk   IDEATION: associated thoughts of methods without intent or plan INTENT: Intent to follow through on suicide PLAN: Plan to follow through on suicide Risk Ratings cont...   If Yes No No 3 - Moderate Risk   If Yes Yes No 4 - High Risk   If Yes Yes Yes 5 - High Risk   The patient's ADDITIONAL RISK FACTORS and lack of PROTECTIVE FACTORS may increase their overall suicide risk ratings.     Additional Risk Factors:    Significant history of having untreated or poorly treated mental health symptoms     Significant history of physical illness or chronic medical problems     Tendency to be socially isolated and/or cut off from the support of others     A recent death of someone close to the patient and/or unresolved grief and loss issues     A recent loss that was  "significant to the patient, i.e. loss of job, loss of home, divorce, break-up, etc.     History of impulsive or aggressive behaviors   Protective Factors:    Having people in his/her life that would prevent the patient from considering a suicide attempt (i.e. young children, spouse, parents, etc.)     Risk Status   1. - Low Risk: Evaluation Counselors:  Document in Epic / SBAR to counselor \"Low Risk\".      Treatment Counselors:  Reassess upon admission as applicable, assess weekly in progress notes under Dimension 3 and summarize in Discharge / Treatment summary under Dimension 3.   Additional information to support suicide risk rating: There was no addtional information to provide at this time.  Please see the above suicide risk rating information.       "
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
## 2018-09-19 ENCOUNTER — HOSPITAL ENCOUNTER (OUTPATIENT)
Dept: BEHAVIORAL HEALTH | Facility: CLINIC | Age: 33
End: 2018-09-19
Attending: FAMILY MEDICINE
Payer: COMMERCIAL

## 2018-09-19 ENCOUNTER — PATIENT OUTREACH (OUTPATIENT)
Dept: CARE COORDINATION | Facility: CLINIC | Age: 33
End: 2018-09-19

## 2018-09-19 PROBLEM — F19.20 CHEMICAL DEPENDENCY (H): Status: ACTIVE | Noted: 2018-09-19

## 2018-09-19 PROCEDURE — H2035 A/D TX PROGRAM, PER HOUR: HCPCS | Mod: HQ

## 2018-09-19 PROCEDURE — 10020000 ZZH LODGING PLUS FACILITY CHARGE ADULT

## 2018-09-19 PROCEDURE — H2035 A/D TX PROGRAM, PER HOUR: HCPCS

## 2018-09-19 NOTE — PROGRESS NOTES
"  Patient:  Glenn Simons    Date: September 19, 2018    Comprehensive Assessment UPDATE        Comprehensive Summary Update and Review  Counselor met with patient on 9/19/2018 and reviewed the Comprehensive Assessment.    The following updates have been made:Other:  Dimension 1: Patient's risk rating was raised from \"0\" to \"1\" due to patient reporting minimal withdrawal symptoms.    Dimension 4:  Patients risk rating was raised from \"1\" to \"2\" due to patient denying any long-term sobriety, having attended past treatments for external purposes and his wife encouraging him to attend [facility], and due to patient showing a passive regard for the various consequences of his use.    Aysha Soni, ADC-T    "

## 2018-09-19 NOTE — PROGRESS NOTES
"Comprehensive Assessment Summary     Based on client interview, review of previous assessments and   comprehensive assessment interview the following diagnosis and recommendations are:     Patient: Glenn Simons  MRN; [id number]   : 1985  Age: 33 year old Sex: male       Client meets criteria for:  303.90 (F10.20) - Alcohol Use Disorder Severe  305.10 (F17.200) - Tobacco Use Disorder Moderate    Dimension One: Acute Intoxication/Withdrawal Potential     Ratin      (Consider the client's ability to cope with withdrawal symptoms and current state of intoxication)     Patient entered into [facility] on 2018. Patient transferred directly from [facility] Floor 10 and 8. Patient reports his substance of use as Alcohol. Patient reports his first use of alcohol was when he was a child, his grandparents would give him wine to help him fall asleep. Patient reports his use increased 5 years ago. Upon admissions patient reports having used 0.5-1 liter of alcohol daily. Patient reports that when he entered into [facility] he blew a 0.07. Patient reports experiencing minor withdrawl symptoms, most noticeable \"jittering\". Patient reports that his withdrawal symptoms will not interfere with full participation in treatment programming. Patient will continue to be monitored throughout treatment. Patient's risk rating was raised from \"0\" to \"1\" due to patient reporting minimal withdrawal symptoms.      Dimension Two: Biomedical Condition and Complications    Ratin    (Consider the degree to which any physical disorder would interfere with treatment for substance abuse, and the client's ability to tolerate any related discomfort; determine the impact of continued chemical use on the unborn child if the client is pregnant)    Upon admissions patient reports a medical condition of Celiac Disease, which he has been diagnosed with since birth. Patient reports that his condition is under control so long as he " "watches his diet. [employer] Staff RN is informed of patient's condition. Patient reports that he does have a primary care provider. Patient reports seeing Dr. Johnson at [facility] in [place]. Patient reports increased blood pressure as a result of being prescribed lexapro. Patient has since then been placed on blood thinners. Patient reports taking his medications as prescribed. Patient reports that his medical concerns will not interfere with full participation in treatment programming. Patient appears able to acces medical aid as needed. Patient will continue to be monitored throughout treatment.     Dimension Three: Emotional/Behavioral/Cognitive Conditions & Complications    Ratin    (Determine the degree to which any condition or complications are likely to interfere with treatment for substance abuse or with functioning in significant life areas and the likelihood of risk of harm to self or others)    Upon admissions patient denies any formal mental health diagnoses. Patient reports experiencing symptoms of depression. Patient reports having met with a therapist on two occassions to address these concerns while in outpatient, patient denies regular contact. Patient reports that he is open to therapy while at [facility]. Patient reports having had had past suicidal thoughts. Patient reports his first thoughts of suicide/suicide attempt was in college when he was unable to get a degree, and the market beginning to crash. Patient reports his most recent period of suicidal thoughts or ideations was \"a few weeks ago\". Patient reports this came about due to having no job and \"too much time on my hands\". Upon admissions patient was given a suicidal risk screening. Patient was rated as \"low risk\". Patient denies any suicidal thoughts or ideations at this time. Patient will continue to be monitored throughout treatment. Patient denies any abuse in his family of origin but states that his father used alcohol " "heavily and his mother on occasion. Patient reports that he has an \"Okay\" relationship with his parents at this time. Patient reports having experienced past trauma when five years ago, when he first began to use heavily, he had attended 13 funerals in 12 months. Patient reports most recently having lost his wife's grandmother. Patient reports that he had never worked through the grief from these events. Patient reports that he coped through using.     Dimension Four: Treatment Acceptance/Resistance     Ratin    (Consider the amount of support and encouragement necessary to keep the client involved in treatment)    Upon admissions patient reports being motivated for recovery. Patient reports [facility] being his second treatment facility. Patient reports his main motivation for attending treatment as \"to get a regular schedule\" and \"to heal up\". Patient was asked to rate his motivation for recovery on a scale of 1(low) to 10(high), patient reported \"7 to 8\". Patient denies external motivation, but states that his wife encouraged him to attend treatment. Patients risk rating was raised from \"1\" to \"2\" due to patient denying any long-term sobriety, having attended past treatments for external purposes and his wife encouraging him to attend [facility], and due to patient showing a passive regard for the various consequences of his use.     Dimension Five: Continued Use/Relaspe Prevention     Ratin    (Consider the degree to which the client's recognizes relapse issues and has the skills to prevent relapse of either substance use or mental health problems)   Upon admissions patient reports having attended one treatment faculty prior to [facility] that was required from his employer. Patient reports having attended an outpatient program in  for 3 months. Patient reports that immediately following OP he began using again. Patient reports his longest period of sobriety was the three months he was in OP. " "Patient reports that he has no notable coping skills. Patient repots that he would use for any reason with no particular triggers. Patient appears to lack sober living skills and long-term sober maintenance skills. Patient appears at high risk for relapse at this time.     Dimension Six: Recovery Environment     Rating: 3     (Consider the degree to which key areas of the client's life are supportive of or antagonistic to treatment participation and recovery)     Upon admissions patient reports being  to his wife of 11 years. Patient reports that she does not use alcohol. Patient reports that she is supportive of his recovery. Patient denies any children. Patient reports that he and his wife are living in a campus apartment while his wife goes to college. Patient reports that he plans to return to this environment following [facility]. Patient reports that he is currently unemployed. patient reports that he lost his job in April due to alcohol use. Patient reports this job loss caused him to experience an increase in depressive thoughts. Patient plans to obtain part-time employment. Patient denies any legal charges at this time. Patient denies having completed his college education.     I have reviewed the information on the assessment, psychosocial and medical history and checklist:        the following changes have been made    Dimension 1: Patient's risk rating was raised from \"0\" to \"1\" due to patient reporting minimal withdrawal symptoms.    Dimension 4:  Patients risk rating was raised from \"1\" to \"2\" due to patient denying any long-term sobriety, having attended past treatments for external purposes and his wife encouraging him to attend [facility], and due to patient showing a passive regard for the various consequences of his use.       Aysha Soni, ADC-T  "

## 2018-09-19 NOTE — PROGRESS NOTES
Patient Safety Plan Template    Name:   Glenn Simons YOB: 1985 Age:  33 MR Number:  [id number]   Step 1: Warning signs (Thoughts, images, mood, situation, behavior) that a crisis may be developin.  Stressed out   2.   Anxiety   3.   Withdrawing   Step 2: Internal coping strategies - Things I can do to take my mind off of my problems without contacting another person (relaxation technique, physical activity):     1.  Go for a run   2.   Go play around   3.   Listen to music   Step 3: People and social settings that provide distraction:     1. Name:  Mike MORAN Phone:   [phone]   2. Name:  Patient did not answer Phone:   Patient did not answer   3. Place:   Gym 4. Place:   [employer]     The one thing that is most important to me and worth living for is:   family   Step 4: People whom I can ask for help:     1. Name:   Mom and Dad Phone:   Patient did not answer   2. Name:   Nya Phone:   Patient did not answer   3. Name:   Horace Phone:   Patient did not answer   Step 5: Professionals or agencies I can contact during a crisis:     1. Clinician Name:   [facility] Phone:   [phone]   Clinician Pager or Emergency Contact #:  Patient did not answer   2. Clinician Name:   Dr. Johnson Phone:   Patient did not answer   Clinician Pager or Emergency Contact #:   Isabelle Acosta   3. Local Urgent Care Services:   Patient did not answer  Urgent Care Services Address:   Patient did not answer  Urgent Care Services Phone:   Patient did not answer   4. Suicide [facility] Phone: [phone] ([phone])     Step 6: Making the environment safe:     1.   Removing Alcohol from apartment   2.   Staying on my diet   Safety Plan Template 2008 Yessenia Lynch is reprinted with the express permission of the authors.  No portion of the Safety Plan Template may be reproduced without the express, written permission.  You can contact the authors at [email or web address] or  [email or web address].

## 2018-09-20 ENCOUNTER — HOSPITAL ENCOUNTER (OUTPATIENT)
Dept: BEHAVIORAL HEALTH | Facility: CLINIC | Age: 33
End: 2018-09-20
Attending: FAMILY MEDICINE
Payer: COMMERCIAL

## 2018-09-20 PROCEDURE — 10020000 ZZH LODGING PLUS FACILITY CHARGE ADULT

## 2018-09-20 PROCEDURE — H2035 A/D TX PROGRAM, PER HOUR: HCPCS | Mod: HQ

## 2018-09-20 PROCEDURE — H2035 A/D TX PROGRAM, PER HOUR: HCPCS

## 2018-09-20 ASSESSMENT — PATIENT HEALTH QUESTIONNAIRE - PHQ9: SUM OF ALL RESPONSES TO PHQ QUESTIONS 1-9: 18

## 2018-09-20 ASSESSMENT — ANXIETY QUESTIONNAIRES: GAD7 TOTAL SCORE: 11

## 2018-09-21 ENCOUNTER — HOSPITAL ENCOUNTER (OUTPATIENT)
Dept: BEHAVIORAL HEALTH | Facility: CLINIC | Age: 33
End: 2018-09-21
Attending: FAMILY MEDICINE
Payer: COMMERCIAL

## 2018-09-21 PROCEDURE — H2035 A/D TX PROGRAM, PER HOUR: HCPCS | Mod: HQ

## 2018-09-21 PROCEDURE — H2035 A/D TX PROGRAM, PER HOUR: HCPCS

## 2018-09-21 PROCEDURE — 10020000 ZZH LODGING PLUS FACILITY CHARGE ADULT

## 2018-09-22 ENCOUNTER — HOSPITAL ENCOUNTER (OUTPATIENT)
Dept: BEHAVIORAL HEALTH | Facility: CLINIC | Age: 33
End: 2018-09-22
Attending: FAMILY MEDICINE
Payer: COMMERCIAL

## 2018-09-22 PROCEDURE — H2035 A/D TX PROGRAM, PER HOUR: HCPCS

## 2018-09-22 PROCEDURE — 10020000 ZZH LODGING PLUS FACILITY CHARGE ADULT

## 2018-09-23 ENCOUNTER — HOSPITAL ENCOUNTER (OUTPATIENT)
Dept: BEHAVIORAL HEALTH | Facility: CLINIC | Age: 33
End: 2018-09-23
Attending: FAMILY MEDICINE
Payer: COMMERCIAL

## 2018-09-23 PROCEDURE — H2035 A/D TX PROGRAM, PER HOUR: HCPCS | Mod: HQ

## 2018-09-23 PROCEDURE — 10020000 ZZH LODGING PLUS FACILITY CHARGE ADULT

## 2018-09-24 ENCOUNTER — HOSPITAL ENCOUNTER (OUTPATIENT)
Dept: BEHAVIORAL HEALTH | Facility: CLINIC | Age: 33
End: 2018-09-24
Attending: FAMILY MEDICINE
Payer: COMMERCIAL

## 2018-09-24 PROCEDURE — H2035 A/D TX PROGRAM, PER HOUR: HCPCS

## 2018-09-24 PROCEDURE — H2035 A/D TX PROGRAM, PER HOUR: HCPCS | Mod: HQ

## 2018-09-24 PROCEDURE — 10020000 ZZH LODGING PLUS FACILITY CHARGE ADULT

## 2018-09-24 NOTE — PROGRESS NOTES
"INDIVIDUAL SESSION SUMMARY    D) Met with client on 9/24/18 from 12:30-1:30. Client reported a mental health diagnosis of: depression and some symptoms of anxiety. Therapist reminded client of the anxiety research studies and client stated that he may fill out the paperwork.  Client reported some intermittient prior therapy experience. Client reported he has been  for 11 years and has known his wife since their 6th grade [employer].  Client reported to have no children. Client spoke of employment including: being fired from his job in April after working there 3 years and that he has been drinking \"day and night\" since then. Client cried as he spoke about these last 4 months drinking and \"sitting on the couch\". Client reported to have some suicidal thoughts prior to entering treatment and stated that he is \"safe today\" with no current plan to harm himself. Client reported that his previous attempt in college was to go off his gluten free diet; that he got physically ill and his drinking significantly increased. Client stated that he was hesitant to bring up his suicidal thoughts in group and how they are related to his celiac disease. Client reported some issues with sleep especially since he is used to working the night shift. Client identified resources including: his wife, a brother 1 year younger and his parents. Client reported self-care activities including: walking and doing his \"dot to dot\" books. Client spoke about childhood including: he he was \"different\" and bullied by other kids because of his celiac disease; that he tended to be a \"loner\" in school\" and was attracted to his wife because she appeared to be a loner too. Client spoke of relationship history including: dating a few girls in high school and marrying his wife at approx age 22. 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 Client spoke " "about wanting to resume volunteer work with [facility] and get involved in his [employer] again. Client signed ROIs to invite his wife and parents to the family program for the week of 10/1 or 10/8.     I) Individual session with client. Provided client with verbal interventions including: validation, nurturing, compassion and support. Discussed the importance of being honest if the client's suicidal thoughts were to change from \"no plan\" to a \"plan\". Therapist encouraged client to share his feelings in group. Therapist encouraged client to continue with his self-care activities.     A) Client appears to struggle with low self-esteem and feeling different from his peer group. Client has a tendency to isolate and hide his feelings until he feels safe. Client appears emotionally constricted and to lack skills for emotional regulation and stress management. Client appears to lack a sober support network outside of his wife whom does not drink. Client appears to lack a daily routine, meaningful activities, and a sense of purpose since losing his job.     P) Next session is scheduled for 10/1/18. As a preliminary treatment goal, client will develop more effective coping skills to manage depressive symptoms. The focus of initial interventions will be to increase trust and self-esteem.     Marcie Jo, LMFT  9/24/2018    "

## 2018-09-25 ENCOUNTER — TELEPHONE (OUTPATIENT)
Dept: BEHAVIORAL HEALTH | Facility: CLINIC | Age: 33
End: 2018-09-25

## 2018-09-25 ENCOUNTER — HOSPITAL ENCOUNTER (OUTPATIENT)
Dept: BEHAVIORAL HEALTH | Facility: CLINIC | Age: 33
End: 2018-09-25
Attending: FAMILY MEDICINE
Payer: COMMERCIAL

## 2018-09-25 DIAGNOSIS — F32.A DEPRESSION: Primary | ICD-10-CM

## 2018-09-25 PROCEDURE — 10020000 ZZH LODGING PLUS FACILITY CHARGE ADULT

## 2018-09-25 PROCEDURE — H2035 A/D TX PROGRAM, PER HOUR: HCPCS | Mod: HQ

## 2018-09-25 PROCEDURE — H2035 A/D TX PROGRAM, PER HOUR: HCPCS

## 2018-09-26 ENCOUNTER — HOSPITAL ENCOUNTER (OUTPATIENT)
Dept: BEHAVIORAL HEALTH | Facility: CLINIC | Age: 33
End: 2018-09-26
Attending: FAMILY MEDICINE
Payer: COMMERCIAL

## 2018-09-26 PROCEDURE — 10020000 ZZH LODGING PLUS FACILITY CHARGE ADULT

## 2018-09-26 PROCEDURE — H2035 A/D TX PROGRAM, PER HOUR: HCPCS | Mod: HQ

## 2018-09-26 PROCEDURE — H2035 A/D TX PROGRAM, PER HOUR: HCPCS

## 2018-09-26 PROCEDURE — 99214 OFFICE O/P EST MOD 30 MIN: CPT | Performed by: PSYCHIATRY & NEUROLOGY

## 2018-09-26 RX ORDER — MIRTAZAPINE 15 MG/1
15 TABLET, FILM COATED ORAL AT BEDTIME
Status: DISCONTINUED | OUTPATIENT
Start: 2018-09-26 | End: 2018-09-27 | Stop reason: HOSPADM

## 2018-09-26 NOTE — CONSULTS
"[facility], [place]  Psychiatry Consultaion      Patient name: Glenn Simons    MRN: [id number]    Age: 33 year old    YOB: 1985    Identifying information:   The patient is a 32 year old   male who is currently unemployed and resides at home with his wife.  He is currently admitted to the [facility] chemical addiction treatment program.    Reason for consult: Evaluate depressive symptoms noting recent report of suicidal ideation.    History of present illness: The patient has a history of mood and anxiety symptoms in the setting of alcohol dependence and celiac disease.  He was recently admitted to the hospital for medical stabilization in the midst of needing detox from alcohol.  During his hospital course, psychiatry consultation was obtained at which time I was able to meet with the patient with subsequent evaluation by Dr. Sams.  He was started on Lexapro to address mood and anxiety symptoms.  As he achieved medical stabilization, he was transitioned to the [facility] program for residential chemical addiction treatment to address alcohol dependency.  During his treatment course and [facility], he had verbalized experiencing moments of suicidal ideation.  Psychiatry consultation was requested to further evaluate this concerning issue along with further evaluating his mood symptoms to help optimize his plan of care.  On meeting with the patient today, he tells me that since starting Lexapro approximately a week and a half ago, he has noticed slight improvement in his mood.  He initially began to experience mild nausea which is now minimal.  Residual depressive symptoms and anxiety are more prominent in the evening as he reflects on the negative consequences of his addiction along with feeling lonely away from his wife.  During those moments, he experiences fleeting suicidal thoughts further elaborated as \"I just think to myself I could go eat " "some gluten and put myself in a really bad situation.\"  He assures me that he has no actual intent or desire to follow through with that idea.  He went on to discuss how optimistic he has been feeling regarding his plan for sobriety further bringing attention to improve relationships with various family members.  He has had positive visits from his wife, aunt, and grandmother all of who are supportive of his sobriety and pursuit of treatment.  He appeared future oriented as he discussed his desire to address underlying issues which have been present over the past many years and giving rise to prominent mood and anxiety symptoms for which she has gravitated towards alcohol as a means of self-medicating the symptoms.  He is open to any additional treatment interventions to help reduce the stress from the symptoms.    Psychiatric Review of Systems:    -- Depressive episode: Mild depressed mood endorsed, characterized as a 3 out of 10 on a scale of 0-10 with 10 being most severe.  He denied active suicidal thoughts.  No homicidal thoughts reported.  Neurovegetative symptoms are minimal.  He denied feeling helpless or hopeless.  Sleep has been poor and he typically has difficulty with sleep onset and sleep maintenance.  Anhedonia is minimal.  -- Cristine:  denies symptoms  -- Psychosis:  denies symptoms  -- Anxiety: denies symptoms corresponding to DAIANA or panic disorder.  He describes anxiety more prominent in social settings.  Current distress from anxiety was described as moderate and at baseline.  -- PTSD: denies symptoms  -- OCD: denies  symptoms  -- Eating disorder: denies symptoms    Psychiatric History:    No other prior mental health treatment other than what was mentioned above.  No history of suicide attempts.  No history of self-injurious behavior.  No history of inpatient mental health treatment.    Substance Use History:    Drug of choice is alcohol with pattern of usage corresponding to dependence further " reporting progressive use, loss of control over use, drinking to the point of intoxication, loss of employment and strained relationships secondary to his usage, loss of general functionality due to ongoing use.  He denied any illicit substance usage.  No history of withdrawal seizures.  No history of DTs.  He has attended outpatient treatment in the past and did fairly well however relapse shortly after completion of the program.    Medical History:  Refer to the internal medicine notes which I reviewed.  Hypertension and celiac disease are noted.      Current Outpatient Prescriptions:      amLODIPine (NORVASC) 5 MG tablet, Take 1 tablet (5 mg) by mouth daily, Disp: 30 tablet, Rfl: 0     escitalopram (LEXAPRO) 10 MG tablet, Take 1 tablet (10 mg) by mouth daily, Disp: 30 tablet, Rfl: 1     hydrOXYzine (ATARAX) 25 MG tablet, Take 1 tablet (25 mg) by mouth every 6 hours as needed for anxiety or other (adjuvant pain), Disp: 15 tablet, Rfl: 0     multivitamin, therapeutic with minerals (THERA-VIT-M) TABS tablet, Take 1 tablet by mouth daily, Disp: 30 each, Rfl: 0     nicotine (NICODERM CQ) 14 MG/24HR 24 hr patch, Place 1 patch onto the skin every 24 hours, Disp: 30 patch, Rfl: 1     thiamine 100 MG tablet, Take 1 tablet (100 mg) by mouth daily, Disp: 30 tablet, Rfl: 0  No current facility-administered medications for this encounter.     Facility-Administered Medications Ordered in Other Encounters:      Self Administer Medications: Behavioral Services, , Does not apply, See Admin Instructions, Carlitos Mccullough MD     Social History:  Refer to the psychosocial assessment completed by the .     Family History:    The patient denied a family history of mental illnesses or suicide attempts    Medical review of systems: 10 systems were reviewed and positive for psychiatric symptoms as noted above otherwise negative    Vital Signs:    B/P: Data Unavailable, T: Data Unavailable, P: Data Unavailable, R: Data  "Unavailable  Estimated body mass index is 20.09 kg/(m^2) as calculated from the following:    Height as of 9/11/18: 5' 10\" (1.778 m).    Weight as of 9/11/18: 140 lb (63.5 kg).       Mental status examination:  Appearance:  Alert, fair hygiene, no acute distress  Attitude:  Attempts to be cooperative  Eye Contact: Fair  Mood: Anxious  Affect: Mood congruent; good range displayed.  There are moments where he smiled and laughed in the context of conversation.  Speech:  Normal rates, tone, latency, volume. Not pushed or pressured.  Psychomotor Behavior:  No psychomotor abnormalities noted  Thought Process: Linear and logical; not tangential or circumstantial or disorganized  Associations:  Logical; no loose associations Noted  Thought Content:  No obvious paranoia, delusions, ideas of reference, or grandiosity noted. Denies auditory or visual hallucinations. Denies suicidal Ideations. Denies homicidal ideations.  Insight:  Fair  Judgment:  Fair  Oriented to:  time, person, and place  Attention Span and Concentration:  Intact  Recent and Remote Memory: Intact based on interviewing and details provided  Language: Appropriate based on interviewing  Fund of Knowledge: Appropriate based on interviewing  Muscle Strength and Tone: Normal upon visual inspection  Gait and Station: Normal upon visual inspection            Diagnoses:    Unspecified depressive disorder (adjustment disorder versus complicated bereavement)  Social anxiety disorder  Alcohol use disorder, severe  Hypertension  Celiac disease     Recommendations:  1.  Continue Lexapro at the current dose of 10 mg daily.  We discussed expected time to achieve a full therapeutic response along with the option of increasing the dose in 2-3 weeks if benefits begin to plateau.  The patient reports gaining some improvement over the past week since the medication was initiated.  He experienced some nausea initially which may become problematic if the dose is increased to " soon.  For now, we will continue the current dose and monitor response and tolerability.    2.  Add mirtazapine for additional augmentation to further address mood and anxiety symptoms.  He may also gain additional benefits at minimizing GI distress and nausea while reducing insomnia.  Risks and benefits of the medication were reviewed and the patient consented to treatment.  The dose will be introduced at 15 mg at bedtime.  I anticipate increasing the dose to 30 mg at bedtime if tolerated well after 1 week of compliance.    3.  I will plan to follow-up with the patient in 1 week to assess response to treatment and determine if the dose of mirtazapine should further be optimized.    Please reconsult sooner if needed.

## 2018-09-26 NOTE — PROGRESS NOTES
"Patient:  Glenn Simons            Adult CD Progress Note and Treatment Plan Review     Attendance  Please refer to OP BEH CD Adult Attendance Record Documentation Flowsheet    Support group attended this week: yes    Reporting sobriety:  yes    Treatment Plan     Treatment Plan Review competed on: 9/26/18       Client preferred learning style: Hands on  Demonstration    Staff Members contributing [facility] HospitalMAYELA & ROSEMARY Mcgill                     Received Supervision: no    Client: contributed to goals and plan.    Client received copy of plan/revised plan: Yes    Client agrees with plan/revised plan: Yes    Changes to Treatment Plan: No    New Goals added since last review none    Goals worked on since last review Gain insight into use patterns. Strengthen motivation for recovery.    Strategies effective: yes    Strategies need these changes: none    ASAM Risk Rating:    Dimension 1 0 No indications of intoxication or withrawal. Patient admitted to program direct from Detox Unit. He reports last use as 9/11/18.    Dimension 2 0 No physical complaints or medical conditions presented by patient which would interfere with program participation. He is attending all scheduled activities.    Dimension 3 2 Patient attended a scheduled appointment with a hospital psychiatist for a mental health exam 9/26/18.    Patient participated in a suicide risk screening at time of assessment and again at admission. He was evaluated as low risk.   A Patient Safety Plan was completed with primary counselor the first week of programming. Pt continues to deny any suicidal or self harm ideation to counselor. Pt will be monitored for risk throughout tx. Patient is becoming more aware of self defeating thoughts, attitudes and behaviors. He completed and presented in group the assignment \"Positive Traits\".    Dimension 4 1 Patient presents as more engaged and motivated for change. He is gaining insight into his use " "patterns and related emotional consiequences. Patient completed and presented his \"Drug Use History\" assignment. He present his \"Patient Journal\" in each morning group session.    Dimension 5 4 Patient has limited insight into his personal relapse cues and effective prevention strategies. He is scheduled to attend the Relapse prevention workshop on 9/29.      Dimension 6 3 Patient states that relationships with family has been damaged by his use and related behaviors. Family program is scheduled for the week of 10/8. Patient is developing skills to building a sober support network. He is attending  AA/NA support groups almost every night. Patient has met with program  to discuss aftercare outpatient treatment options.    Any changes in Vulnerable Adult Status?  No  If yes, add to treatment plan and individual abuse prevention plan.    Family Involvement:   none schedule this week    Data:   offered feedback   good insight   client did actively participate      Intervention:   Aftercare planning  Behavior modification  Cognitive Behavioral Therapy  Counselor feedback  Education  Emotional management  Group feedback  Motivational Enhancement Therapy  Relapse prevention  Twelve Step facilitation  Mental health education      Assessment:   Stages of Change Model  Precontemplation    Appears/Sounds:  Cooperative  Motivated  Engaged      Plan:  Focus on recovery environment  Monitor emotional/physical health      Irineo Durant, ROSEMARY        "

## 2018-09-27 ENCOUNTER — HOSPITAL ENCOUNTER (OUTPATIENT)
Dept: BEHAVIORAL HEALTH | Facility: CLINIC | Age: 33
End: 2018-09-27
Attending: FAMILY MEDICINE
Payer: COMMERCIAL

## 2018-09-27 PROCEDURE — 10020000 ZZH LODGING PLUS FACILITY CHARGE ADULT

## 2018-09-27 PROCEDURE — H2035 A/D TX PROGRAM, PER HOUR: HCPCS

## 2018-09-27 PROCEDURE — H2035 A/D TX PROGRAM, PER HOUR: HCPCS | Mod: HQ

## 2018-09-27 NOTE — PROGRESS NOTES
AFTERCARE NOTE  9/27/18    Writer met with patient on 9/26/18 to discuss his aftercare plans. Pt stated he has a safe living environment to return to after completing [facility]. Pt stated he is currently interested in doing out-patient at [facility]. Pt was given the [facility] out-patient schedule and asked to review it. Pt stated he would do this. Updates to follow.    Amadou Pascual/ [facility]   [facility]

## 2018-09-28 ENCOUNTER — HOSPITAL ENCOUNTER (OUTPATIENT)
Dept: BEHAVIORAL HEALTH | Facility: CLINIC | Age: 33
Discharge: HOME | End: 2018-09-28
Attending: PSYCHIATRY & NEUROLOGY | Admitting: PSYCHIATRY & NEUROLOGY
Payer: COMMERCIAL

## 2018-09-28 ENCOUNTER — HOSPITAL ENCOUNTER (OUTPATIENT)
Dept: BEHAVIORAL HEALTH | Facility: CLINIC | Age: 33
End: 2018-09-28
Attending: FAMILY MEDICINE
Payer: COMMERCIAL

## 2018-09-28 PROCEDURE — 10020000 ZZH LODGING PLUS FACILITY CHARGE ADULT

## 2018-09-28 PROCEDURE — H2035 A/D TX PROGRAM, PER HOUR: HCPCS

## 2018-09-28 PROCEDURE — H2035 A/D TX PROGRAM, PER HOUR: HCPCS | Mod: HQ

## 2018-09-28 PROCEDURE — 90791 PSYCH DIAGNOSTIC EVALUATION: CPT | Performed by: PSYCHOLOGIST

## 2018-09-28 ASSESSMENT — PAIN SCALES - GENERAL: PAINLEVEL: NO PAIN (0)

## 2018-09-28 NOTE — PROGRESS NOTES
" Standard Diagnostic Assessment     CLIENT'S NAME: Glenn Simons  MRN:   [id number]  :   1985 AGE:33 year old SEX: male  ACCT. NUMBER: [id number]  DATE OF SERVICE: 18 Start Time:  1245 End Time:  1445      Home Phone [phone]   Work Phone Not on file.   Mobile [phone]     Preferred Phone: [phone]  May we leave a program related message? yes    Yes, the patient has been informed that any other mental health professional providing mental health services to me will need access to this Diagnostic Assessment in order to develop a treatment plan and receive payment.     Identifying Information:  Glenn Simons is a 33 year old, White,  male. Glenn attended the DA  alone.     Reason for Referral: Glenn was referred to MI/CD Treatment (MICD)  by [facility]. Glenn reports he was referred due to alcohol use, anxiety, and depression.     Glenn verbalizes the following treatment/discharge goals: \"healthy regular routine, part-time evening job, community activities.\"     Current Stressors/Losses/Disappointments: Pt reports he is unemployed currently (lost job  due to alcohol use) causing financial stress. Pt reports since he has been 17 he has been employed and not having a job is \"different.\"     Per Client, Review of Symptoms:  Mood (Depression/Anxiety/Cristine/Anger): Pt reports his mood is up and down, feels anxious, feels helpless, worthless, anhedonic, nervous, and trembles.      Thoughts: Pt reports he has had S/I in the past with his last thought within the past two weeks. Pt denies H/I.   Concentration/Memory:  Pt reports his concentration is good and memory is normal.   Appetite/Weight: (see also, Physical Health Screening below) Pt reports his appetite is increased.    Sleep: Pt reports his sleep is broken and has insomnia.  Motivation/Energy: Pt reports energy and motivation are adequate.   Behavior: Pt reports crying easily.      Psychosis: no  Trauma:   Pt reports he is easily " "startled and has nightmares. Pt denies an abuse hx.   Other:     Mental Health History:  Glenn reports first onset of mental health symptoms: pt reports he is not sure. Pt denies any mental health treatment in his hx. Glenn was first diagnosed now.    Glenn received the following mental health services in the past: Pt reports IOP CD tx three years ago at [facility], and is currently in [facility] and graduates 10/16/18. .   Psychiatric Hospitalizations: None.   Glenn denies a history of civil commitment.      Onset/Duration/Pattern of Symptoms noted above:  Since when he lost his job April 2018.      Glenn reports the following understanding of his diagnosis: not sure      Personal Safety:    [place]-Suicide Severity Rating Scale   Suicide Ideation   1.) Have you ever wished you were dead or that you could go to sleep and not wake up?     Lifetime: Yes, (if yes, please discribe) : when he was in his early 20's he had thoughts and then in his late 20's Past Month:  Yes, (if yes, please discribe) : after losing his job 4/2018.    2.) Have you actually had any thoughts of killing yourself?   Lifetime:  Yes, (if yes, please discribe) : \"same as previous question\" Past Month:  Yes, (if yes, please discribe) : has had recent S/I.    3.) Have you been thinking about how you might do this?     Lifetime:  Yes, (if yes, please discribe) : Pt reports he has had thoughts to drink himself to death. He has had thoughts to eat things that would shut down his digestive tract due to Ciliac disease.  Past Month:  Yes, (if yes, please discribe) : drink self to death. Pt reports he has had thoughts to eat unhealthy things.    4.) Have you had these thoughts and had some intention of acting on them?     Lifetime:  Yes, (if yes, please discribe) : pt reports in his early 20's he had decided to eat things that would kill him.  Past Month:  Yes, (if yes, please discribe) : to eat unhealthy things or drinking self to death.    5.) Have you " started to work out the details of how to kill yourself?   Lifetime:  Yes, (if yes, please discribe) : pt has tried to eat and die.  Past Month:  Yes, (if yes, please discribe) : Pt reports he was not eating and drinking alcohol prior to his admission to [facility].    6.) Do you intend to carry out this plan?      Lifetime:  Yes, (if yes, please discribe) : he tried to eat and die and was recently drinking to eat himself to death.  Past Month:  Yes, (if yes, please discribe) : drinking to die recently.    Intensity of Ideation   Intensity of ideation (1 being least severe, 5 being most severe):     Lifetime:  4, description of Ideation: 3-4                                                                                                Past Month:  3, description of Ideation:    How often do you have these thoughts? 2-5 times in a week    When you have the thoughts how long do they last?  Less than 1 hours/some of the time   Can you stop thinking about killing yourself or wanting to die if you want to?  Can control thoughts with some difficulty   Are there things - anyone or anything (i.e. family, Orthodox, pain of death) that stopped you from wanting to die or acting on thoughts of suicide?  Protective factors definately stopped you from attempting suicide      What sort of reasons did you have for thinking about wanting to die or killing yourself (ie end pain, stop how you were feeling, get attention or reaction, revenge)?  Completely to end or stop the pain (youcouldn't go on living with the pain or how you were feeling)   Suicidal Behavior   (Suicide Attempt) - Have you made a suicide attempt?     Lifetime:  Yes, (if yes, please discribe) : eating to die in his 20's Past Month: Yes, (if yes, please discribe) : recently he was drinking to die.   Have you engaged in self-harm (non-suicidal self-injury)?  No   (Interrupted Attempt) - Has there been a time when you started to do something to end your life but someone  or something stopped you before you actually did anything?  No   (Aborted or Self-Interrupted Attempt) - Has there been a time when you started to do something to try to end your life but you stopped yourself before you actually did anything?  Yes, (if yes, total number of aborted or self interrupted) : the time when he was bingeing on food in his 20's   (Preparatory Acts of Behavior) - Have you taken any steps towards making suicide attempt or preparing to kill yourself (such as collecting pills, getting a gun, giving valuables away or writing a suicide note)? No   Actual Lethality/Medical Damage:   0. No physical damage or very minor physical damage (e.g., surface scratches).   1. Minor physical damage (e.g., lethargic speech; first-degree burns; mild bleeding; sprains).  2. Moderate physical damage; medical attention needed (e.g., conscious but sleepy, somewhat responsive; second-degree burns; bleeding of major vessel).  3. Moderately severe physical damage; medical hospitalization and likely intensive care required (e.g., comatose with reflexes intact; third-degree burns less than 20% of body; extensive blood loss but can recover; major fractures).  4. Sever physical damage; medical hospitalization with intensive care required (e.g., comatose without reflexes; third-degree burs over 20% of body; extensive blood loss with unstable vital sign; major damage to a vital area).  5. Death    Attempt Date / Enter Code: 20's / 1  Attempt Date / Enter Code: 9/18  / 3  Attempt Date / Enter Code:  / 2008  [employer]  Used with permission by Molly Soto, PhD.               Guide to C-SSRS Risk Ratings   NO IDEATION:  with no active thoughts IDEATION: with a wish to die. IDEATION: with active thoughts. Risk Ratings   If Yes No No 0 - Very Low Risk   If NA Yes No 1 - Low Risk   If NA Yes Yes 2 - Low/moderate risk   IDEATION: associated thoughts of methods without intent or plan  INTENT: Intent to follow through on suicide PLAN: Plan to follow through on suicide Risk Ratings cont...   If Yes No No 3 - Moderate Risk   If Yes Yes No 4 - High Risk   If Yes Yes Yes 5 - High Risk   The patient's ADDITIONAL RISK FACTORS and lack of PROTECTIVE FACTORS may increase their overall suicide risk ratings.      Additional Risk Factors:    Significant history of having untreated or poorly treated mental health symptoms     Significant history of physical illness or chronic medical problems     A recent loss that was significant to the patient, i.e. loss of job, loss of home, divorce, break-up, etc.   Protective Factors: Sense of responsibility to family       Risk Status   Risk Rating: 3-  DA Staff:  LUANNAR to Tx team.    Additional information to support suicide risk rating:  There was no additional information to provide at this time.  Please see the above suicide risk rating information.       Additional Safety Questions:    Do you have a gun, weapons or other means (including medications) to harm yourself available to you? No   Do you take chances with your safety?   no   Have you ever thought about killing someone else? No   Have you ever heard voices telling you to harm yourself or others? No       Supports:   From whom do you receive support and how often? (family/friends/agency) Wife, wife's grandparents   Do your support people want/need education/resources? yes        Is there anything in your life (current or history) that is satisfying to you (include leisure interests/hobbies)?   yes , Frisbee golf, walks, bike rides, jogging, paintball.       Hope/Belief System:  Do you believe things can get better? yes       Personal Safety Summary:          Glenn denies current fears or concerns for personal safety.    Completed safety coping plan? yes        Substance Use History:   MICD Addendum - Comprehensive Assessment     Detox: Glenn reports 1 lifetime detox admissions. Date of last detox was 4/2018 at  the following location: [facility]  for the following substances: Alcohol.    Treatment of Substance Use Disorder:   Glenn is currently receiving the following services: CD Treatment at [facility] currently since 9/18/18    Glenn has received chemical dependency treatment in the past at [place] 2015 IOP X 3 months. Pt reports he completed.     Addiction Pharmacology: Glenn denies use of addiction pharmacology.      Contraindicated Medication Use: Glenn denies current Rx/use of stimulant, benzodiazapine and/or narcotic medications.     Prioritization Status:   Glenn denies a history of substance use by injection.     Substances Use History:     Substances Used:       Age of First Use Last Use Date / Amount (if available)  Most Recent Pattern of Use & Duration    (both frequency & amounts)  Method of use:       Alcohol    yes     Age of 1st  Intoxication:    20  Date: 9/11/2018  Amount: unsure    # Days Used Past 30 Days:  13 Pt reports he was drinking daily for many years. Pt reports he drank a 1.75 liter of maude every 2-3 days. Pt reports he started daily drinking in his mid-20's.       Oral   Cocaine Powder/  Crack-Cocaine      no         Cannabis/Marijuana/  Synthetic/Hashish       yes          Late teens  Date: early 20's  Amount:     # Days Used Past 30 Days:  0    Pt reports he has tried it.   Smoke   Heroin    no         Non-Prescription Methadone    no         Other Opiates/Synthetics     no         PCP/  Other Hallucinogens    no         Meth/Amphetamine  Other Stimulants (Ecstasy/Other Club Drugs)    no         Benzodiazapines    no         Ketamine/  Other Tranquilizers    no         Barbiturates/  Sedatives/  Hypnotics    no         Inhalants    no         Over-the-Counter Drugs    no         Other    no         Nicotine/Tobacco    yes          16 Date: 9/10/18  Amount:     # Days Used Past 30 Days:  12    pt reports he smoked until his admission.   Smoke     Current/Hx of withdrawal  symptoms:   Sweating (rapid pulse)  Shaky/jittery    Current Risk of withdrawal (if yes, identify substance):  no    Client Impressions:   Glenn identifies his primary substance as: Alcohol.      Impact:  Glenn reports the following life areas have been negatively impacted by his substance use:  DUI, financial problems and occupational / vocational problems.     Glenn reports his substance use has been a problem and has been out of control.    Glenn associates his substance use with the following leisure activities: go to a Wild game.      Glenn attributes his relapses/continued use to: stress, worry     Glenn reports his substance use and mental health have influenced each other in the following way(s): stress and anxiety leads to use.     Concern/Support:  Glenn identifies the following people who have been concerned about his substance use and/or have been supportive of his recovery in the past 30 days: Wife, parents, brother.     Glenn reports a history of trying to hide his substance use from others.       Glenn does agree to have  contact collateral resources to obtain information.  Release of Information has been obtained for wife.    Recovery Goals:  Glenn reports a goal to be abstinent.     Glenn reports the following period(s) of abstinence: Lifetime:  3 mos in 2015.,  In past 6 months:  since 9/11/18.     Glenn identifies the following coping skills/strategies to prevent relapse of substance use or mental health instability: AA meetings.      Glenn reports attending voluntary self-helps support groups.  has been to a few while in treatment. Has attended in the past. in 2015 following tx and after detox 4/2018.  Glenn does not have a sponsor.     DSM-5 Criteria Substance Use Disorder Criteria: At least two criteria must be met within a twelve month period.    Impaired Control:    Yes  Alcohol   1. Substance is taken in larger amounts or over a longer period than was intended.   Yes  Alcohol   2. There is a  persistent desire or unsuccessful efforts to cut down or control use.   Yes  Alcohol   3. A great deal of time is spent in activities necessary to obtain substance, use substance, or recover from its effects.   Yes  Alcohol   4. Craving, or a strong desire or urge to use the substance.    Social Impairment:    Yes  Alcohol   5. Recurrent substance use resulting in failure to fulfill major role obligations at work, school or home.   Yes  Alcohol  6. Continued substance use despite having persistent or recurrent social or interpersonal problems caused or exacerbated by the effects of the substance.   Yes  Alcohol  7.Important social, occupational, or recreational activities are given up or reduced because of the substance use.      Risky Use:   Yes  Alcohol  8. Recurrent substance use in situations in which it is physically hazardous.   Yes  Alcohol  9. Substance use is continued despite knowledge of having a persistent or recurrent physical or psychological problem that is likely to have been caused or exacerbated by the substance.     Pharmacological:  Yes  Alcohol  10. Tolerance, as defined by either of the following:   a. A need for markedly increased amounts of the substance to achieve intoxication or  desired effect.   b. A markedly diminished effect with continued use of the same amount of the substance.  Yes  Alcohol  11. Withdrawal, as manifested by either of the following:     a. The characteristic withdrawal syndrome for the substance.   b. Substance (or a closely related substance) is taken to relieve or avoid withdrawal symptoms.     Mild: Presence of 2-3 symptoms  Moderate: Presence of 4-5 symptoms  Severe: Presence of 6 or more symptoms.    Specify if :  In early remission - no criteria met (except craving) for at least 3 months and less than 12 months  In sustained remission - no criteria met (except craving) for 12 months or longer    In a controlled environment - individual is in an environment where  "access to the substance is restricted.    Glenn met 11 of 11 criteria for a Alcohol use disorder, severe     Glenn does agree to follow treatment recommendations including abstinence and regular attendance.      Glenn reports motivation/primary condition leading to admission to treatment: self-motivation    Legal History:    Glenn reported that he has been involved with the legal system.   History of arrests, senior living or custodial include: DWI 2006.     Glenn reports current/history of having a 's license revoked because of an incident involving alcohol or other substances. License is: Revoked at least once in lifetime..    Glenn denies currently being under the jurisdiction of the court or on probation or parole.      Legal Status involving Children:   Glenn denies having children.  ________________________________________________________________________    Life Situation (Employment/School/Finances/Basic Needs):  Glenn  is currently living with wife in an apartment.   The safety/stability of this environment is described as: safe and stable.     Glenn is currently unemployed:   Glenn describes a work Hx of : [employer], [employer]    Glenn reports finances are obtained through none  Glenn does identify his finances as a current stressor.  Glenn denies a history of gambling and denies a history of gambling treatment.     Glenn reports his highest level of education is high school graduate and some college  Glenn did identify the following learning problems: reading dyslexia  Glenn describes academic performance as: had special ed services. His grades were B's and C's   Glenn describes school social experience as: : \"more of an outcast/loner\"     Glenn denies concerns regarding his current ability to meet basic needs.     Social/Family History:  Glenn  reports he grew up in [place], [place].   Glenn was the third born of 5 children.   Glenn reports his biological parents are     Glenn describes his childhood as : \"fairly good\"  Glenn " "describes his current relationships with his family of origin as : \"fairly good\"    Glenn identifies his relationship status as: .    Glenn identifies his sexual orientation as: opposite sex   Glenn denies sexual health concerns.     Glenn reports having 0 children.     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       Significant Losses / Trauma / Abuse / Neglect Issues / Developmental Incidents:  Glenn denies significant loss/trauma/abuse/neglect issues/developmental incidents     Glenn denies personal  experience.     Restoration Preference/Spiritual Beliefs/Cultural Considerations:     A. Ethnic Self-Identification:  Glenn self-identifies his race/ethnicities as:  and his preferred language to be English.   Glenn reports he does not need the assistance of an . Glenn  reports he does not need other support or modifications involved in therapy.      B. Do you experience cultural bias (the practice of interpreting judging behavior by standards inherent to one's own culture) by other people as a stressor? If yes, describe how this relates to overall mental health symptoms.  No    C. Are there any cultural influences that may need to be considered for your treatment?  (This includes historical, geographical and familial factors that affect assessment and intervention processes). No, Denies any cultural influences or concerns that need to be considered for treatment    Strengths/Vulnerabilities:   Glenn identifies his personal strengths as: ability to work independently.   Things that may interfere with the clients success in treatment include: few friends and financial hardship.   Other identified areas of vulnerability include: Suicidal Ideation  Anxiety with/without panic attacks  Active/history of addiction/substance abuse  Depressive symptoms.     Medical History / Physical Health Screen:     Primary Care Physician: " Glenn has a non-[facility] Primary Care Provider. Their PCP is Dr Elizabeth LEE @ [facility]..   Last Physical Exam: greater than a year ago and client was encouraged to schedule an exam with PCP.    Mental Health Medication Management Provider / Psychiatrist: Glenn reports not having a psychiatrist.     Last visit: NA        Next visit: NA    Current medications including prescription, non-prescription, herbals, dietary aids and vitamins:  Per client report:   Outpatient Prescriptions Marked as Taking for the 9/28/18 encounter (Hospital Encounter) with [facility]   Medication Sig     amLODIPine (NORVASC) 5 MG tablet Take 1 tablet (5 mg) by mouth daily     escitalopram (LEXAPRO) 10 MG tablet Take 1 tablet (10 mg) by mouth daily     hydrOXYzine (ATARAX) 25 MG tablet Take 1 tablet (25 mg) by mouth every 6 hours as needed for anxiety or other (adjuvant pain)     multivitamin, therapeutic with minerals (THERA-VIT-M) TABS tablet Take 1 tablet by mouth daily     nicotine (NICODERM CQ) 14 MG/24HR 24 hr patch Place 1 patch onto the skin every 24 hours     thiamine 100 MG tablet Take 1 tablet (100 mg) by mouth daily       Glenn reports current medications are: just started meds.   Glenn describes taking his medications as: Independent.  Glenn reports taking prescribed medications as prescribed. Has been inpatient/residential    Glenn provides the following current assessment of pain:  ; Pain Score: No Pain (0) (no pain);  .     Glenn provides the following information regarding past significant medical conditions/diagnoses:      Medical:  Past Medical History:   Diagnosis Date     ACD (adult celiac disease)      Celiac disease        Surgical:  History reviewed. No pertinent surgical history.  Allergy:   Glenn reports   Allergies   Allergen Reactions     Amoxicillin Unknown     Gluten Meal      Tylenol W/Codeine [Acetaminophen-Codeine] Unknown     Tylenol [Acetaminophen] Unknown     Wheat Bran GI Disturbance         Family History of Medical, Mental Health and/or Substance Use problems:  Per client report: History reviewed. No pertinent family history.    Glenn reports the following current medical concerns: liver enzymes are elevated and B/P was elevated. He has an appt on Tuesday 10/2/18..      General Health:   Have you had any exposure to any communicable disease in the past 2-3 weeks? no     Are you aware of safe sex practices? yes     Is there a possibility of pregnancy?  no       Nutrition:    Are you on a special diet? If yes, please explain:  yes due to Celiac disease.    Do you have any concerns regarding your nutritional status? If yes, please explain:  no   Have you had any appetite changes in the last 3 months?  No     Have you had any weight loss or weight gain in the last 3 months?  No     Do you have a history of an eating disorder? no   Do you have a history of being in an eating disorder program? no   NOTE: BMI to be calculated following program admission.    Fall Risk:   Have you had any falls in the past 3 months? no     Do you currently use any assistive devices for mobility?   no     NOTE: If client reports 3 or more falls in the past 3 months, the client will not be accepted into the program until further assessment is completed by the program nurse. Check if a nurse is available to assess at time of DA.    NOTE: If client reports 2 falls in the past 3 months and/or the client currently uses assistive devices for mobility, the  will send an in-basket to the program nurse to meet with the client within the first week of programming.    Head Injury/Trauma:   Do you have a history of head injury / trauma? no     Do you have any cognitive impairment? no       Per completion of the Medical History / Physical Health Screen, is there a recommendation to see / follow up with a primary care physician/clinic?    No.      Clinical Findings     Mental Status Assessment/Clinical  Observation:  Appearance:   awake, alert  Eye Contact:   good  Psychomotor Behavior: Normal    Attitude:   Cooperative    Oriented to:   All    Speech   Rate / Production: Normal    Volume:  Normal   Mood:    Anxious     Affect:    Constricted      Thought Content:  Clear  no evidence of suicidal ideation or homicidal ideation  Thought Form:  logical no loose associations  Insight:    fair    Judgment:     intact  Attention Span/Concentration: intact  Recent and Remote Memory:  intact      Psychiatric Diagnosis:    296.32 (F33.1) Major Depressive Disorder, Recurrent Episode, Moderate _  300.02 (F41.1) Generalized Anxiety Disorder  Substance-Related & Addictive Disorders Alcohol Use Disorder   303.90 (F10.20) Severe .    Provisional Diagnostic Hypothesis (Explain R/O, other Provisional Diagnosis, and why alternative Diagnosis that were considered were ruled out):   No other dx considered.     Medical Concerns that may Impact Treatment:   Celiac Disease: Pt needs to monitor diet.     Psychosocial and Contextual Factors (V-Codes):  V62.29 Other problem related to employment unemployed and V60.9 Unspecified housing or economic problem no income    WHODAS 2.0 SCORE: 33/94.9 %    Client and family participation in assessment:   Glenn was alone during this assessment.   This assessment does not include collateral information.      Summary & Recommendations  Provide a brief summary of how diagnostic criteria is met (symptoms, duration & functional impairment), cause, prognosis, and likely consequences of symptoms. Include overview of pertinent client strengths, cultural influences, life situations, relationships, health concerns and how diagnosis interacts/impacts with client's life. Recommendations include: client preferences, prioritization of needed mental health, ancillary or other services and any referrals to services required by statute or rule.     Glenn was referred to MI/CD Treatment (MICD)  by [facility]. Glenn  "reports he was referred due to alcohol use, anxiety, and depression. Pt reports he is unemployed currently (lost job 4/18 due to alcohol use) causing financial stress. Pt reports since he has been 17 he has been employed and not having a job is \"different.\"     Pt reports he has been drinking daily since his mid 20's and has had only one three month period of abstinence since then and now since he was admitted to the hospital 9/11/18. He then transferred to [facility] about 9/16/18. Pt reports he is motivated to abstain from use. Pt reports IOP CD tx three years ago at [place].     Pt reports current sx since he los his job 4/2018. Pt reports his mood is up and down, he feels anxious, feels helpless, worthless, anhedonic, nervous, and trembles. Pt reports his concentration is good and memory is normal. Pt reports his appetite is increased.  Pt reports his sleep is broken and has insomnia. Pt reports energy and motivation are adequate. Pt reports crying easily.  Pt reports he is easily startled and has nightmares ( Pt denies an abuse hx).     Pt reports he has had S/I in the past with his last thought within the past two weeks. He reports a hx of suicide attempts with the first in his 20's by trying to eat himself to death (pt has Celiac disease) and then the past month trying to drink himself to death. Pt did contract for safety.     Glenn reports first onset of mental health symptoms: pt reports he is not sure. He was a loner in high school he reports. Glenn was first diagnosed recently. Pt denies any mental health treatment in his hx.     Pt reports his strength as: ability to work independently.     Pt denies cutural issues.     Pt reports he has Celiac disease and is on a special diet for that.     Prognosis is Fair. Without the recommended intervention, the client is likely to experience the following consequences of their symptoms: Pt will need inpatient mental health tx without MICD IOP.    Referrals to " services required by statute or rule:   Report to child/adult protection services was NA.     Program Recommendation: MI/CD Treatment (MICD) .      Assessment Completed by: Brock Lopez MA [facility]

## 2018-09-29 ENCOUNTER — HOSPITAL ENCOUNTER (OUTPATIENT)
Dept: BEHAVIORAL HEALTH | Facility: CLINIC | Age: 33
End: 2018-09-29
Attending: FAMILY MEDICINE
Payer: COMMERCIAL

## 2018-09-29 PROCEDURE — H2035 A/D TX PROGRAM, PER HOUR: HCPCS

## 2018-09-29 PROCEDURE — 10020000 ZZH LODGING PLUS FACILITY CHARGE ADULT

## 2018-09-30 ENCOUNTER — HOSPITAL ENCOUNTER (OUTPATIENT)
Dept: BEHAVIORAL HEALTH | Facility: CLINIC | Age: 33
End: 2018-09-30
Attending: FAMILY MEDICINE
Payer: COMMERCIAL

## 2018-09-30 PROCEDURE — 10020000 ZZH LODGING PLUS FACILITY CHARGE ADULT

## 2018-09-30 PROCEDURE — H2035 A/D TX PROGRAM, PER HOUR: HCPCS | Mod: HQ

## 2018-10-01 ENCOUNTER — HOSPITAL ENCOUNTER (OUTPATIENT)
Dept: BEHAVIORAL HEALTH | Facility: CLINIC | Age: 33
End: 2018-10-01
Attending: FAMILY MEDICINE
Payer: COMMERCIAL

## 2018-10-01 PROCEDURE — H2035 A/D TX PROGRAM, PER HOUR: HCPCS

## 2018-10-01 PROCEDURE — H2035 A/D TX PROGRAM, PER HOUR: HCPCS | Mod: HQ

## 2018-10-01 PROCEDURE — 10020000 ZZH LODGING PLUS FACILITY CHARGE ADULT

## 2018-10-01 NOTE — PROGRESS NOTES
INDIVIDUAL SESSION SUMMARY    D) Met with client on 10/1/18 from 12:30-1:20. Client stated that his family will have difficulty coming to the family program next week because of work and school obligations. Client stated that his wife attends Al-Anon already and that she has an Al-Anon sponsor. For self-care, client reported starting a new exercise routine in treatment and has been journaling. Client reported that he started a new anti-depressant, mirtazapine, and that since then he's sleeping better and he feels he has more energy. Client stated that he has an appointment with his PCP at [facility] tomorrow and that he will ask for therapist referrals within [facility] network and also see if his doctor will continue his new medications or ask for a psychiatry referral. Client spoke about his goal to find PT work and attend out-patient programming and get more involved in on-campus activities. Client stated that he will resume attending celiac support group meetings along with AA. Client stated he will let this therapist know if he needs additional therapy or psychiatry referrals.       I) Individual session with client. Therapist encouraged client to continue with is self care activities including journaling, drawing, exercise, and AA attendance. Therapist spoke about the evening family program.     A) Client appears to have a bit more energy and spoke about plans to connect with peers in the community for support. Client has a tendency to isolate and hide his feelings until he feels safe. Client appears emotionally constricted and to lack skills for emotional regulation and stress management and would benefit from continuing to work with an individual therapist. Client appears to lack a daily routine, meaningful activities, and a sense of purpose since losing his job.     P) Next session is scheduled for 10/8/18. The client is asking his PCP at [facility] for therapist referrals.   Marcie Jo,  LMFT  10/1/2018

## 2018-10-02 ENCOUNTER — TRANSFERRED RECORDS (OUTPATIENT)
Dept: HEALTH INFORMATION MANAGEMENT | Facility: CLINIC | Age: 33
End: 2018-10-02

## 2018-10-02 ENCOUNTER — HOSPITAL ENCOUNTER (OUTPATIENT)
Dept: BEHAVIORAL HEALTH | Facility: CLINIC | Age: 33
End: 2018-10-02
Attending: FAMILY MEDICINE
Payer: COMMERCIAL

## 2018-10-02 PROCEDURE — H2035 A/D TX PROGRAM, PER HOUR: HCPCS | Mod: HQ

## 2018-10-02 PROCEDURE — 10020000 ZZH LODGING PLUS FACILITY CHARGE ADULT

## 2018-10-02 NOTE — PROGRESS NOTES
Patient:  Glenn Simons            Adult CD Progress Note and Treatment Plan Review     Attendance  Please refer to OP BEH CD Adult Attendance Record Documentation Flowsheet    Support group attended this week: yes    Reporting sobriety:  yes    Treatment Plan     Treatment Plan Review competed on: 10/2/2018       Client preferred learning style:   Hands on  Demonstration    Staff Members [facility] [place]; [facility] [place]; Aysha Soni [facility]                         Received Supervision: None    Client: contributed to goals and plan.    Client received copy of plan/revised plan: Yes    Client agrees with plan/revised plan: Yes    Changes to Treatment Plan: No    New Goals added since last review: No additional goals added at this time    Goals worked on since last review: Patient focused on identifying relapse triggers and warning signs, and worked to increase overall, positive self-esteem.     Strategies effective: yes    Strategies need these changes: none    1) Care Coordination Activities:  Patient met with staff  to review plans to attend outpatient at [facility]  2) Medical, Mental Health and other appointments the client attended: Patient attended individual therapy, patient met with staff psychologist for mental health follow up  3) Medication issues: No concerns at this time.  4) Physical and mental health problems: Patient reports a diagnosis of celiac disease.  5) Any changes in Vulnerable Adult Status?  No If yes, add to treatment plan and individual abuse prevention plan.  6) Review and evaluation of the individual abuse prevention plan: Current IAPP for this program is adequate for this client      ASAM Risk Rating:    Dimension 1, 0: Reports substance of use as Alcohol. Reports last date of use as 9/11/18. Patient denies any withdrawal symptoms that would interfere with full participation in treatment programming at this time.     Dimension 2, 0:  "Patient reports diagnosis of Celiac Disease. Patient reports condition is well-managed. Patient reports medication compliance. Patient appears able to access medical aid as needed. Patient attended staff RN lecture on 9/30/2018.    Dimension 3, 2: Patient has attended appointments with staff psychotherapist as scheduled. He has been strongly encouraged to arrange regular appointments with a therapist as part of his aftercare plan. Patient also met with staff psychiatrist for a mental health assessment.. Patient presents with significant improvement in overall mood and engagement. Patient is currently working to increase overall positive self-esteem. He completed and presented the assignment \"Positive Traits\". Patient was given a suicidal risk screening. Patient was rated as \"low risk\". Patient denies any suicidal thoughts or ideations at this time.     Dimension 4, 1: Patient appears to hold an increase in overall engagement and participation in group processing and feedback. Patient reports that he is highly motivated for recovery at this time.     Dimension 5, 4: Patient appears at high risk for relapse at this time due to limited long-term sobriety. Patient completed and presented the assignment titled \"Own reasons to Quit\". Patient focused on identifying values, people, and influences that will aid him in reducing his overall relapse potential by focusing on the positives of sobriety. Patient attended a Relapse Prevention workshop on 9/29/2018. Patient focused on identifying individual relapse triggers and warning signs.     Dimension 6, 3: Patient has been dedicated to developing a sober support network. He has been actively participating in support group meetings both mandatory and non. Patient attended scheduled interview/assessment with counselor from  [facility] outpatient program offered in the [facility] on 10/5. Patient is weighing options both with that program and the [facility].  Patient is scheduled " to attend Family Week Programming on 10/8/2018.     Any changes in Vulnerable Adult Status?  No  If yes, add to treatment plan and individual abuse prevention plan.    Guide to Risk Ratings for Suicidality:   IDEATION: Active thoughts of suicide? INTENT: Intent to follow on suicide? PLAN: Plan to follow through on suicide? Level of Risk:   IF Yes Yes Yes Patient = High Emergent   IF Yes Yes No Patient = High Urgent/Non-Emergent   IF Yes No No Patient = Moderate Non-Urgent   IF No No   No Patient = Low Risk   The patient's ADDITIONAL RISK FACTORS and lack of PROTECTIVE FACTORS may increase their overall suicide risk ratings.     Patient's/client's current risk rating:  Low Risk    Family Involvement:   none schedule this week    Data:   offered feedback   good insight   client did actively participate      Intervention:   Aftercare planning  Behavior modification  Cognitive Behavioral Therapy  Counselor feedback  Education  Emotional management  Group feedback  Relapse prevention  Twelve Step facilitation  Mental health education      Assessment:   Stages of Change Model  contemplation    Appears/Sounds:  Cooperative  Motivated  Engaged  Anxious      Plan:  Focus on recovery environment  Monitor emotional/physical health  Continue to work through treatment plan goals    ROSEMARY Graham LADC

## 2018-10-03 ENCOUNTER — HOSPITAL ENCOUNTER (OUTPATIENT)
Dept: BEHAVIORAL HEALTH | Facility: CLINIC | Age: 33
End: 2018-10-03
Attending: FAMILY MEDICINE
Payer: COMMERCIAL

## 2018-10-03 PROCEDURE — 10020000 ZZH LODGING PLUS FACILITY CHARGE ADULT

## 2018-10-03 PROCEDURE — H2035 A/D TX PROGRAM, PER HOUR: HCPCS | Mod: HQ

## 2018-10-03 PROCEDURE — 99213 OFFICE O/P EST LOW 20 MIN: CPT | Performed by: PSYCHIATRY & NEUROLOGY

## 2018-10-04 ENCOUNTER — HOSPITAL ENCOUNTER (OUTPATIENT)
Dept: BEHAVIORAL HEALTH | Facility: CLINIC | Age: 33
End: 2018-10-04
Attending: FAMILY MEDICINE
Payer: COMMERCIAL

## 2018-10-04 PROCEDURE — H2035 A/D TX PROGRAM, PER HOUR: HCPCS | Mod: HQ

## 2018-10-04 PROCEDURE — H2035 A/D TX PROGRAM, PER HOUR: HCPCS

## 2018-10-04 PROCEDURE — 10020000 ZZH LODGING PLUS FACILITY CHARGE ADULT

## 2018-10-04 NOTE — CONSULTS
[facility], [place]   Psychiatric Consultation - follow up Note         Interim History and Subjective Reports:   The patient's care was discussed with the treatment team during the daily team meeting.  Staff reports: no acute issues    Depression severity scale 0-10 (10=most severe):  Today: 3    His mood is been improving.  He has noticed improvements since starting mirtazapine.  He is also sleeping much better at night.  Appetite is normal.  Concentration has improved.  He denied feeling helpless or hopeless.  He denied suicidal or homicidal thoughts.  He denied psychotic symptoms.  He is tolerating his medications well.    He discussed a recent visit to his primary care physician during which time his antihypertensive medication was increased.  He continues to keep a log of his blood pressure recordings throughout the day.    He is looking forward to returning home in approximately 2 weeks.  He has maintained contact with his wife and family and reports good relations with all.    His treatment is going well and he is motivated to maintain his sobriety by continuing treatment outside the hospital.  He plans to transition to an outpatient MI CD treatment program while pursuing employment.  He also inquired regarding outpatient mental health referrals for both medication management and psychiatric care, ideally in the [place] area.           Scheduled Medications:          Allergies:      Allergies   Allergen Reactions     Amoxicillin Unknown     Gluten Meal      Tylenol W/Codeine [Acetaminophen-Codeine] Unknown     Tylenol [Acetaminophen] Unknown     Wheat Bran GI Disturbance          Labs:   No results found for this or any previous visit (from the past 24 hour(s)).       Vitals:   BP (!) 154/113  Pulse 75  Temp 97.1  F (36.2  C)  Weight: 0 lbs 0 oz          Height: Data Unavailable           There is no height or weight on file to calculate BMI.        Mental Status Examination:    Appearance: awake, alert  Attitude:  cooperative  Eye Contact:  fair  Mood:  better  Affect:  appropriate and in normal range  Speech:  clear, coherent  Psychomotor Behavior:  no evidence of tardive dyskinesia, dystonia, or tics  Throught Process:  logical and linear  Associations:  no loose associations  Thought Content:  no evidence of suicidal ideation or homicidal ideation and no evidence of psychotic thought  Insight:  fair  Judgement:  intact  Oriented to:  time, person, and place  Attention Span and Concentration:  fair  Recent and Remote Memory:  fair         DIagnoses:     Unspecified depressive disorder (adjustment disorder versus complicated bereavement)  Social anxiety disorder  Alcohol use disorder, severe  Hypertension  Celiac disease        Recommendations:     Continue current medications which include mirtazapine and Lexapro.  He is tolerating the current dose fairly well and has been gaining improvement in targeted symptoms.  He was educated regarding time needed to  achieve full therapeutic response.  He was also educated regarding the potential for optimizing the dose if needed in the future.  He was happy to continue the current medications and content with the progress he has made so far.  We discussed various clinics near his current place of residence where he may initiate outpatient mental health treatment for both individual therapy and medication management.  He will forward this information to his wife who will further explore these options.      No follow-up will be necessary with me unless requested by the patient or unless there is a significant clinical change during the remainder of his stay at [facility].      Please reconsult as needed.

## 2018-10-05 ENCOUNTER — HOSPITAL ENCOUNTER (OUTPATIENT)
Dept: BEHAVIORAL HEALTH | Facility: CLINIC | Age: 33
End: 2018-10-05
Attending: FAMILY MEDICINE
Payer: COMMERCIAL

## 2018-10-05 PROCEDURE — H2035 A/D TX PROGRAM, PER HOUR: HCPCS | Mod: HQ

## 2018-10-05 PROCEDURE — 10020000 ZZH LODGING PLUS FACILITY CHARGE ADULT

## 2018-10-05 PROCEDURE — H2035 A/D TX PROGRAM, PER HOUR: HCPCS

## 2018-10-06 ENCOUNTER — HOSPITAL ENCOUNTER (OUTPATIENT)
Dept: BEHAVIORAL HEALTH | Facility: CLINIC | Age: 33
End: 2018-10-06
Attending: FAMILY MEDICINE
Payer: COMMERCIAL

## 2018-10-06 PROCEDURE — 10020000 ZZH LODGING PLUS FACILITY CHARGE ADULT

## 2018-10-06 PROCEDURE — H2035 A/D TX PROGRAM, PER HOUR: HCPCS

## 2018-10-07 ENCOUNTER — HOSPITAL ENCOUNTER (OUTPATIENT)
Dept: BEHAVIORAL HEALTH | Facility: CLINIC | Age: 33
End: 2018-10-07
Attending: FAMILY MEDICINE
Payer: COMMERCIAL

## 2018-10-07 PROCEDURE — H2035 A/D TX PROGRAM, PER HOUR: HCPCS | Mod: HQ

## 2018-10-07 PROCEDURE — 10020000 ZZH LODGING PLUS FACILITY CHARGE ADULT

## 2018-10-08 ENCOUNTER — HOSPITAL ENCOUNTER (OUTPATIENT)
Dept: BEHAVIORAL HEALTH | Facility: CLINIC | Age: 33
End: 2018-10-08
Attending: FAMILY MEDICINE
Payer: COMMERCIAL

## 2018-10-08 PROCEDURE — 10020000 ZZH LODGING PLUS FACILITY CHARGE ADULT

## 2018-10-08 PROCEDURE — H2035 A/D TX PROGRAM, PER HOUR: HCPCS | Mod: HQ

## 2018-10-08 PROCEDURE — H2035 A/D TX PROGRAM, PER HOUR: HCPCS

## 2018-10-08 NOTE — PROGRESS NOTES
Counselor spoke with staff outpatient counselor on 10/8/2018. Patient has been accepted into the [facility] Day program to begin 10/17/2018.    ROSEMARY Graham

## 2018-10-09 ENCOUNTER — HOSPITAL ENCOUNTER (OUTPATIENT)
Dept: BEHAVIORAL HEALTH | Facility: CLINIC | Age: 33
End: 2018-10-09
Attending: FAMILY MEDICINE
Payer: COMMERCIAL

## 2018-10-09 PROCEDURE — H2035 A/D TX PROGRAM, PER HOUR: HCPCS | Mod: HQ

## 2018-10-09 PROCEDURE — 10020000 ZZH LODGING PLUS FACILITY CHARGE ADULT

## 2018-10-09 PROCEDURE — H2035 A/D TX PROGRAM, PER HOUR: HCPCS

## 2018-10-09 NOTE — PROGRESS NOTES
UPDATE:    Counselor confirmed with staff (Lana at intake) patient's start date of 10/17/2018 at [facility] Co-occurring DOP with staff counselor ROSEMARY Blackmon.    ROSEMARY Rivers

## 2018-10-10 ENCOUNTER — HOSPITAL ENCOUNTER (OUTPATIENT)
Dept: BEHAVIORAL HEALTH | Facility: CLINIC | Age: 33
End: 2018-10-10
Attending: FAMILY MEDICINE
Payer: COMMERCIAL

## 2018-10-10 PROCEDURE — H2035 A/D TX PROGRAM, PER HOUR: HCPCS | Mod: HQ

## 2018-10-10 PROCEDURE — 10020000 ZZH LODGING PLUS FACILITY CHARGE ADULT

## 2018-10-10 PROCEDURE — H2035 A/D TX PROGRAM, PER HOUR: HCPCS

## 2018-10-10 NOTE — PROGRESS NOTES
"INDIVIDUAL SESSION SUMMARY    D) Met with client on 10/10/18 from 9:30-10:05. Client reported that he will start the [facility] group next week on the 17th and that he is working to put together a detailed schedule for himself. Client spoke about how he cannot have a lot of \"down time\" and how he doesn't want to fall back into old habits of wanting tv and playing games on his phone. Client spoke of goals to be more involved on campus, spending free time in AA or Al-Anon meetings, with his wife or extended family. Client spoke about feeling some \"anxiety\" as he plans to return home and stated that he will begin looking for a PT job immediately. Client stated that he has a referral for psychiatry and therapy and will let this therapist know if he needs any additional referrals. Client reported that his wife attended last night's family support group and that they plan to attend some Al-Anon and AA meetings together.     I) Individual session with client. Therapist encouraged client to continue with is self care activities including journaling, drawing, exercise, and AA attendance. Therapist spoke about the evening family program.      A) Client appears to have a bit more energy and spoke about plans to connect with peers in the community for support. Client has a tendency to isolate and hide his feelings until he feels safe. Client appears emotionally constricted and to lack skills for emotional regulation and stress management and would benefit from continuing to work with an individual therapist. Client appears to lack a daily routine, meaningful activities, and a sense of purpose since losing his job.     P) No future sessions scheduled. The client has an appointment scheduled with a mental health clinic in the community.    Marcie Jo, KRISS  10/10/2018    "

## 2018-10-11 ENCOUNTER — HOSPITAL ENCOUNTER (OUTPATIENT)
Dept: BEHAVIORAL HEALTH | Facility: CLINIC | Age: 33
End: 2018-10-11
Attending: FAMILY MEDICINE
Payer: COMMERCIAL

## 2018-10-11 PROCEDURE — H2035 A/D TX PROGRAM, PER HOUR: HCPCS

## 2018-10-11 PROCEDURE — 10020000 ZZH LODGING PLUS FACILITY CHARGE ADULT

## 2018-10-11 PROCEDURE — H2035 A/D TX PROGRAM, PER HOUR: HCPCS | Mod: HQ

## 2018-10-12 ENCOUNTER — HOSPITAL ENCOUNTER (OUTPATIENT)
Dept: BEHAVIORAL HEALTH | Facility: CLINIC | Age: 33
End: 2018-10-12
Attending: FAMILY MEDICINE
Payer: COMMERCIAL

## 2018-10-12 PROCEDURE — H2035 A/D TX PROGRAM, PER HOUR: HCPCS

## 2018-10-12 PROCEDURE — 10020000 ZZH LODGING PLUS FACILITY CHARGE ADULT

## 2018-10-12 PROCEDURE — H2035 A/D TX PROGRAM, PER HOUR: HCPCS | Mod: HQ

## 2018-10-12 NOTE — PROGRESS NOTES
Patient:  Glenn Simons            Adult CD Progress Note and Treatment Plan Review     Attendance  Please refer to OP BEH CD Adult Attendance Record Documentation Flowsheet    Support group attended this week: yes    Reporting sobriety:  yes    Treatment Plan     Treatment Plan Review competed on: 10/12/2018       Client preferred learning style:   Hands on  Demonstration    Staff Members [facility]; [facility]; Aysha Soni [facility]                         Received Supervision: None    Client: contributed to goals and plan.    Client received copy of plan/revised plan: Yes    Client agrees with plan/revised plan: Yes    Changes to Treatment Plan: No    New Goals added since last review: No additional goals added at this time    Goals worked on since last review: Patient is focused on strengthening his sober support network and developing sober living/coping skills. Relapse prevention. Aftercare planning.    Strategies effective: yes    Strategies need these changes: none    1) Care Coordination Activities:  Patient met with staff  to review plans to attend outpatient at [facility] [place]  2) Medical, Mental Health and other appointments the client attended: Patient attended individual therapy, patient met with staff psychologist for mental health follow up  3) Medication issues: No concerns at this time.  4) Physical and mental health problems: Patient reports a diagnosis of celiac disease.  5) Any changes in Vulnerable Adult Status?  No If yes, add to treatment plan and individual abuse prevention plan.  6) Review and evaluation of the individual abuse prevention plan: Current IAPP for this program is adequate for this client      ASAM Risk Rating:    Dimension 1, 0: Reports substance of use as Alcohol. Reports last date of use as 9/11/18. Patient denies any withdrawal symptoms that would interfere with full participation in treatment programming at this  "time.     Dimension 2, 0: Patient reports diagnosis of Celiac Disease. Patient reports condition is well-managed. Patient reports medication compliance. Patient appears able to access medical aid as needed. Patient attended staff RN lecture on 9/30/2018.    Dimension 3, 2: Patient has attended appointments with staff psychotherapist as scheduled. Patient reports he has set up appointments with an outside therapist as part of his aftercare plan. Patient also met with staff psychiatrist for a mental health assessment. Patient presents with significant improvement in overall mood and engagement.  Patient was screened for suicidal risk throughout programming. Patient was rated as \"low risk\". Patient denies any suicidal thoughts or ideations at this time.     Dimension 4, 0: Patient has been an enthusiastic participant in all levels of programming. He is active in the patient community building relationships with peers and offering assistance to new arrivals. All assignments are completed and presented in a timely manner.He continues to complete and present his \"Patient Journal\" in each morning group.    Dimension 5, 4: Patient appears at high risk for relapse at this time due to limited long-term sobriety. Patient completed and presented the assignment titled \"Emotions and Relapse\". Patient attended a Relapse Prevention workshop on 10/13. Patient focused on identifying individual relapse triggers and warning signs. He is scheduled to transition into the [facility] on 10/17.    Dimension 6, 3: Patient has been dedicated to developing a sober support network. He attends both mandatory and optional support group meetings each week.  Patient goals focus on establishing a structured and focused lifestyle in recovery. Patient completed and presented an assignment listing 25 behaviors/activities which will support recovery. He also completed the \"Setting and Pursuing Goals\" assignment. The worksheet allows " patients the opportunity to identify potential crisis situations and appropriate responses.    Any changes in Vulnerable Adult Status?  No  If yes, add to treatment plan and individual abuse prevention plan.    Guide to Risk Ratings for Suicidality:   IDEATION: Active thoughts of suicide? INTENT: Intent to follow on suicide? PLAN: Plan to follow through on suicide? Level of Risk:   IF Yes Yes Yes Patient = High Emergent   IF Yes Yes No Patient = High Urgent/Non-Emergent   IF Yes No No Patient = Moderate Non-Urgent   IF No No   No Patient = Low Risk   The patient's ADDITIONAL RISK FACTORS and lack of PROTECTIVE FACTORS may increase their overall suicide risk ratings.     Patient's/client's current risk rating:  Low Risk    Family Involvement:   none schedule this week    Data:   offered feedback   good insight   client did actively participate      Intervention:   Aftercare planning  Behavior modification  Cognitive Behavioral Therapy  Counselor feedback  Education  Emotional management  Group feedback  Relapse prevention  Twelve Step facilitation  Mental health education      Assessment:   Stages of Change Model  contemplation    Appears/Sounds:  Cooperative  Motivated  Engaged  Anxious      Plan:  Focus on recovery environment  Monitor emotional/physical health  Continue to work through treatment plan goals    ROSEMARY Graham LADC

## 2018-10-13 ENCOUNTER — HOSPITAL ENCOUNTER (OUTPATIENT)
Dept: BEHAVIORAL HEALTH | Facility: CLINIC | Age: 33
End: 2018-10-13
Attending: FAMILY MEDICINE
Payer: COMMERCIAL

## 2018-10-13 DIAGNOSIS — F17.200 NICOTINE DEPENDENCE: Primary | ICD-10-CM

## 2018-10-13 PROCEDURE — 10020000 ZZH LODGING PLUS FACILITY CHARGE ADULT

## 2018-10-13 PROCEDURE — H2035 A/D TX PROGRAM, PER HOUR: HCPCS

## 2018-10-13 NOTE — IP AVS SNAPSHOT
Medication List       Patient:  JAMES LOW   :  September 15, 1985   Physician:  Ruslan Johnson MD           This is your record.  Keep this with you and show to your community pharmacist(s) and physician(s) at each visit.     Allergies:  AMOXICILLIN - Unknown     GLUTEN MEAL - (reactions not documented)     TYLENOL W/CODEINE [ACETAMINOPHEN-CODEINE] - Unknown     TYLENOL [ACETAMINOPHEN] - Unknown     WHEAT BRAN - GI Disturbance               Medications  Valid as of: 2018 -  9:06 AM    Generic Name Brand Name Tablet Size Instructions for use    AmLODIPine Besylate NORVASC 5 MG Take 1 tablet (5 mg) by mouth daily        Escitalopram Oxalate LEXAPRO 10 MG Take 1 tablet (10 mg) by mouth daily        HydrOXYzine HCl ATARAX 25 MG Take 1 tablet (25 mg) by mouth every 6 hours as needed for anxiety or other (adjuvant pain)        Mirtazapine   Take 15 mg by mouth At Bedtime        Multiple Vitamins-Minerals THERA-VIT-M  Take 1 tablet by mouth daily        Nicotine NICODERM CQ 14 MG/24HR Place 1 patch onto the skin every 24 hours        Thiamine HCl thiamine 100 MG Take 1 tablet (100 mg) by mouth daily        .           .           .           .

## 2018-10-13 NOTE — PROGRESS NOTES
Nursing Discharge Planning Meeting    Writer completed discharge planning meeting with patient. Discharge is planned for Tuesday, 10/16/2018.    Discussed appropriate follow up care to manage VALENTINO, MH and medical issues and to obtain medication refills. Patient given a copy of their current medications for reference. Questions were answered at this time and the patient verbalized an understanding of the post-discharge follow up plan.    Patient has scheduled appt with  His PCP in November for f/u and will see Psychiatry from [facility] to manage Psychotropic medications.    Continue to support patient in discharge planning as needed to assure appropriate continuity of care.     Tobacco Cessation  Patient participated in the nicotine replacement therapy for tobacco cessation or reduction during their treatment programming: Yes.  Pt has stopped smoking as of September 9th, 2018    The patient was provided with community resources for follow-up to continue tobacco cessation support once in the community. Also the patient was encoruaged to discuss their tobacco cessation efforts with the primary care provider.

## 2018-10-13 NOTE — IP AVS SNAPSHOT
MRN:[id number]                      After Visit Summary   10/13/2018    Glenn Simons    MRN: [id number]           Visit Information        Provider Department      10/13/2018  7:15 AM ADULT [facility]        Your next 10 appointments already scheduled     Oct 14, 2018  7:15 AM CDT   Treatment with ADULT LODGING PLUS A   [facility] ([facility])    [address] [place] [address]   [phone]            Oct 15, 2018  7:15 AM CDT   Treatment with ADULT LODGING PLUS A   [facility] ([facility])    [address] [place] [address]   [phone]            Oct 17, 2018  9:00 AM CDT   Treatment with CO-OCCURINIG DOP MIX PHASE 1   [facility] ([facility])    [address] [place] [address]   [phone]            Oct 22, 2018  9:00 AM CDT   Treatment with CO-OCCURINIG DOP MIX PHASE 1   [facility] ([facility])    [address] [place] [address]   [phone]            Oct 23, 2018  9:00 AM CDT   Treatment with CO-OCCURINIG DOP MIX PHASE 1   [facility] ([facility])    [address] [place] [address]   [phone]            Oct 24, 2018  9:00 AM CDT   Treatment with CO-OCCURINIG DOP MIX PHASE 1   [facility] ([facility])    [address] [place] [address]   [phone]            Oct 29, 2018  9:00 AM CDT   Treatment with CO-OCCURINIG DOP MIX PHASE 1   [facility] ([facility])    [address] [place] [address]  "[phone]   [phone]            Oct 30, 2018  9:00 AM CDT   Treatment with CO-OCCURINIG DOP MIX PHASE 1   [facility] ([facility])    [address] [place] [address]   [phone]            Oct 31, 2018  9:00 AM CDT   Treatment with CO-OCCURINIG DOP MIX PHASE 1   [facility] ([facility])    [address] [place] [address]   [phone]            2018  9:00 AM CST   Treatment with CO-OCCURINIG DOP MIX PHASE 1   [facility] ([facility])    [address] [place] [address]   [phone]              [employer] Information     [employer] lets you send messages to your doctor, view your test results, renew your prescriptions, schedule appointments and more. To sign up, go to [email or web address] . Click on \"Log in\" on the left side of the screen, which will take you to the Welcome page. Then click on \"Sign up Now\" on the right side of the page.     You will be asked to enter the access code listed below, as well as some personal information. Please follow the directions to create your username and password.     Your access code is: [email or web address]  Expires: 12/10/2018  6:03 PM     Your access code will  in 90 days. If you need help or a new code, please call your [place] clinic or [phone].        Care EveryWhere ID     This is your Care EveryWhere ID. This could be used by other organizations to access your [place] medical records  [id number]        Equal Access to Services     TANK REYES AH: Kraig Waggoner, lenin aquino, jacquie kaalmada adithya, ronn olivera. So [facility] [phone].    ATENCIÓN: Si habla español, tiene a buchanan disposición servicios gratuitos de asistencia lingüística. Llame al [phone].    We " comply with applicable federal civil rights laws and [place] laws. We do not discriminate on the basis of race, color, national origin, age, disability, sex, sexual orientation, or gender identity.

## 2018-10-14 ENCOUNTER — HOSPITAL ENCOUNTER (OUTPATIENT)
Dept: BEHAVIORAL HEALTH | Facility: CLINIC | Age: 33
End: 2018-10-14
Attending: FAMILY MEDICINE
Payer: COMMERCIAL

## 2018-10-14 PROCEDURE — H2035 A/D TX PROGRAM, PER HOUR: HCPCS

## 2018-10-14 PROCEDURE — 10020000 ZZH LODGING PLUS FACILITY CHARGE ADULT

## 2018-10-15 ENCOUNTER — HOSPITAL ENCOUNTER (OUTPATIENT)
Dept: BEHAVIORAL HEALTH | Facility: CLINIC | Age: 33
End: 2018-10-15
Attending: FAMILY MEDICINE
Payer: COMMERCIAL

## 2018-10-15 PROCEDURE — H2035 A/D TX PROGRAM, PER HOUR: HCPCS | Mod: HQ

## 2018-10-15 PROCEDURE — H2035 A/D TX PROGRAM, PER HOUR: HCPCS

## 2018-10-15 PROCEDURE — 10020000 ZZH LODGING PLUS FACILITY CHARGE ADULT

## 2018-10-15 NOTE — PROGRESS NOTES
[facility] Discharge Summary/Instructions     Patient: Glenn Simons  MRN: [id number]   : 1985 Age: 33 year old Sex: male   -  Focus of Treatment / Discharge Recommendations    Personal Safety/ Management of Symptoms    * Follow your safety plan.  Report increased symptoms to your care team and /or go to the nearest Emergency Department.    * Call crisis lines as needed    [facility] [phone]                [facility] [phone]  [facility] [phone]              [facility]is Connection [phone]  [facility] [phone]              [facility] [phone]  [facility] [phone]          National Suicide Prevention [phone]  [facility] [phone]            Suicide Prevention [phone]  [facility] [phone]    Abstinence/Relapse Prevention  * Take all medicines as directed.  Carry a current list of medicines with you.  * Use coping skills: Continue to develop relapse prevention skills.  * Do not use illicit (street) drugs, controlled substances (narcotics) or alcohol.    Develop/Improve Independent Living/Socialization Skills:  Continue to build and strengthen sober support network.    Community Resources/Supports and Discharge Planning:  Support group meetings. Transition to [facility].  Follow up with psychiatrist / main caregiver: N/A   Next visit: N/A    Follow up with your therapist: Attend scheduled appointments with personal therapist.   Next visit: As arranged    Go to group therapy and / or support groups at: Transition to [facility] 10/17/18. Complete program and follow aftercare recommendations.    See your medical doctor about:  Regular physicals. Medication refills as needed.    Other:     Client Signature:_______________________   Date / Time:___________  Staff Signature:________________________   Date / Time:___________

## 2018-10-16 NOTE — PROGRESS NOTES
CHEMICAL DEPENDENCY TRANSITION  SUMMARY    PATIENT NAME:  Glenn Simons  :  1985    EVALUATION COUNSELOR: Rut Momin MA, [facility]  TREATMENT COUNSELORS: Irineo Durant, [facility]; Zully Monroe [facility]; Aysha Soni [facility]  REFERRAL SOURCE:  [employer]  PROGRAM:  [facility] Dependency [facility]  ADMISSION DATE: 2018  DATE OF LAST SESSION: 10/15/2018  TRANSITION DATE: 10/16/2018  ADMISSION DIAGNOSIS:    303.90 (F10.20) - Alcohol Use Disorder Severe  305.10 (F17.200) - Tobacco Use Disorder Moderate    TRANSITION  DIAGNOSIS:   303.90 (F10.20) - Alcohol Use Disorder Severe  305.10 (F17.200) - Tobacco Use Disorder Moderate    TRANSITION  STATUS: Patient completed program with staff approval  LAST USE DATE: Patient reported last date of use as 2018  DAYS OF TREATMENT COMPLETED:  28 Days completed    PRESENTING INFORMATION:  Patient transferred to [facility] following a stay on [facility] floor 10 and 8. Patient presented with Alcohol substance use disorder Severe. Patient presented with symptoms of depression. Patient reported this as his second treatment facility and presented with high motivation for recovery.      SERVICES PROVIDED:  Our services included assessment, treatment planning and education regarding chemical dependency, mental illness, relationships, and relapse prevention.  The patient also participated in individual therapy, group therapy, recovery oriented workshops, spiritual care counseling, recovery skills training and aftercare planning.    ISSUES ADDRESS IN TREATMENT:    DIMENSION 1 - ACUTE INTOXICATION/WITHDRA DAYANARA POTENTIAL  ADMISSION RISK RATIN  TRANSITION  RISK RATIN  Patient entered into [facility] on 2018. Patient reported his substance of use as Alcohol. Patient reported his last date of use as 2018. Upon admissions patient reported experiencing minor withdrawal symptoms. Upon transition patient denied any withdrawal  "symptoms that would interfere with full participation in the recovery lifestyle. Patient was continually monitored throughout treatment. Due to patient denying any current withdrawal symptoms, patient's risk rating was lowered from \"1\" to \"0\".     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 Patient reported that his condition is under control so long as he watches his diet. Patient reported that he does have a primary care provider. While in treatment patient reported taking his medications as prescribed. Upon transition patient appeared able to acces medical aid as needed. Patient was continuously monitored throughout treatment. Due to patient reporting that his medical diagnosis was under control and denying any flare-ups while in treatment, patient's risk rating was lowered from \"1\" to \"0\".     DIMENSION 3 - EMOTIONAL, BEHAVIORAL, COGNITIVE CONDITIONS AND COMPLICATIONS  ADMISSION RISK RATIN  TRANSITION  RISK RATIN  Upon admissions patient reported Diagnosis of DAIANA and MDD. To aid patient in addressing these concerns patient met with staff therapist KRISS Narvaez on a continual basis. Patient reported that this was a healthy therapeutic relationship. Patient also met with staff counselor Brock Lopez MA, , [facility] to review mental health diagnoses and suicidal intent. Patient was given a suicidal risk screening and rated as \"low risk\". While in treatment patient denied any suicidal thoughts or ideations. Patient was given referrals to continue mental health therapy following [facility]. Upon admissions patient also reported reduced self-esteem and a negative sense of self. To aid patient in identifying positive self-characteristics patient completed and presented the assignment titled \"Positive Traits\". Patient " "focused on replacing his negative self-talk with positive self-affirmations. Due to patient's active involvement in mental health therapy and due to patient appeared increase in positive self-talk patient's risk rating was lowered from \"2\" to \"1\".     DIMENSION 4 - READINESS FOR CHANGE  ADMISSION RISK RATIN  TRANSITION  RISK RATIN  Upon admissions patient reported his primary motivation for recovery as to allow himself time to heal. While in treatment patient reported high levels of motivation for recovery, however, patient appeared to show complacency in his recovery lifestyle as evidenced by limited sober time and multiple past relapses. TO aid patient in increasing his overall motivation for recovery patient completed and presented the assignment titled \"Drug Use History\". Patient focused on identifying his past use patterns in order to identify the consequences that accompanied them. Patient identified the values that were violated due to his use in order to increase his motivation to remain sober. Patient also completed and presented the assignment titled \"2 by 2\". Patient focused on identifying what his life would look like in two years if he remained sober verses if he continued to use in order to identify the difference that sober my accomplish. Throughout treatment patient also presented daily gratitude journals. Patient worked to express the positive events that occurred throughout the day as well as the events that negatively impacted him to identify the role that he may have played. Throughout treatment patient was continually involved and participatory. He offered continually feedback and involvement. Due to patient's active involvement in treatment and due to patient appearing to hold an overall increase in motivation for recovery, patient's risk rating was lowered from \"2\" to \"0\".    DIMENSION 5 - RELAPSE, CONTINUED USE AND CONTINUED PROBLEM POTENTIAL   ADMISSION RISK RATIN  TRANSITION  " "RISK RATING: 3  Upon admissions patient appeared at high risk for relapse due to limited periods of sobriety and multiple past relapses. To aid patient in maintaining long-term sobriety Patient met with staff  Amadou Pascual [facility] to identify outpatient programming. Upon transition patient secured outpatient programming through [facility] with psychotherapist Abelardo Kaur beginning on 10/17/2018. To further aid patient in continual relapse prevention, Patient attended two relapse prevention workshops while in [facility]. Patient focused on first identifying the differences between a trigger and a warning sign, and then worked to develop coping skills focused on reducing the impact on triggers. Patient also completed and and presented the assignments titled \"Own Reasons to Quit\" and \"Emotions and Relapse\". Patient focused on first identifying his motivation to remain sober and then working to identify barriers that may exist to this goal. Due to patient's active involvement in relapse prevention skills and due to patient reporting various coping skills that are used daily, patient's risk rating was lowered from \"4\" to \"3\".     DIMENSION 6 - RECOVERY ENVIRONMENT  ADMISSION RISK RATING: 3  TRANSITION  RISK RATIN  Upon admissions patient reported being unemployed. To aid patient in identifying healthy activities to develop a more structured schedule patient developed a list of 25 behaviors/activities supportive of recovery. Patient also completed and presented the assignment titled \"Setting and Pursuing Goals\". Patient focused on identifying the goals that he hoped to achieve in early sobriety as well as identifying the initial steps to begin meeting these goals. To further increase a healthy recovery environment patient attended a minimum of three AA/NA meetings per week, often attending over five meetings per week. Patient focused on developing a sober network that may continue " "to expand following treatment. Upon admissions patient reported experiencing damaged relationships due to his use. While in treatment patient attended two relationships workshops to begin identifying ways to aid in healing these relationships. Patient focused on identifying healthy boundaries and communication skills including assertiveness. Due to patient's active involvement in the recovery community, and due to patient maintaining contact with his wife throughout treatment to begin healing this relationship, patient's risk rating was lowered from \"3\" to \"2\".     STRENGTHS:  While in treatment patient was cooperative and engaged. Patient also maintained a positive attitude while in treatment. Patient was an active participant in group therapy and was always open for feedback from his counselors and peers. Patient appears motivated for recovery at this time and willing to incorporate positive changes into his life.     LIVING ARRANGEMENTS AT TRANSITION: Upon transition patient reported plans to return to his apartment with his wife while also attending Outpatient programming at [facility].     PROGNOSIS:  Prognosis for this patient is favorable at this time. This can be maintained if the patient follows the continuing care recommendations below.      CONTINUING CARE RECOMMENDATIONS AND REFERRALS:    1.  Abstain from all mood-altering chemicals unless prescribed by a licensed medical provider, and take all medications as prescribed.  2.  Attend a minimum of three AA/NA/Sober support groups weekly in the community.  3.  Maintain regular contact with your sponsor.   4.  Admit immediately into [facility] Outpatient and follow all recommendations.   Start date: 10/16/2018   Initial start time: 8:30 a.m.   Counselor: Abelardo Kaur [phone]  5.  Continue to invest in building a sober support network.  6.  Continue to monitor and understand relapse triggers and stressors through the use and " development of healthy coping skills.  7.  Maintain medication compliance  8. Attend all medical appointments  9. Schedule and attend all mental health/therapy appointments.        ROSEMARY Graham

## 2018-10-17 ENCOUNTER — HOSPITAL ENCOUNTER (OUTPATIENT)
Dept: BEHAVIORAL HEALTH | Facility: CLINIC | Age: 33
End: 2018-10-17
Attending: SOCIAL WORKER
Payer: COMMERCIAL

## 2018-10-17 ENCOUNTER — HOSPITAL ENCOUNTER (OUTPATIENT)
Dept: BEHAVIORAL HEALTH | Facility: CLINIC | Age: 33
End: 2018-10-17
Attending: FAMILY MEDICINE
Payer: COMMERCIAL

## 2018-10-17 DIAGNOSIS — F19.90 SUBSTANCE USE DISORDER: ICD-10-CM

## 2018-10-17 PROCEDURE — H2035 A/D TX PROGRAM, PER HOUR: HCPCS | Mod: HQ

## 2018-10-17 PROCEDURE — H2035 A/D TX PROGRAM, PER HOUR: HCPCS

## 2018-10-17 NOTE — PROGRESS NOTES
Patient:  Glenn Simons    Date: October 17, 2018    Comprehensive Assessment UPDATE        Comprehensive Summary Update and Review  Counselor met with patient on 10/17/2018 and reviewed the Comprehensive Assessment.    The following updates have been made:Other: Client completed [facility] program yesterday, 10/16/18. Client reports their last use day for alcohol was on 09/11/18.

## 2018-10-17 NOTE — PROGRESS NOTES
Initial Services Plan        Service Initiation Date: 10/17/2018    Immediate health and/or safety concerns: No    Identify health and safety concern(s) below and include plan to address:    None Identified    Client issues to be addressed in the first treatment sessions:     Fear of being in a new group and/or speaking in front of people    Treatment suggestions for client during the time between intake (admit date) and completion of the individual treatment plan:     Look for a sober support network, i.e. 12 step, [facility], [facility], etc  Tour the treatment center or outpatient clinic  Introduce yourself to your treatment group. Spend time getting to know your peers  Review your patient or client handbook  Begin working on your treatment goal list      Completed by: Abelardo Kaur MA, LMFT, [facility]  Date completed: 10/17/2018 at 6:46 AM

## 2018-10-17 NOTE — PROGRESS NOTES
Outcome of vulnerable Adult Assessment for Outpatients:    1.  Do you have a physical, emotional or mental infirmity or dysfunction?       Yes (explain) - Depression and substance use disorder.    2.  Does this issue impair your ability to provide for your own care without help, including providing yourself with food, shelter, clothing, healthcare or supervision?       No      3.  Because of this issue, I need assistance to protect myself from maltreatment by others.      No    Based on the above information:    This person is not a functional Vulnerable Adult according to [place] Statute 626.5572 subdivision 21.

## 2018-10-17 NOTE — PROGRESS NOTES
"Key to reading dimensions:   -Bolded text in a dimension indicates information about a client at service initiation.  -Underlined portions in a dimension indicate important information that writer is carrying forward from week to week as a reminder.    CD ADULT Progress Note     Treatment Plan Review completed on:  10/17/2018     Attendance Dates: 10/17/2018.      Total # of P1 Group Sessions: 1  Total # of P2 Group Sessions: 0  Total # of P3 Group Sessions: 0  Total # of 1:1 Sessions: 1     Length of stay/projected discharge date: 02/15/19.      MONDAY TUESDAY WEDNESDAY THURSDAY FRIDAY SATURDAY SUNDAY TOTAL HOURS   Group Therapy    3     3   Specialty Groups*            1:1    1     1   Family Program           [place]             Phase III             Absent (place \"X\")             Total's   4     4     *Specialty Groups include Mental Health Care, Assertiveness and Communication, Sobriety Maintenance Skills, Spiritual Care, Stress Management, Relapse Prevention, Family Systems.                             Learning Style:    Visual  Hands on  Verbal  Demonstration    Staff member contributing: Abelardo Kaur MA, KRISS, KELSEYC     Received supervision: No.    Client contributed to goals and plan: Yes    Client received a signed copy of treatment plan/revised plan:  Treatment plan will be completed with client on Monday, 10/22/18.     Changes to Treatment Plan: Treatment plan will be completed with client on Monday, 10/22/18.     Client agrees with plan/revised plan:  Treatment plan will be completed with client on Monday, 10/22/18.     Any changes in Vulnerable Adult Status:  No    1) Care Coordination Activities: Yes (explain) - With LP+ counselors.  2) Medical, Mental Health and other appointments the client attended: SEE DIM's II & III below.  3) Medication issues: No None.  4) Physical and mental health problems: SEE DIM's II & III below.  5) Review and evaluation of the individual abuse prevention plan: N/A " "    Substance Use Disorders:    303.90 (F10.20) Alcohol Use Disorder Severe    ASAM Risk Ratings and Data     DIMENSION 1: Acute Intoxication/Withdrawal  The client's ability to cope with withdrawal symptoms and current state of intoxication     Acute Intoxication/Withdrawal - Current Risk Factor:  0    Reporting a sober date of: 09/11/18 for alcohol.    Goals:  Develop effective strategies to maintain sobriety. Report any substance or alcohol use to both counselor and the group.     Data: Client denied nor demonstrated any signs/symptomology of intoxication and/or withdrawal. Client denied having any symptoms of PAW. Client confirmed his last use date was on 09/10/18 for alcohol.     DIMENSION 2:  Biomedical Conditions and Complaints  The degree to which any physical disorder would interfere with treatment for substance abuse and the client's ability to tolerate any related discomfort     Biomedical Conditions and Complaints - Current Risk Factor:  1    Goals: Disclose CD status to medical providers and follow up with medical interventions while in DOP.     Data: Client rated his overall health on a scale of 1-10 (1=poor, 10=excellent): 9. Client reports doing the following for self-care during the past week; \"getting rest and ready to start treatment after [facility].\" Client reports the following changes to his physical health over the past week; \"None\". Client reports the following upcoming doctor s appointments: None.     > Client reports the following physical health conditions;  Past Medical History:   Diagnosis Date     ACD (adult celiac disease)      Celiac disease        DIMENSION 3:  Emotional/Behavioral/Cognitive Conditions and Complications  The degree to which any condition or complications are likely to interfere with treatment for substance abuse or with function in significant life areas and the likelihood of risk of harm to self or others.     Emotional/Behavioral - Current Risk " "Factor:  1    DSM-5 Diagnoses:   Official diagnosis (es) per Luis Armando Lopez MA, LP, [facility] on 10/08/18;   > 296.32 (F33.1) Major Depressive Disorder, Recurrent Episode, Moderate  > 300.02 (F41.1) Generalized Anxiety Disorder     Suicide Assessment:   Risk Status    Ideation - Active thoughts of suicide Intent to follow through on suicide Plan for completing suicide    Yes No Yes No Yes No   Emergent  x  x  x   Urgent / Non-Emergent  x  x  x   Non- Urgent  x  x  x   No Current/Active Risk  x  x  x      Goals: Understand the relationship between mental health concerns and addiction.     Data: Client reports feeling the following two emotions today; \"hopeful and anxious.\" Client rated the following MH symptoms on a scale of 1-10 (0=did not endorse): sleeplessness- 2, fatigue- 0, difficulty concentrating- 4, mood swings- 2, irritability- 2, sadness- 1, excessive sleep- 1, racing thoughts- 4, hopelessness- 3. Client denied suicidal ideation or self-injurious behavior.    Client reports the following appointment with his psychiatrist; Friday, October 26th at 2PM.     > Client reports taking the following psychotropic medications; Hydroxyzine and Lexapro.    >Client reports his risk factors are; \"MH diagnosis with lack of historical treatment of MI, early recovery, significant physical health concern (Celiacs disease), and unemployment\".    >Client reports his protective factors are; \"sense of responsibility to family, motivation towards receiving treatment, and sober support group participation\".     DIMENSION 4:  Readiness to Change  Consider the amount of support and encouragement necessary to keep the client involved in treatment.     Readiness to Change - Current Risk Factor:  0    Goals:  Increase awareness with how substance use is conflicted with personal values and address any ambivalence to change.     Data: Client rated their motivation of the week on a scale of 1-10 (1=low, 10= high): 9. Client reports his main " "motivation for sobriety is \"bettering my health\". Client reports \"bad environments\" is what blocks motivation for sobriety for him. Client reports using the following coping strategies that are helpful for his continued sobriety: \"structure, organizing and staying busy.\"      DIMENSION 5:  Relapse/Continued Use/Continued Problem Potential  Consider the degree to which the client recognizes relapse issues and has the skills to prevent relapse of either substance use or mental health problems.     Relapse/Continued Use/Continued Problem Potential - Current Risk Factor:  3    Goals:  Increase awareness of the disease concept of addiction and insight into personal relapse triggers and strategies to address.     Data: Client rated his \"Average Craving Rating\" on a scale of 1-10 (1=low, 10= high): 1. Client reports that the following things helped minimize his cravings this past week; \"not having alcohol around.\" Client denies having any triggers this past week and reports that \"none as I was just in treatment and graduated yesterday morning\".     DIMENSION 6:  Recovery Environment  Consider the degree to which key areas of the client's life are supportive of or antagonistic to treatment participation and recovery.     Recovery Environment - Current Risk Factor: 2    Support group meetings attended this week: 4  Where: [employer]  If zero attendance, what s preventing you: N/A.    Do you currently have a sponsor: No  Did you have contact with your sponsor this week? N/A    Did family agree to attend family week:  NA    If yes: NA    Goals:  Increase sober support network and work on obtaining employment.     Data: Client rated their \"Living Situation\" on a scale of 1-10 (1=very helpful, 10=very stressful): 8 with the stress of moving back home from treatment. Client reports that he is living with his wife and reports everything is supportive. Client reports during the past week that he \"attended meetings and " "started group\" as a way to expand his sober support network. Client reports he enjoyed, \"going to a carnival\" for sober fun activities this past week.   Employment: Client reports he is unemployed.  Volunteerism: No.     Intervention: Counselor/therapist used Behavioral Therapy, Cognitive Behavioral Therapy, Counselor Feedback, Education, Emotional management, Group Feedback, Mindfulness, Motivational Enhancement Therapy, Relapse Prevention, Twelve Step Facilitation, DBT and REBT.  >Client practiced the following skills in group session this week; mindfulness based stress reduction skills, along with breathing exercises. Writer provided client with verbal interventions including: validation, support, and psycho-education.    >Client practiced the following skills in group session this week; Client learned about \"self compassion\" this week and how it can be a very important factor on ones recovery program. Client learned how and when to have self compassion. Client also learned about all the different kinds off support group meetings and how there is groups for almost everyone.       Assessment:    Stages of Change Model: Client is currently in the Action Stage.  >Client was observed using the body scan and reported it helped with relaxation and anxiety. Client was observed using breathing exercises that were covered again during this weeks group session and client reported it helped with relaxation. Client appeared very open to continued learning for lifelong sobriety.    >Client was able to \"teach back\" the skills he learned on \"Self Compassion\". Client was able to do a role play exercise with fellow group members regarding practicing how to identify when and when they are not having appropriate self-compassion as well as what to say to the another person when they identify that they lack self compassion.     Plan:   -Client will spend time getting acclimated to the group.   -Counselor and client to complete " treatment plan and for client to begin working on treatment plan strategies/objectives.  -Continue to attend 2-3 sober support group meetings each week (this includes non-12-step/AA alternative meetings).  -Client will meet with his therapist once per week.  -Client will continue to work on treatment plan objectives.  -Client will follow all recommendations of counselor and any other medical provider which includes taking all medications as prescribed.    -Client will attend all weekly Phase 1 group sessions.   -Client to engage in sober activities each week.  -Client will spend at least an hour per day looking for a job as client identified this as a goal for him on his treatment plan.      Abelardo Kaur MA, LMFT, [facility]

## 2018-10-19 NOTE — ADDENDUM NOTE
Encounter addended by: Suzi Juares [facility][note 3] on: 10/19/2018  1:43 PM<BR>     Actions taken: Pend clinical note

## 2018-10-19 NOTE — ADDENDUM NOTE
Encounter addended by: Suzi Juares [facility] on: 10/19/2018  2:12 PM<BR>     Actions taken: Sign clinical note

## 2018-10-19 NOTE — PROGRESS NOTES
Comprehensive Assessment Summary     Based on client interview, review of previous assessments and   comprehensive assessment interview the following diagnosis and recommendations are:     Patient: Glenn Simons  MRN; [id number]   : 1985  Age: 33 year old Sex: male  Client meets criteria for:  303.90 Alcohol Dependence  305.10 Nicotine Dependence    Dimension One: Acute Intoxication/Withdrawal Potential     Ratin  (Consider the client's ability to cope with withdrawal symptoms and current state of intoxication)       Client successfully completed [facility] on 10/16/2018.  Client reported his substance of use as Alcohol. Client continued to report his last date of use as 2018. Upon transition patient denied any withdrawal symptoms that would interfere with full participation in the recovery lifestyle.     Dimension Two: Biomedical Condition and Complications    Ratin  (Consider the degree to which any physical disorder would interfere with treatment for substance abuse, and the client's ability to tolerate any related discomfort; determine the impact of continued chemical use on the unborn child if the client is pregnant)       Client reported having child birth Celiac Disease physical health diagnoses. Client reported that his condition is under control so long as he watches his diet. Client reported have a primary care provider. Client reported taking his medications as prescribed. Client has access to his medical facility as needed.      Dimension Three: Emotional/Behavioral/Cognitive Conditions & Complications  Ratin  (Determine the degree to which any condition or complications are likely to interfere with treatment for substance abuse or with functioning in significant life areas and the likelihood of risk of harm to self or others)   Client reported have DAIANA and MDD diagnosis. Client currently sees a staff psychotherapist, KRISS Narvaez at [facility].  Client completed his mental health diagnosis by staffcounselor Brock Lopez, MA, LP, [facility]. Upon admissions client reported having minimal self-esteem and a negative sense of self. It would benefit client to continue to replace his negative self-talk with positive self-affirmations. Client completed a suicidal risk screening and rated as \"low risk\". Client denies SI/HI.     Dimension Four: Treatment Acceptance/Resistance     Ratin  (Consider the amount of support and encouragement necessary to keep the client involved in treatment)   Client reported his primary motivation for recovery as to allow himself time to heal. While in treatment patient reported high levels of motivation for recovery, however, client appeared to show complacency in his recovery lifestyle as evidenced by limited sober time and multiple past relapses. It would benefit client to continue to identifying his past use patterns in order to identify the consequences that accompanied them.   Client was able to focuse on identifying what his life would look like in two years if he remained sober verses if he continued to use in order to identify the difference that sober my accomplish.     Dimension Five: Continued Use/Relaspe Prevention     Rating:  3  (Consider the degree to which the client's recognizes relapse issues and has the skills to prevent relapse of either substance use or mental health problems)       Client is at a high risk for relapse due to minimal amount of sobriety time and multiple past relapses. Client will participate IOP at [facility] with psychotherapist Abelardo Kaur beginning on 10/17/2018. Client will continue to work on his continual relapse prevention, identifying the differences between a trigger and a warning sign, and then worked to develop coping skills focused on reducing the impact on triggers.      Dimension Six: Recovery Environment     Ratin  (Consider the degree to which key " areas of the client's life are supportive of or antagonistic to treatment participation and recovery)       Client is unemployed.  Client has worked on identifying healthy activities to develop a more structured schedule which would include continued AA/NA sober/supportive group of three AA/NA meetings per week.  Client will continue to focuse on developing a sober network that may continue to expand following treatment. Client reported having relationship conflict with family and  Friends due to his use.      I have reviewed the information on the assessment, psychosocial and medical history and checklist:  is current

## 2018-10-19 NOTE — PROGRESS NOTES
Comprehensive Assessment Summary     Based on client interview, review of previous assessments and   comprehensive assessment interview the following diagnosis and recommendations are:     Patient: Glenn Simons  MRN; [id number]   : 1985  Age: 33 year old Sex: male  Client meets criteria for:  303.90 Alcohol Dependence  305.10 Nicotine Dependence    Dimension One: Acute Intoxication/Withdrawal Potential     Ratin  (Consider the client's ability to cope with withdrawal symptoms and current state of intoxication)       [facility] on 10/16/2018.  Client reported his substance of use as Alcohol. Client continued to report his last date of use as 2018. Upon transition patient denied any withdrawal symptoms that would interfere with full participation in the recovery lifestyle.     Dimension Two: Biomedical Condition and Complications    Ratin  (Consider the degree to which any physical disorder would interfere with treatment for substance abuse, and the client's ability to tolerate any related discomfort; determine the impact of continued chemical use on the unborn child if the client is pregnant)       Client reported having child birth Celiac Disease physical health diagnoses. Client reported that his condition is under control so long as he watches his diet. Client reported have a primary care provider. Client reported taking his medications as prescribed. Client has access to his medical facility as needed.      Dimension Three: Emotional/Behavioral/Cognitive Conditions & Complications  Ratin  (Determine the degree to which any condition or complications are likely to interfere with treatment for substance abuse or with functioning in significant life areas and the likelihood of risk of harm to self or others)   Client reported DAIANA and MDD diagnosis. Client currently sees a staff psychotherapist, KRISS Narvaez at [facility].   "Client completed his mental health diagnosis by staff counselor Brock Lopez MA, , [facility].  Client completed a suicidal risk screening and rated as \"low risk\". Client denies SI/HI.  Client is encouraged to continue to see his mental health therapy to work on increasing his self-esteem and a reducing his negative sense of self by replacing his negative self-talk with positive self-affirmations.    Dimension Four: Treatment Acceptance/Resistance     Ratin  (Consider the amount of support and encouragement necessary to keep the client involved in treatment)   Upon admissions patient reported his primary motivation for recovery was to allow himself time to heal. Upon completion of LP, client demonstrated having high levels of motivation for recovery.  However, client appeared to show complacency in his recovery lifestyle as evidenced by limited sober time and multiple past relapses. Client focused on identifying his past use patterns in order to identify the consequences that accompanied them.  Client was able to identify his values that were violated due to his use in order to increase his motivation to remain sober. Client is able  to express the positive events that occurred throughout the day as well as the events that negatively impacted him to identify the role that he may have played.     Dimension Five: Continued Use/Relaspe Prevention     Rating:  3  (Consider the degree to which the client's recognizes relapse issues and has the skills to prevent relapse of either substance use or mental health problems)     Client appeared to be at high risk for relapse due to limited periods of sobriety and multiple past relapses. It would benefit client to be able to focus on identifying the differences between a trigger and a warning sign, and then work to develop coping skills focused on reducing the impact on triggers. It would benefit client to continue to identify his motivation to remain sober and then working " to identify barriers that may exist to this goal.    Dimension Six: Recovery Environment     Ratin  (Consider the degree to which key areas of the client's life are supportive of or antagonistic to treatment participation and recovery)       Client is currently unemployed and admits to not having structured schedule. To further increase a healthy recovery environment client will atttend a minimum of three AA/NA meetings per wee.  Client will continue to focus on developing a sober network that may continue to expand following treatment. Client reported having relationship conflict with friends and family due to his use.      I have reviewed the information on the assessment, psychosocial and medical history and checklist:        it is current

## 2018-10-21 NOTE — ADDENDUM NOTE
Encounter addended by: Abelardo Kaur on: 10/21/2018  1:27 PM<BR>     Actions taken: Pend clinical note, Sign clinical note

## 2018-10-22 ENCOUNTER — BEH TREATMENT PLAN (OUTPATIENT)
Dept: BEHAVIORAL HEALTH | Facility: CLINIC | Age: 33
End: 2018-10-22

## 2018-10-22 ENCOUNTER — HOSPITAL ENCOUNTER (OUTPATIENT)
Dept: BEHAVIORAL HEALTH | Facility: CLINIC | Age: 33
End: 2018-10-22
Attending: SOCIAL WORKER
Payer: COMMERCIAL

## 2018-10-22 ENCOUNTER — HOSPITAL ENCOUNTER (OUTPATIENT)
Dept: BEHAVIORAL HEALTH | Facility: CLINIC | Age: 33
End: 2018-10-22
Attending: FAMILY MEDICINE
Payer: COMMERCIAL

## 2018-10-22 PROCEDURE — H2035 A/D TX PROGRAM, PER HOUR: HCPCS | Mod: HQ

## 2018-10-22 PROCEDURE — H2035 A/D TX PROGRAM, PER HOUR: HCPCS

## 2018-10-22 NOTE — ADDENDUM NOTE
Encounter addended by: Suzi Juares [facility] on: 10/22/2018 11:59 AM<BR>     Actions taken: Sign clinical note

## 2018-10-22 NOTE — PROGRESS NOTES
[facility]  Adult Chemical Dependency Program  Treatment Plan Requirements    These services are provided by the facility for each patient/client according to the individual's treatment plan:    Individual and group counseling    Education    Transition services    Services to address any co-occurring mental illness    Service coordination    Initial Treatment Plan Goals:  1. Complete all the requirements of Program Orientation.  2. Maintain medication compliance throughout the program.  3. Complete requirements for workshop/skills groups based on identified issues on your problem list.  4. Complete the support group attendance feedback sheet weekly.  5. Gain family involvement in treatment process to address family issues from the problem list.  6. Attend and participate in all required groups per individual treatment plan.  7. Focus attention to individualized issues from the treatment plan.  8. Complete all requirements for UA's, alcohol screening tests and other testing.  9. Schedule a physical examination if recommended.    In addition to the above, complete all individual goals as specifically outlines on your treatment plan.    Criteria for discharge:  Patients/clients are discharged from the program following completion of the entire program including Phase I and II or acceptance of other post-treatment referrals such as [facility] house, or aftercare at other facilities.  Patients/clients may also be discharged for inappropriate behavior or chemical use.      Favorable Discharge - Patients/clients have completed agreed upon treatment goals, understand their diagnosis and appear motivated about the follow-up care.    Guarded Discharge - Patients/clients have demonstrated some understanding of their diagnosis and recovery process, and have completed some of their treatment goals.  This prognosis also includes patients/clients who have completed some treatment goals but have not made  commitment to community support or follow through with referrals.    Unfavorable Discharge - Patients/clients have not completed agreed upon treatment goals due to their own choice, have limited understanding of their diagnosis, and have shown minimal or inconsistent behavior conducive to recovery.  Those patients/clients discharged due to behavioral problems will also be unfavorable discharges.    Adult CD Treatment Plan                                Glenn Simons  [id number]  1985  33 year old   male  Acute Intoxication/Withdrawal Potential     DIMENSION 1  RISK FACTOR: 0     SUBSTANCE USE DISORDERS:   303.90 (F10.20) Alcohol Use Disorder Severe  305.10 (F17.200)  Tobacco Use Disorder Moderate           Date Assigned Source Area of Treatment Focus / Goal / Treatment Strategies    Target  Date Initials Outcome Date Completed   10/22/2018 Self -  Current and Assessment -  Current  Area of Treatment Focus:  Substance use, cravings and urges. Last use date was reported as 09/11/18 for alcohol.       Goal:  Develop effective strategies to maintain sobriety.    Treatment Strategies:  Report to counselor and group any alcohol or drug use. Weekly, until approx. D/C date of   2/19/19   [place] Effective 3.6.2019       Biomedical Conditions and Complaints     DIMENSION 2  RISK FACTOR: 0             Date Assigned Source Area of Treatment Focus / Goal / Treatment Strategies Target  Date Initials Outcome Date Completed   10/22/2018 Self -  Deferred and Assessment -  Deferred  Area of Treatment Focus:  Client has a medical diagnosis of Celiac Disease.    Goal:  Disclose CD status to medical providers and follow up with medical interventions while in DOP.    Treatment Strategies:  Take medications as prescribed and follow-up with medical interventions while in the program. Weekly, until approx. D/C date of   2/19/19   [place] Effective 3.6.2019       Emotional/Behavioral/Cognitive Conditions and Complications     DIMENSION 3  RISK  "FACTOR: 1             Date Assigned Source Area of Treatment Focus / Goal / Treatment Strategies Target  Date Initials Outcome Date Completed     10/22/2018 Self -  Referred and Assessment -  Referred/current  Area of Treatment Focus:   Client reports a diagnosis of Major Depressive Disorder and Generalized Anxiety Disorder.       Goal:  Increase awareness and understanding of the relationship between mental health and substance use.       Treatment Strategies:  1. Client will journal daily on recovery.  Client will report progress, insights, challenges, successes, etc. in group weekly.  2. Client will complete \"Learning to Self-Soothe\" and share what he learned in group.   3. Client will read through \"Thinking Traps\" and identify his top three thinking traps and share in group.   4. Client will complete packet \"Self-Esteem\" and share with the group.  5. Client will complete \"Realistic Thinking\" assignment and present it to the group.  6. Client will read and complete packet titled \"Understanding Depression and Addiction\", and then share with the group.                      02/19/18    11/07/18    10/29/18    11/05/18    10/29/18    11/13/18 [place] Effective                     3.6.2019 11/07/18 11/07/18 12/17/18 11/21/18 11.13.2018        Readiness to Change     DIMENSION 4  RISK FACTOR: 0             Date Assigned Source Area of Treatment Focus / Goal / Treatment Strategies Target  Date Initials Outcome Date Completed   10/22/2018 Self -  Current and Assessment -  Current  Area of Treatment Focus:  Client has a history of relapse and a lack of supportive friendships.     Goal:  Increase awareness with how substance use is conflicted with personal values and address any ambivalence to change.       Treatment Strategies:  1. Complete \"What would my ideal life look like?\" assignment.  Share with group.  2. Client will complete \"5-5-3 Values, Consequences & Insane Behaviors with drug use history\" assignment " "and share with group.                   11/07/18    10/31/18 [facility] Effective Completed overall on 12/17/18              12/17/18    10/23/18        Relapse/Continues Use/Continues Problem Potential     DIMENSION 5  RISK FACTOR: 3                 Date Assigned Source Area of Treatment Focus / Goal / Treatment Strategies Target  Date Initials Outcome Date Completed   10/22/2018 Self -  Current and Assessment -  Current  Area of Treatment Focus:  Client lacks insight into his relapse process along with early warning signs and triggers.      Goal:  Increase awareness of the disease concept of addiction and insight into personal relapse process, triggers and strategies to address.      Treatment Strategies:  1. Client will complete the assignment \"5 Years Using Versus 5 Years Sober\" assignment and share with the group.   2. Client will complete \"Identifying Relapse Triggers and Cues\" and present to the group.                     10/30/18    11/14/18 [place] Effective                     11/07/18 11.14.2018        Recovery Environment     DIMENSION 6  RISK FACTOR: 2                 Date Assigned Source Area of Treatment Focus / Goal / Treatment Strategies Target  Date Initials Outcome Date Completed   10/22/2018 Self -  Current and Assessment -  Current  Area of Treatment Focus:  Client lacks a sober support network and involvement in sober activities.      Goal:  Client identified a goal of expanding sober network, getting involved in sober activities and obtaining a job.    Treatment Strategies:  1. Attend 2-3 sober support group meetings weekly and work on meeting people at the meetings to expand sober support network.  2. Read packet titled \"Reaching Out\" then write one page on the insight gained and then share with the group.  3. Complete the \"My Recovery Care Plan\" assignment and present to the group.         Last week of treatmentapprox date,  2/19/19 11/12/18 12/12/18 [place] Effective "                 3.6.2019      11.12.2018    3.6.2019     Individual abuse prevention plan (required for lodging plus) : specific actions, referral:   No additional protection measures required other than the Program Abuse Prevention Plan - No      All interventions that are designated as current will need to be completed in order to transition out of treatment with a favorable prognosis. The treatment plan is a flexible document and a work in progress. Interventions and goals may be added at any time to customize this plan to each individual's needs. Client may work with clinician to change interventions as long as they pertain to the goals stipulated in the plan and/or are clinically driven.  Abelardo Kaur MA, KRISS, ROSEMARY    Acknowledgement of Current Treatment Plan - Initial Treatment Plan     INITIAL TREATMENT PLAN:     1. I have participated in creating my treatment plan with my therapist / counselor on _10/22/2018__.     I agree with the plan as it is written in the electronic health record.    Name Signature/Date   Client: Glenn Simons     Name of Therapist / Counselor Signature/Date   Counselor/Therapist:    Abelardo Kaur MA, LMFT, [facility]      2. I have completed and reviewed my Safety Plan with my counselor and signed this on __completed and signed 09/15/18 while in + __. I have been given the hard copy of this plan.    Client signature/date:      _____________________________________________________________________________    3. Last Use Date: __09/11/18__    Client signature/date:     _____________________________________________________________________________

## 2018-10-23 ENCOUNTER — HOSPITAL ENCOUNTER (OUTPATIENT)
Dept: BEHAVIORAL HEALTH | Facility: CLINIC | Age: 33
End: 2018-10-23
Attending: FAMILY MEDICINE
Payer: COMMERCIAL

## 2018-10-23 PROCEDURE — H2035 A/D TX PROGRAM, PER HOUR: HCPCS | Mod: HQ

## 2018-10-24 ENCOUNTER — HOSPITAL ENCOUNTER (OUTPATIENT)
Dept: BEHAVIORAL HEALTH | Facility: CLINIC | Age: 33
End: 2018-10-24
Attending: FAMILY MEDICINE
Payer: COMMERCIAL

## 2018-10-24 PROCEDURE — H2035 A/D TX PROGRAM, PER HOUR: HCPCS | Mod: HQ

## 2018-10-24 NOTE — PROGRESS NOTES
"Key to reading dimensions:   -Bolded text in a dimension indicates information about a client at service initiation.  -Underlined portions in a dimension indicate important information that writer is carrying forward from week to week as a reminder.    CD ADULT Progress Note     Treatment Plan Review completed on:  10/24/2018     Attendance Dates: 10/22/18, 10/23/18, & 10/24/2018.      Total # of P1 Group Sessions: 4  Total # of P2 Group Sessions: 0  Total # of P3 Group Sessions: 0  Total # of 1:1 Sessions: 1     Length of stay/projected discharge date: 01/15/19.      MONDAY TUESDAY WEDNESDAY THURSDAY FRIDAY SATURDAY SUNDAY TOTAL HOURS   Group Therapy  3 3 3     9   Specialty Groups*            1:1            Family Program           [place]             Phase III             Absent (place \"X\")             Total's 3 3 3     9     *Specialty Groups include Mental Health Care, Assertiveness and Communication, Sobriety Maintenance Skills, Spiritual Care, Stress Management, Relapse Prevention, Family Systems.                             Learning Style:    Visual  Hands on  Verbal  Demonstration    Staff member contributing: Abelardo Kaur MA, LMFT, [facility]     Received supervision: No.    Client contributed to goals and plan: Yes    Client received a signed copy of treatment plan/revised plan:  Yes     Changes to Treatment Plan: No    Client agrees with plan/revised plan:  Yes     Any changes in Vulnerable Adult Status:  No    1) Care Coordination Activities: No.  2) Medical, Mental Health and other appointments the client attended: SEE DIM's II & III below.  3) Medication issues: No None.  4) Physical and mental health problems: SEE DIM's II & III below.  5) Review and evaluation of the individual abuse prevention plan: N/A     Substance Use Disorders:    303.90 (F10.20) Alcohol Use Disorder Severe    ASAM Risk Ratings and Data     DIMENSION 1: Acute Intoxication/Withdrawal  The client's ability to cope with withdrawal " "symptoms and current state of intoxication     Acute Intoxication/Withdrawal - Current Risk Factor:  0    Reporting a sober date of: 09/11/18 for alcohol.    Goals:  Develop effective strategies to maintain sobriety. Report any substance or alcohol use to both counselor and the group.     Data: Client denied nor demonstrated any signs/symptomology of intoxication and/or withdrawal. Client denied having any symptoms of PAW. Client confirmed his last use date was on 09/10/18 for alcohol.     DIMENSION 2:  Biomedical Conditions and Complaints  The degree to which any physical disorder would interfere with treatment for substance abuse and the client's ability to tolerate any related discomfort     Biomedical Conditions and Complaints - Current Risk Factor:  1    Goals: Disclose CD status to medical providers and follow up with medical interventions while in DOP.     Data: Client rated his overall health on a scale of 1-10 (1=poor, 10=excellent): 8. Client reports doing the following for self-care during the past week; \"taking medications as prescribed.\" Client reports the following changes to his physical health over the past week; \"None\". Client reports the following upcoming doctor s appointments: November 5th at 2:00PM for check-up.     > Client reports the following physical health conditions;  Past Medical History:   Diagnosis Date     ACD (adult celiac disease)      Celiac disease        DIMENSION 3:  Emotional/Behavioral/Cognitive Conditions and Complications  The degree to which any condition or complications are likely to interfere with treatment for substance abuse or with function in significant life areas and the likelihood of risk of harm to self or others.     Emotional/Behavioral - Current Risk Factor:  1    DSM-5 Diagnoses:   Official diagnosis (es) per Luis Armando Lopez MA, LP, [facility] on 10/08/18;   > 296.32 (F33.1) Major Depressive Disorder, Recurrent Episode, Moderate   > Client reports experiencing; "   1. Depressed mood most of the day, nearly every day, as indicated by either subjective report (e.g., feels sad, empty, hopeless) or observation made by others (e.g., appears tearful). (Note: In children and adolescents, can be irritable mood.)  2. Markedly diminished interest or pleasure in all, or almost all, activities most of the day, nearly every day (as indicated by either subjective account or observation).  3. Significant weight loss when not dieting or weight gain (e.g., a change of more than 5% of body weight in a month), or decrease or increase in appetite nearly every day. (Note: In children, consider failure to make expected weight gain.)  4. Insomnia or hypersomnia nearly every day.  5. Fatigue or loss of energy nearly every day.  6. Feelings of worthlessness or excessive or inappropriate guilt (which may be delusional) nearly every day (not merely self-reproach or guilt about being sick).  7. Diminished ability to think or concentrate, or indecisiveness, nearly every day (either by subjective account or as observed by others).  8. Recurrent thoughts of death (not just fear of dying), recurrent suicidal ideation without a specific plan, or a suicide attempt or a specific plan for committing suicide.    > 300.02 (F41.1) Generalized Anxiety Disorder   9. Client has had excessive anxiety and worry (apprehensive expectation), occurring more days than not for at least 6 months, about a number of events or activities (such as work or school performance).   10. The Client finds it difficult to control the worry.   11. The anxiety and worry are associated with three (or more) of the following six symptoms (with at least some symptoms having been present for more days than not for the past 6 months): (Client reports having five of the six)  1. Restlessness or feeling keyed up or on edge.  2. Being easily fatigued.  3. Difficulty concentrating or mind going blank.  4. Irritability.  5. Sleep disturbance  "(difficulty falling or staying asleep, or restless, unsatisfying sleep).  1. Client reports the anxiety, worry, or physical symptoms have caused clinically significant distress or impairment in social, occupational, or other important areas of functioning.    Suicide Assessment:   Risk Status    Ideation - Active thoughts of suicide Intent to follow through on suicide Plan for completing suicide    Yes No Yes No Yes No   Emergent  x  x  x   Urgent / Non-Emergent  x  x  x   Non- Urgent  x  x  x   No Current/Active Risk  x  x  x    Guns in the home?: No.     Goals: Understand the relationship between mental health concerns and addiction.     Data: Client reports feeling the following two emotions today; \"anxious and partially fearful.\" Client rated the following MH symptoms on a scale of 1-10 (0=did not endorse): sleeplessness- 4, fatigue- 3, difficulty concentrating- 2, mood swings- 4, irritability- 4, sadness- 2, excessive sleep- 3, racing thoughts- 5, hopelessness- 3. Client denied suicidal ideation or self-injurious behavior.    Client reports the following appointment with his psychiatrist; Friday, October 26th at 2PM.     > Client reports taking the following psychotropic medications; Hydroxyzine and Lexapro.    >Client reports his risk factors are; \"MH diagnosis with lack of historical treatment of MI, early recovery, significant physical health concern (Celiacs disease), and unemployment\".    >Client reports his protective factors are; \"sense of responsibility to family, motivation towards receiving treatment, and sober support group participation\".     DIMENSION 4:  Readiness to Change  Consider the amount of support and encouragement necessary to keep the client involved in treatment.     Readiness to Change - Current Risk Factor:  0    Goals:  Increase awareness with how substance use is conflicted with personal values and address any ambivalence to change.     Data: Client rated his motivation of the week " "on a scale of 1-10 (1=low, 10= high): 10. Client reports his main motivation for sobriety is \"my health and to better myself\". Client reports \"walking by the liquor store\" is what blocks motivation for sobriety for him. Client reports using the following coping strategies that are helpful for his continued sobriety; \"listening to the radio.\"     >10/23 Client completed and presented the the group his assignment titled \"5-5-3 Values Violated, Consequences of Use and Insane Behaviors with Drug Use History\".      DIMENSION 5:  Relapse/Continued Use/Continued Problem Potential  Consider the degree to which the client recognizes relapse issues and has the skills to prevent relapse of either substance use or mental health problems.     Relapse/Continued Use/Continued Problem Potential - Current Risk Factor:  3    Goals:  Increase awareness of the disease concept of addiction and insight into personal relapse triggers and strategies to address.     Data: Client rated his \"Average Craving Rating\" on a scale of 1-10 (1=low, 10= high): 3. Client reports that the following things helped minimize his cravings this past week; \"staying busy and distraction.\" Client reports that \"walking past a liquor store\" was his biggest trigger for the week.     DIMENSION 6:  Recovery Environment  Consider the degree to which key areas of the client's life are supportive of or antagonistic to treatment participation and recovery.     Recovery Environment - Current Risk Factor: 2    Support group meetings attended this week: 6  Where: [facility], [facility] and in [place].  If zero attendance, what s preventing you: N/A.    Do you currently have a sponsor: No  Did you have contact with your sponsor this week? N/A    Did family agree to attend family week:  NA    If yes: NA    Goals:  Increase sober support network and work on obtaining employment.     Data: Client rated their \"Living Situation\" on a scale of 1-10 (1=very " "helpful, 10=very stressful): 6 due to \"stress related to money and food being tight\". Client reports during the past week that he \"attended all types of different support group meetings\" as a way to expand his sober support network. Client reports he enjoyed, \"Went to nicotine anonymous meeting this past week\" for sober fun activities this past week.     >Client reports that he and his wife live in an on campus apartment at [facility] where is wife attends school. Client reports everything is supportive.  Employment: Client reports he is unemployed.  Volunteerism: No.     Intervention: Counselor/therapist used Behavioral Therapy, Cognitive Behavioral Therapy, Counselor Feedback, Education, Emotional management, Group Feedback, Mindfulness, Motivational Enhancement Therapy, Relapse Prevention, Twelve Step Facilitation, DBT and REBT.  >Client practiced the following skills in group session this week; mindfulness based stress reduction skills, along with breathing exercises. Writer provided client with verbal interventions including: validation, support, and psycho-education.    >Client learned and practiced the following skills in group sessions this week; Client learned about \"Tips for Avoiding Relapse\", general overview and roots of mindfulness meditation, how mindfulness works and steps to practice mindfulness, differences between stress, eustress, and distress, and part one of the \"Neurobiology of Addiction\" this week as well as how it all correlates with ones recovery program. Client also learned how to identify positive stressors versus negative ones and when dealing with negative stressors, various tools/skills to avoid relapse. Client identified what situations tempt him to want use and what skills he will use to avoid relapse.     >10/23 Client completed and presented the the group his assignment titled \"5-5-3 Values Violated, Consequences of Use and Insane Behaviors with Drug Use History\". " "      Assessment:    Stages of Change Model: Client is currently in the Action Stage.  >Client was observed using the body scan and reported it helped with relaxation and anxiety. Client was observed using breathing exercises that were covered again during this weeks group session and client reported it helped with relaxation. Client appeared very open to continued learning for lifelong sobriety.    >Client was able to \"teach back\" the skills he learned on avoiding relapse, roots of mindfulness, how mindfulness works, differences between stress, eustress, and distress, as well as some of the key areas of the brain that are directly impacted with addiction. Client was able to do a role play exercise with fellow group members regarding practicing how to identify their triggers to use, and identifying if they are experiencing eustress or distress as well as what to they can do when felling stress or triggered to use. Client appeared to gain insight into ways to avoid negative stress, identifying when stress is negative, the Nucleus Accumbens and other brain areas that are related to addiction as well as gaining insight into the situations that cause him to want to use.     Plan:   -Continue to attend 2-3 sober support group meetings each week (this includes non-12-step/AA alternative meetings).  -Client will meet with his therapist once per week.  -Client will continue to work on treatment plan objectives.  -Client will follow all recommendations of counselor and any other medical provider which includes taking all medications as prescribed.    -Client will attend all weekly Phase 1 group sessions.   -Client to engage in sober activities each week.  -Client will spend at least an hour per day looking for a job as client identified this as a goal for him on his treatment plan.      Abelardo Kaur MA, LMFT, [facility]  "

## 2018-10-27 NOTE — ADDENDUM NOTE
Encounter addended by: Abelardo Kaur on: 10/27/2018  3:40 PM<BR>     Actions taken: Pend clinical note

## 2018-10-28 NOTE — ADDENDUM NOTE
Encounter addended by: Abelardo Kaur on: 10/28/2018 10:12 AM<BR>     Actions taken: Sign clinical note

## 2018-10-28 NOTE — ADDENDUM NOTE
Encounter addended by: Abelardo Kaur on: 10/28/2018  7:30 AM<BR>     Actions taken: Pend clinical note, Sign clinical note

## 2018-10-29 ENCOUNTER — HOSPITAL ENCOUNTER (OUTPATIENT)
Dept: BEHAVIORAL HEALTH | Facility: CLINIC | Age: 33
End: 2018-10-29
Attending: FAMILY MEDICINE
Payer: COMMERCIAL

## 2018-10-29 PROCEDURE — H2035 A/D TX PROGRAM, PER HOUR: HCPCS | Mod: HQ

## 2018-10-30 ENCOUNTER — HOSPITAL ENCOUNTER (OUTPATIENT)
Dept: BEHAVIORAL HEALTH | Facility: CLINIC | Age: 33
End: 2018-10-30
Attending: FAMILY MEDICINE
Payer: COMMERCIAL

## 2018-10-30 PROCEDURE — H2035 A/D TX PROGRAM, PER HOUR: HCPCS | Mod: HQ

## 2018-10-31 ENCOUNTER — HOSPITAL ENCOUNTER (OUTPATIENT)
Dept: BEHAVIORAL HEALTH | Facility: CLINIC | Age: 33
End: 2018-10-31
Attending: FAMILY MEDICINE
Payer: COMMERCIAL

## 2018-10-31 PROCEDURE — H2035 A/D TX PROGRAM, PER HOUR: HCPCS | Mod: HQ

## 2018-11-01 NOTE — PROGRESS NOTES
"Key to reading dimensions:   -Bolded text in a dimension indicates information about a client at service initiation.  -Underlined portions in a dimension indicate important information that writer is carrying forward from week to week as a reminder.    CD ADULT Progress Note     Treatment Plan Review completed on:  10/31/2018     Attendance Dates: 10/29/18, 10/30/18, & 10/31/2018.      Total # of P1 Group Sessions: 7  Total # of P2 Group Sessions: 0  Total # of P3 Group Sessions: 0  Total # of 1:1 Sessions: 1     Length of stay/projected discharge date: 01/15/19.      MONDAY TUESDAY WEDNESDAY THURSDAY FRIDAY SATURDAY SUNDAY TOTAL HOURS   Group Therapy  3 3 3     9   Specialty Groups*            1:1            Family Program           [place]             Phase III             Absent (place \"X\")             Total's 3 3 3     9     *Specialty Groups include Mental Health Care, Assertiveness and Communication, Sobriety Maintenance Skills, Spiritual Care, Stress Management, Relapse Prevention, Family Systems.                             Learning Style:    Visual  Hands on  Verbal  Demonstration    Staff member contributing: Abelardo Kaur MA, LMFT, [facility]     Received supervision: No.    Client contributed to goals and plan: Yes    Client received a signed copy of treatment plan/revised plan:  Yes     Changes to Treatment Plan: No    Client agrees with plan/revised plan:  Yes     Any changes in Vulnerable Adult Status:  No    1) Care Coordination Activities: No.  2) Medical, Mental Health and other appointments the client attended: SEE DIM's II & III below.  3) Medication issues: No None.  4) Physical and mental health problems: SEE DIM's II & III below.  5) Review and evaluation of the individual abuse prevention plan: N/A     Substance Use Disorders:    303.90 (F10.20) Alcohol Use Disorder Severe    ASAM Risk Ratings and Data     DIMENSION 1: Acute Intoxication/Withdrawal  The client's ability to cope with withdrawal " "symptoms and current state of intoxication     Acute Intoxication/Withdrawal - Current Risk Factor:  0    Reporting a sober date of: 09/11/18 for alcohol.    Goals:  Develop effective strategies to maintain sobriety. Report any substance or alcohol use to both counselor and the group.     Data: Client denied nor demonstrated any signs/symptomology of intoxication and/or withdrawal. Client denied having any symptoms of PAW. Client confirmed his last use date was on 09/10/18 for alcohol.     DIMENSION 2:  Biomedical Conditions and Complaints  The degree to which any physical disorder would interfere with treatment for substance abuse and the client's ability to tolerate any related discomfort     Biomedical Conditions and Complaints - Current Risk Factor:  1    Goals: Disclose CD status to medical providers and follow up with medical interventions while in DOP.     Data: Client rated his overall health on a scale of 1-10 (1=poor, 10=excellent): 8. Client reports doing the following for self-care during the past week; \"getting up and out to do something everyday.\" Client reports the following changes to his physical health over the past week; \"None\". Client reports the following upcoming doctor s appointments: November 5th at 2:00PM for check-up.     > Client reports the following physical health conditions;  Past Medical History:   Diagnosis Date     ACD (adult celiac disease)      Celiac disease        DIMENSION 3:  Emotional/Behavioral/Cognitive Conditions and Complications  The degree to which any condition or complications are likely to interfere with treatment for substance abuse or with function in significant life areas and the likelihood of risk of harm to self or others.     Emotional/Behavioral - Current Risk Factor:  1    DSM-5 Diagnoses:   Official diagnosis (es) per [facility], [facility] on [facility];   > 296.32 (F33.1) Major Depressive Disorder, Recurrent Episode, Moderate   > Client reports " experiencing;   1. Depressed mood most of the day, nearly every day, as indicated by either subjective report (e.g., feels sad, empty, hopeless) or observation made by others (e.g., appears tearful). (Note: In children and adolescents, can be irritable mood.)  2. Markedly diminished interest or pleasure in all, or almost all, activities most of the day, nearly every day (as indicated by either subjective account or observation).  3. Significant weight loss when not dieting or weight gain (e.g., a change of more than 5% of body weight in a month), or decrease or increase in appetite nearly every day. (Note: In children, consider failure to make expected weight gain.)  4. Insomnia or hypersomnia nearly every day.  5. Fatigue or loss of energy nearly every day.  6. Feelings of worthlessness or excessive or inappropriate guilt (which may be delusional) nearly every day (not merely self-reproach or guilt about being sick).  7. Diminished ability to think or concentrate, or indecisiveness, nearly every day (either by subjective account or as observed by others).  8. Recurrent thoughts of death (not just fear of dying), recurrent suicidal ideation without a specific plan, or a suicide attempt or a specific plan for committing suicide.    > 300.02 (F41.1) Generalized Anxiety Disorder   9. Client has had excessive anxiety and worry (apprehensive expectation), occurring more days than not for at least 6 months, about a number of events or activities (such as work or school performance).   10. The Client finds it difficult to control the worry.   11. The anxiety and worry are associated with three (or more) of the following six symptoms (with at least some symptoms having been present for more days than not for the past 6 months): (Client reports having five of the six)  1. Restlessness or feeling keyed up or on edge.  2. Being easily fatigued.  3. Difficulty concentrating or mind going blank.  4. Irritability.  5. Sleep  "disturbance (difficulty falling or staying asleep, or restless, unsatisfying sleep).  1. Client reports the anxiety, worry, or physical symptoms have caused clinically significant distress or impairment in social, occupational, or other important areas of functioning.    Suicide Assessment:   Risk Status    Ideation - Active thoughts of suicide Intent to follow through on suicide Plan for completing suicide    Yes No Yes No Yes No   Emergent  x  x  x   Urgent / Non-Emergent  x  x  x   Non- Urgent  x  x  x   No Current/Active Risk  x  x  x    Guns in the home?: No.     Goals: Understand the relationship between mental health concerns and addiction.     Data: Client reports feeling the following two emotions today; \"hopeful and relieved.\" Client rated the following MH symptoms on a scale of 1-10 (0=did not endorse): sleeplessness- 1, fatigue- 3, difficulty concentrating- 2, mood swings- 1, irritability- 1, sadness- 2, excessive sleep- 2, racing thoughts- 4, hopelessness- 1. Client denied suicidal ideation or self-injurious behavior.    Client reports the following appointment with his psychiatrist; November 29th.     > Client reports taking the following psychotropic medications; Hydroxyzine and Lexapro.    >Client reports his risk factors are; \"MH diagnosis with lack of historical treatment of MI, early recovery, significant physical health concern (Celiacs disease), and unemployment\".    >Client reports his protective factors are; \"sense of responsibility to family, motivation towards receiving treatment, and sober support group participation\".     DIMENSION 4:  Readiness to Change  Consider the amount of support and encouragement necessary to keep the client involved in treatment.     Readiness to Change - Current Risk Factor:  0    Goals:  Increase awareness with how substance use is conflicted with personal values and address any ambivalence to change.     Data: Client rated his motivation of the week on a scale " "of 1-10 (1=low, 10= high): 8. Client reports his main motivation for sobriety is \"my health\". Client reports \"lossing my sobriety chip\" is what blocks motivation for sobriety for him. Client reports using the following coping strategies that are helpful for his continued sobriety; \"going to meetings.\"      DIMENSION 5:  Relapse/Continued Use/Continued Problem Potential  Consider the degree to which the client recognizes relapse issues and has the skills to prevent relapse of either substance use or mental health problems.     Relapse/Continued Use/Continued Problem Potential - Current Risk Factor:  2    Goals:  Increase awareness of the disease concept of addiction and insight into personal relapse triggers and strategies to address.     Data: Client rated his \"Average Craving Rating\" on a scale of 1-10 (1=low, 10= high): 3. Client reports that the following things helped minimize his cravings this past week; \"having planned activities.\" Client reports that \"having down time\" was his biggest trigger for the week.    DIMENSION 6:  Recovery Environment  Consider the degree to which key areas of the client's life are supportive of or antagonistic to treatment participation and recovery.     Recovery Environment - Current Risk Factor: 2    Support group meetings attended this week: 10  Where: [facility], [facility], [facility] and in [place].  If zero attendance, what s preventing you: N/A.    Do you currently have a sponsor: No  Did you have contact with your sponsor this week? N/A    Did family agree to attend family week:  NA    If yes: NA    Goals:  Increase sober support network and work on obtaining employment.     Data: Client rated their \"Living Situation\" on a scale of 1-10 (1=very helpful, 10=very stressful): 4 due to \"stress related to lack of money\". Client reports during the past week that he \"met new people at my meetings\" as a way to expand his sober support network. Client reports he enjoyed, " "\"went to a sober bonfire\" for sober fun activities this past week.     >Client reports that he and his wife live in an on campus apartment at [facility] where is wife attends school. Client reports everything is supportive.  Employment: Client reports he is unemployed.   >10/30 Client reports he has applied for some jobs and had a phone interview this past week.   Volunteerism: No.     Intervention: Counselor/therapist used Behavioral Therapy, Cognitive Behavioral Therapy, Counselor Feedback, Education, Emotional management, Group Feedback, Mindfulness, Motivational Enhancement Therapy, Relapse Prevention, Twelve Step Facilitation, DBT and REBT.  >Client practiced the following skills in group session this week; mindfulness based stress reduction skills, along with breathing exercises. Writer provided client with verbal interventions including: validation, support, and psycho-education.    >10/29 Group-  Goal of the Group: Client learned key concepts of traditional CBT (cognitive distortions, conditioning, automatic thoughts, reframing) and then build on these by learning three core concepts of third wave therapies (ACT, DBT, MB): Acceptance, Cognitive Defusion and Being Present. The objective is to increase psychological flexibility and choose behaviors that serve the clients  personal values.      I.  D3 Intervention and Group Topic addressed: Part 1:  3 Core processes to increase psychological flexibility and increase resiliency for mental and physical health.     II. Mindfulness and/or Motivational Component: Getting Comfortable with Moods, Exploring Affirmations to use this week, Liquid Mood meditation     III. Additional Activity:  Three Simple Steps to Acceptance; Using Affirmations to increase Self-Acceptance; Cognitive Defusion exercises      > 10/31 Group-   Goal of the Group: Client learned key concepts of traditional CBT (cognitive distortions, conditioning, automatic thoughts, reframing) " "and then build on these by learning three core concepts of third wave therapies (ACT, DBT, MB): Self-as-Context, Values and Committed Action. The objective is to increase psychological flexibility and choose behaviors that serve the clients  personal values.        II. Mindfulness and/or Motivational Component:  Values and Committed Action, Psychological Flexibility, Excerpts from documentary  I AM  to clarify values and emphasize the importance of community and connection to others.  Also participated in  awareness of sounds  mindfulness activity.     III. Additional Activity:  Completed examples of  auto-  behavior vs. values based actions, identified personal value to work on this week +  create a  goal that reflects this value + commit to one step toward that goal for this week.       Assessment:    Stages of Change Model: Client is currently in the Action Stage.  >Client was observed using the body scan and reported it helped with relaxation and anxiety. Client was observed using breathing exercises that were covered again during this weeks group session and client reported it helped with relaxation. Client appeared very open to continued learning for lifelong sobriety.    >Client was able to \"teach back\" the skills he learned on avoiding relapse, roots of mindfulness, how mindfulness works, differences between stress, eustress, and distress, as well as some of the key areas of the brain that are directly impacted with addiction. Client was able to do a role play exercise with fellow group members regarding practicing how to identify their triggers to use, and identifying if they are experiencing eustress or distress as well as what to they can do when felling stress or triggered to use. Client appeared to gain insight into ways to avoid negative stress, identifying when stress is negative, the Nucleus Accumbens and other brain areas that are related to addiction as well as gaining insight into the " situations that cause him to want to use.     Plan:   -Continue to attend 2-3 sober support group meetings each week (this includes non-12-step/AA alternative meetings).  -Client will meet with his therapist once per week.  -Client will continue to work on treatment plan objectives.  -Client will follow all recommendations of counselor and any other medical provider which includes taking all medications as prescribed.    -Client will attend all weekly Phase 1 group sessions.   -Client to engage in sober activities each week.  -Client will spend at least an hour per day looking for a job as client identified this as a goal for him on his treatment plan.      Abelardo Kaur MA, LMFT, [facility]

## 2018-11-05 ENCOUNTER — HOSPITAL ENCOUNTER (OUTPATIENT)
Dept: BEHAVIORAL HEALTH | Facility: CLINIC | Age: 33
End: 2018-11-05
Attending: FAMILY MEDICINE
Payer: COMMERCIAL

## 2018-11-05 PROCEDURE — H2035 A/D TX PROGRAM, PER HOUR: HCPCS | Mod: HQ

## 2018-11-06 ENCOUNTER — HOSPITAL ENCOUNTER (OUTPATIENT)
Dept: BEHAVIORAL HEALTH | Facility: CLINIC | Age: 33
End: 2018-11-06
Attending: FAMILY MEDICINE
Payer: COMMERCIAL

## 2018-11-06 PROCEDURE — H2035 A/D TX PROGRAM, PER HOUR: HCPCS | Mod: HQ

## 2018-11-06 NOTE — ADDENDUM NOTE
Encounter addended by: Abelardo Kaur on: 11/6/2018 11:24 AM<BR>     Actions taken: Pend clinical note, Sign clinical note

## 2018-11-07 ENCOUNTER — HOSPITAL ENCOUNTER (OUTPATIENT)
Dept: BEHAVIORAL HEALTH | Facility: CLINIC | Age: 33
End: 2018-11-07
Attending: FAMILY MEDICINE
Payer: COMMERCIAL

## 2018-11-07 PROCEDURE — H2035 A/D TX PROGRAM, PER HOUR: HCPCS | Mod: HQ

## 2018-11-08 NOTE — PROGRESS NOTES
"Key to reading dimensions:   -Bolded text in a dimension indicates information about a client at service initiation.  -Underlined portions in a dimension indicate important information that writer is carrying forward from week to week as a reminder.    CD ADULT Progress Note     Treatment Plan Review completed on:  11/7/2018     Attendance Dates: 11/5/18, 11/6/18, and 11/7/2018.     Total # of P1 Group Sessions: 10  Total # of P2 Group Sessions: 0  Total # of P3 Group Sessions: 0  Total # of 1:1 Sessions: 1     Length of stay/projected discharge date: 01/15/19.      MONDAY TUESDAY WEDNESDAY THURSDAY FRIDAY SATURDAY SUNDAY TOTAL HOURS   Group Therapy  3 3 3     9   Specialty Groups*            1:1            Family Program           [place]             Phase III             Absent (place \"X\")             Total's 3 3 3     9     *Specialty Groups include Mental Health Care, Assertiveness and Communication, Sobriety Maintenance Skills, Spiritual Care, Stress Management, Relapse Prevention, Family Systems.                             Learning Style:    Visual  Hands on  Verbal  Demonstration    Staff member contributing: Abelardo Kaur MA, LMFT, [facility]     Received supervision: No.    Client contributed to goals and plan: Yes    Client received a signed copy of treatment plan/revised plan:  Yes     Changes to Treatment Plan: No    Client agrees with plan/revised plan:  Yes     Any changes in Vulnerable Adult Status:  No    1) Care Coordination Activities: No.  2) Medical, Mental Health and other appointments the client attended: SEE DIM's II & III below.  3) Medication issues: No None.  4) Physical and mental health problems: SEE DIM's II & III below.  5) Review and evaluation of the individual abuse prevention plan: N/A     Substance Use Disorders:    303.90 (F10.20) Alcohol Use Disorder Severe    ASAM Risk Ratings and Data     DIMENSION 1: Acute Intoxication/Withdrawal  The client's ability to cope with withdrawal " "symptoms and current state of intoxication     Acute Intoxication/Withdrawal - Current Risk Factor:  0    Reporting a sober date of: 09/11/18 for alcohol.    Goals:  Develop effective strategies to maintain sobriety. Report any substance or alcohol use to both counselor and the group.     Data: Client denied nor demonstrated any signs/symptomology of intoxication and/or withdrawal. Client denied having any symptoms of PAW. Client confirmed his last use date was on 09/10/18 for alcohol.     DIMENSION 2:  Biomedical Conditions and Complaints  The degree to which any physical disorder would interfere with treatment for substance abuse and the client's ability to tolerate any related discomfort     Biomedical Conditions and Complaints - Current Risk Factor:  1    Goals: Disclose CD status to medical providers and follow up with medical interventions while in DOP.     Data: Client rated his overall health on a scale of 1-10 (1=poor, 10=excellent): 8. Client reports doing the following for self-care during the past week; \"getting to meetings.\" Client reports the following changes to his physical health over the past week; \"my BP has been down lately\". Client reports the following upcoming doctor s appointments: Was on November 5th at 2:00PM for check-up.     > Client reports the following physical health conditions;  Past Medical History:   Diagnosis Date     ACD (adult celiac disease)      Celiac disease      > Client reports the taking the following medications:  Current Outpatient Prescriptions   Medication     amLODIPine (NORVASC) 5 MG tablet     escitalopram (LEXAPRO) 10 MG tablet     hydrOXYzine (ATARAX) 25 MG tablet     MIRTAZAPINE PO     multivitamin, therapeutic with minerals (THERA-VIT-M) TABS tablet     nicotine (NICODERM CQ) 14 MG/24HR 24 hr patch     nicotine (NICODERM CQ) 7 MG/24HR 24 hr patch     thiamine 100 MG tablet     DIMENSION 3:  Emotional/Behavioral/Cognitive Conditions and Complications  The degree " to which any condition or complications are likely to interfere with treatment for substance abuse or with function in significant life areas and the likelihood of risk of harm to self or others.     Emotional/Behavioral - Current Risk Factor:  1    DSM-5 Diagnoses:   Official diagnosis (es) per Luis Armando Lopez MA, [facility], [facility] on 10/08/18;   > 296.32 (F33.1) Major Depressive Disorder, Recurrent Episode, Moderate   > Client reports experiencing;   1. Depressed mood most of the day, nearly every day, as indicated by either subjective report (e.g., feels sad, empty, hopeless) or observation made by others (e.g., appears tearful). (Note: In children and adolescents, can be irritable mood.)  2. Markedly diminished interest or pleasure in all, or almost all, activities most of the day, nearly every day (as indicated by either subjective account or observation).  3. Significant weight loss when not dieting or weight gain (e.g., a change of more than 5% of body weight in a month), or decrease or increase in appetite nearly every day. (Note: In children, consider failure to make expected weight gain.)  4. Insomnia or hypersomnia nearly every day.  5. Fatigue or loss of energy nearly every day.  6. Feelings of worthlessness or excessive or inappropriate guilt (which may be delusional) nearly every day (not merely self-reproach or guilt about being sick).  7. Diminished ability to think or concentrate, or indecisiveness, nearly every day (either by subjective account or as observed by others).  8. Recurrent thoughts of death (not just fear of dying), recurrent suicidal ideation without a specific plan, or a suicide attempt or a specific plan for committing suicide.    > 300.02 (F41.1) Generalized Anxiety Disorder   9. Client has had excessive anxiety and worry (apprehensive expectation), occurring more days than not for at least 6 months, about a number of events or activities (such as work or school performance).   10. The  "Client finds it difficult to control the worry.   11. The anxiety and worry are associated with three (or more) of the following six symptoms (with at least some symptoms having been present for more days than not for the past 6 months): (Client reports having five of the six)  1. Restlessness or feeling keyed up or on edge.  2. Being easily fatigued.  3. Difficulty concentrating or mind going blank.  4. Irritability.  5. Sleep disturbance (difficulty falling or staying asleep, or restless, unsatisfying sleep).  1. Client reports the anxiety, worry, or physical symptoms have caused clinically significant distress or impairment in social, occupational, or other important areas of functioning.    Suicide Assessment:   Risk Status    Ideation - Active thoughts of suicide Intent to follow through on suicide Plan for completing suicide    Yes No Yes No Yes No   Emergent  x  x  x   Urgent / Non-Emergent  x  x  x   Non- Urgent  x  x  x   No Current/Active Risk  x  x  x    Guns in the home?: No.     Goals: Understand the relationship between mental health concerns and addiction.     Data: Client reports feeling the following two emotions today; \"sad.\" Client rated the following MH symptoms on a scale of 1-10 (0=did not endorse): sleeplessness- 1, fatigue- 2, difficulty concentrating- 2, mood swings- 2, irritability- 4, sadness- 3, excessive sleep- 3, racing thoughts- 3, hopelessness- 3. Client denied suicidal ideation or self-injurious behavior.    > Client reports the following appointment with his psychiatrist; November 29th.     > Client reports taking the following psychotropic medications; Hydroxyzine and Lexapro.    >Client reports his risk factors are; \"MH diagnosis with lack of historical treatment of MI, early recovery, significant physical health concern (Celiacs disease), and unemployment\".    >Client reports his protective factors are; \"sense of responsibility to family, motivation towards receiving treatment, " "and sober support group participation\".     DIMENSION 4:  Readiness to Change  Consider the amount of support and encouragement necessary to keep the client involved in treatment.     Readiness to Change - Current Risk Factor:  0    Goals:  Increase awareness with how substance use is conflicted with personal values and address any ambivalence to change.     Data: Client rated his motivation of the week on a scale of 1-10 (1=low, 10= high): 8. Client reports his main motivation for sobriety is \"my health\". Client reports \"job seeking\" is what blocks motivation for sobriety for him. Client reports using the following coping strategies that are helpful for his continued sobriety; \"going to meetings.\"      DIMENSION 5:  Relapse/Continued Use/Continued Problem Potential  Consider the degree to which the client recognizes relapse issues and has the skills to prevent relapse of either substance use or mental health problems.     Relapse/Continued Use/Continued Problem Potential - Current Risk Factor:  2    Goals:  Increase awareness of the disease concept of addiction and insight into personal relapse triggers and strategies to address.     Data: Client rated his \"Average Craving Rating\" on a scale of 1-10 (1=low, 10= high): 3. Client reports that the following things helped minimize his cravings this past week; \"going to meetings, group, and lifesaves.\" Client denies having any actual triggers to use this past week.    DIMENSION 6:  Recovery Environment  Consider the degree to which key areas of the client's life are supportive of or antagonistic to treatment participation and recovery.     Recovery Environment - Current Risk Factor: 2    Support group meetings attended this week: 6  [facility]  If zero attendance, what s preventing you: N/A.    Do you currently have a sponsor: Yes  Did you have contact with your sponsor this week? Yes    Did family agree to attend family week:  NA    If yes: " "NA    Goals:  Increase sober support network and work on obtaining employment.     Data: Client rated their \"Living Situation\" on a scale of 1-10 (1=very helpful, 10=very stressful): 5 due to \"stress due to lack of income/money\". Client reports during the past week that he \"got a sponsor and met new people at my meetings\" as a way to expand his sober support network. Client reports he enjoyed, \"going to meeting for pin night and for going to new  management meeting as a trustee\" for sober fun activities this past week.     >Client reports that he and his wife live in an on campus apartment at [facility] where is wife attends school. Client reports everything is supportive.  Employment: Client reports he is unemployed.   >10/30 Client reports he has applied for some jobs and had a phone interview this past week.   Volunteerism: No.     Intervention: Counselor/therapist used Behavioral Therapy, Cognitive Behavioral Therapy, Counselor Feedback, Education, Emotional management, Group Feedback, Mindfulness, Motivational Enhancement Therapy, Relapse Prevention, Twelve Step Facilitation, DBT and REBT.  >Client practiced the following skills in group session this week; mindfulness based stress reduction skills, along with breathing exercises. Writer provided client with verbal interventions including: validation, support, and psycho-education.    >Client also practiced the following skills in group session this week;   Monday: Client learned about the progression of the disease of addiction as well as the consequences for long term use of drugs and alcohol. Client watched a video titled \"Drunk in Public\" which helped him understand the disease concept of addiction. Client was also taught Part 3 of the Neurobiology of Addiction. Client learned about how their brain functions differently when using alcohol or drugs and then how the addiction or chemical dependency starts setting in.    Tuesday: Client " "learned about \"Anger Management Skills, Anger Warning Signs, Anger Stop Signs, Keeping an Anger Diary\" this week and how anger can be a very important factor to address in ones recovery program. Client also learned about how resentments tie into anger and vice-versa.  Wednesday: Client was taught how to do a \"Chain of Events Analysis\" as well as part 1 of the \"Steps for Healthy Decision Making.\"    Assessment:    Stages of Change Model: Client is currently in the Action Stage.  >Client was observed using the body scan and reported it helped with relaxation and anxiety. Client was observed using breathing exercises that were covered again during this weeks group session and client reported it helped with relaxation. Client appeared very open to continued learning for lifelong sobriety.     >Client was able to \"teach back\" the skills he learned on managing anger, \"Chain of Events Analysis\" as well as part 1 of the \"Steps for Healthy Decision Making.\" Client was also able to teach back what he learned on the neurobiology of addiction. Client was able to do a role play exercise with fellow group members regarding practicing how to identify when the the other person is struggling with unmanaged anger as well as identifying resentments by listening to what the the other person is saying or by observing their actions/re-actions. Client then practiced with another group member on doing chain of events analysis with one of their behaviors to find the root of the behavior or action and then using healthy decision making steps to change their future behaviors or actions towards things.    Plan:   -Continue to attend 2-3 sober support group meetings each week (this includes non-12-step/AA alternative meetings).  -Client will meet with his therapist once per week.  -Client will continue to work on treatment plan objectives.  -Client will follow all recommendations of counselor and any other medical provider which includes taking " all medications as prescribed.    -Client will attend all weekly Phase 1 group sessions.   -Client to engage in sober activities each week.  -Client will spend at least an hour per day looking for a job as client identified this as a goal for him on his treatment plan.      Abelardo Kaur MA, LM, [facility]

## 2018-11-12 ENCOUNTER — HOSPITAL ENCOUNTER (OUTPATIENT)
Dept: BEHAVIORAL HEALTH | Facility: CLINIC | Age: 33
End: 2018-11-12
Attending: FAMILY MEDICINE
Payer: COMMERCIAL

## 2018-11-12 PROCEDURE — H2035 A/D TX PROGRAM, PER HOUR: HCPCS | Mod: HQ

## 2018-11-13 ENCOUNTER — HOSPITAL ENCOUNTER (OUTPATIENT)
Dept: BEHAVIORAL HEALTH | Facility: CLINIC | Age: 33
End: 2018-11-13
Attending: FAMILY MEDICINE
Payer: COMMERCIAL

## 2018-11-13 PROCEDURE — H2035 A/D TX PROGRAM, PER HOUR: HCPCS | Mod: HQ

## 2018-11-14 ENCOUNTER — HOSPITAL ENCOUNTER (OUTPATIENT)
Dept: BEHAVIORAL HEALTH | Facility: CLINIC | Age: 33
End: 2018-11-14
Attending: FAMILY MEDICINE
Payer: COMMERCIAL

## 2018-11-14 PROCEDURE — H2035 A/D TX PROGRAM, PER HOUR: HCPCS | Mod: HQ

## 2018-11-15 NOTE — PROGRESS NOTES
"Key to reading dimensions:   -Bolded text in a dimension indicates information about a client at service initiation.  -Underlined portions in a dimension indicate important information that writer is carrying forward from week to week as a reminder.    CD ADULT Progress Note     Treatment Plan Review completed on:  11/14/2018     Attendance Dates: 11/12/18, 11/13/18, and 11/14/2018.     Total # of P1 Group Sessions: 13.  Total # of P2 Group Sessions: 0  Total # of P3 Group Sessions: 0  Total # of 1:1 Sessions: 2     Length of stay/projected discharge date: 01/15/19.      MONDAY TUESDAY WEDNESDAY THURSDAY FRIDAY SATURDAY SUNDAY TOTAL HOURS   Group Therapy  3 3 3     9   Specialty Groups*            1:1            Family Program           [place]             Phase III             Absent (place \"X\")             Total's 3 3 3     9     *Specialty Groups include Mental Health Care, Assertiveness and Communication, Sobriety Maintenance Skills, Spiritual Care, Stress Management, Relapse Prevention, Family Systems.                             Learning Style:    Visual  Hands on  Verbal  Demonstration    Staff member contributing: Abelardo Kaur MA, LMFT, [facility]     Received supervision: No.    Client contributed to goals and plan: Yes    Client received a signed copy of treatment plan/revised plan:  Yes     Changes to Treatment Plan: No    Client agrees with plan/revised plan:  Yes     Any changes in Vulnerable Adult Status:  No    1) Care Coordination Activities: No.  2) Medical, Mental Health and other appointments the client attended: SEE DIM's II & III below.  3) Medication issues: No None.  4) Physical and mental health problems: SEE DIM's II & III below.  5) Review and evaluation of the individual abuse prevention plan: N/A     Substance Use Disorders:    303.90 (F10.20) Alcohol Use Disorder Severe    ASAM Risk Ratings and Data     DIMENSION 1: Acute Intoxication/Withdrawal  The client's ability to cope with " "withdrawal symptoms and current state of intoxication     Acute Intoxication/Withdrawal - Current Risk Factor:  0    Reporting a sober date of: 09/11/18 for alcohol.    Goals:  Develop effective strategies to maintain sobriety. Report any substance or alcohol use to both counselor and the group.     Data: Client denied nor demonstrated any signs/symptomology of intoxication and/or withdrawal. Client denied having any symptoms of PAW. Client confirmed his last use date was on 09/10/18 for alcohol.     DIMENSION 2:  Biomedical Conditions and Complaints  The degree to which any physical disorder would interfere with treatment for substance abuse and the client's ability to tolerate any related discomfort     Biomedical Conditions and Complaints - Current Risk Factor:  1    Goals: Disclose CD status to medical providers and follow up with medical interventions while in DOP.     Data: Client rated his overall health on a scale of 1-10 (1=poor, 10=excellent): 8. Client reports doing the following for self-care during the past week; \"getting sleep and eating right.\" Client reports the following changes to his physical health over the past week; \"None\". Client reports the following upcoming doctor s appointments: None.     > Client reports the following physical health conditions;  Past Medical History:   Diagnosis Date     ACD (adult celiac disease)      Celiac disease      > Client reports the taking the following medications:  Current Outpatient Prescriptions   Medication     amLODIPine (NORVASC) 5 MG tablet     escitalopram (LEXAPRO) 10 MG tablet     hydrOXYzine (ATARAX) 25 MG tablet     MIRTAZAPINE PO     multivitamin, therapeutic with minerals (THERA-VIT-M) TABS tablet     nicotine (NICODERM CQ) 14 MG/24HR 24 hr patch     nicotine (NICODERM CQ) 7 MG/24HR 24 hr patch     thiamine 100 MG tablet     DIMENSION 3:  Emotional/Behavioral/Cognitive Conditions and Complications  The degree to which any condition or " complications are likely to interfere with treatment for substance abuse or with function in significant life areas and the likelihood of risk of harm to self or others.     Emotional/Behavioral - Current Risk Factor:  1    DSM-5 Diagnoses:   Official diagnosis (es) per Luis Armando Lopez MA, [facility], [facility] on 10/08/18;   > 296.32 (F33.1) Major Depressive Disorder, Recurrent Episode, Moderate   > Client reports experiencing;   1. Depressed mood most of the day, nearly every day, as indicated by either subjective report (e.g., feels sad, empty, hopeless) or observation made by others (e.g., appears tearful). (Note: In children and adolescents, can be irritable mood.)  2. Markedly diminished interest or pleasure in all, or almost all, activities most of the day, nearly every day (as indicated by either subjective account or observation).  3. Significant weight loss when not dieting or weight gain (e.g., a change of more than 5% of body weight in a month), or decrease or increase in appetite nearly every day. (Note: In children, consider failure to make expected weight gain.)  4. Insomnia or hypersomnia nearly every day.  5. Fatigue or loss of energy nearly every day.  6. Feelings of worthlessness or excessive or inappropriate guilt (which may be delusional) nearly every day (not merely self-reproach or guilt about being sick).  7. Diminished ability to think or concentrate, or indecisiveness, nearly every day (either by subjective account or as observed by others).  8. Recurrent thoughts of death (not just fear of dying), recurrent suicidal ideation without a specific plan, or a suicide attempt or a specific plan for committing suicide.    > 300.02 (F41.1) Generalized Anxiety Disorder   9. Client has had excessive anxiety and worry (apprehensive expectation), occurring more days than not for at least 6 months, about a number of events or activities (such as work or school performance).   10. The Client finds it difficult to  "control the worry.   11. The anxiety and worry are associated with three (or more) of the following six symptoms (with at least some symptoms having been present for more days than not for the past 6 months): (Client reports having five of the six)  1. Restlessness or feeling keyed up or on edge.  2. Being easily fatigued.  3. Difficulty concentrating or mind going blank.  4. Irritability.  5. Sleep disturbance (difficulty falling or staying asleep, or restless, unsatisfying sleep).  1. Client reports the anxiety, worry, or physical symptoms have caused clinically significant distress or impairment in social, occupational, or other important areas of functioning.    Suicide Assessment:   Risk Status    Ideation - Active thoughts of suicide Intent to follow through on suicide Plan for completing suicide    Yes No Yes No Yes No   Emergent  x  x  x   Urgent / Non-Emergent  x  x  x   Non- Urgent  x  x  x   No Current/Active Risk  x  x  x    Guns in the home?: No.     Goals: Understand the relationship between mental health concerns and addiction.     Data: Client reports feeling the following two emotions today; \"proud.\" Client rated the following MH symptoms on a scale of 1-10 (0=did not endorse): sleeplessness- 2, fatigue- 3, difficulty concentrating- 2, mood swings- 3, irritability- 2, sadness- 2, excessive sleep- 4, racing thoughts- 4, hopelessness- 3. Client denied suicidal ideation or self-injurious behavior.Client reports that for the higher ratings that he; \"is just letting the feelings pass and not dwell on the negatives\".     > Client reports the following appointment with his psychiatrist; November 29th. Client to check with his psychiatrist about getting a community psychotherapist referral while at this appointment.     > Client reports taking the following psychotropic medications; Hydroxyzine and Lexapro.    >Client reports his risk factors are; \"MH diagnosis with lack of historical treatment of MI, early " "recovery, significant physical health concern (Celiacs disease), and unemployment\".    >Client reports his protective factors are; \"sense of responsibility to family, motivation towards receiving treatment, and sober support group participation\".     DIMENSION 4:  Readiness to Change  Consider the amount of support and encouragement necessary to keep the client involved in treatment.     Readiness to Change - Current Risk Factor:  0    Goals:  Increase awareness with how substance use is conflicted with personal values and address any ambivalence to change.     Data: Client rated his motivation of the week on a scale of 1-10 (1=low, 10= high): 8. Client reports his main motivation for sobriety is \"my health\". Client reports \"job seeking\" is what blocks motivation for sobriety for him. Client reports using the following coping strategies that are helpful for his continued sobriety; \"going to meetings.\"      DIMENSION 5:  Relapse/Continued Use/Continued Problem Potential  Consider the degree to which the client recognizes relapse issues and has the skills to prevent relapse of either substance use or mental health problems.     Relapse/Continued Use/Continued Problem Potential - Current Risk Factor:  2    Goals:  Increase awareness of the disease concept of addiction and insight into personal relapse triggers and strategies to address.     Data: Client rated his \"Average Craving Rating\" on a scale of 1-10 (1=low, 10= high): 4. Client reports that the following things helped minimize his cravings this past week; \"havent had any cravings at all to drink this past week but have been also staying busy.\" Client denies having any actual triggers to use this past week.    DIMENSION 6:  Recovery Environment  Consider the degree to which key areas of the client's life are supportive of or antagonistic to treatment participation and recovery.     Recovery Environment - Current Risk Factor: 2    Support group meetings attended " "this week: 8  Where: [facility], [facility], & [facility].  If zero attendance, what s preventing you: N/A.    Do you currently have a sponsor: Yes  Did you have contact with your sponsor this week? Yes    Did family agree to attend family week:  NA    If yes: NA    Goals:  Increase sober support network and work on obtaining employment.     Data: Client rated their \"Living Situation\" on a scale of 1-10 (1=very helpful, 10=very stressful): 4 due to \"stress due to lack of income/money\". Client reports during the past week that he \"went to pin ceremony, attended the trustee mtg, and met new people at my meetings\" as a way to expand his sober support network. Client reports he enjoyed, \"going to AA meeting and Nicotine Anonymous mtg\" for sober fun activities this past week.     >Client reports that he and his wife live in an on campus apartment at [facility] where is wife attends school. Client reports everything is supportive.  Employment: Client reports he is unemployed. Client is actively looking for work and applying for jobs but hasn't been able to secure employment yet.   Volunteerism: Yes (explain) - \"Am the Trustee for one of my AA meetings\".     Intervention: Counselor/therapist used Behavioral Therapy, Cognitive Behavioral Therapy, Counselor Feedback, Education, Emotional management, Group Feedback, Mindfulness, Motivational Enhancement Therapy, Relapse Prevention, Twelve Step Facilitation, DBT and REBT.  >Client practiced the following skills in group session this week; mindfulness based stress reduction skills, along with breathing exercises. Writer provided client with verbal interventions including: validation, support, and psycho-education.     >Client practiced the following skills in group session this week;     Monday & Tuesday: Client was taught \"Coping Skills & Addictions\" as it relates to Social Support, Diversions, Building New Habits and Prevention. Client was also taught " "\"Healthy vs. Unhealthy Coping Skills\", \"Deep Breathing\", \"4-Box Breathing Skill\", \"7- Additional Breathing Skills\", \"101 Ways To [place]\", \"Coping Idea's\", and \"Making a Worry Box\". Client then took a coping skills test by identifying their \"25 Top Sober Coping Skills\" and developing a \"Coping Skills Plan\".   Wednesday: Client was taught about about healthy nutrition along with natural dopamine and serotonin enhancing foods. Client was also taught  Identifying and combating different types of boredom  along with a listing of  120 Free & Inexpensive Things To Do In The [facility] . Client was also taught about \"Personal Boundaries\" as well as signs and symptoms of ignored boundaries, \"Wolfe Building\" and \"How to Establish Healthy Boundaries\". Client then learned about the \"Karpman Drama [place]\" and how triangulation effects ones recovery.     >11/13 - (DIM 5) Client completed and presented to the group his assignment \"5 Years Sober Versus 5 Years Using\".     Assessment:    Stages of Change Model: Client is currently in the Action Stage.  >Client was observed using the body scan and reported it helped with relaxation and anxiety. Client was observed using breathing exercises that were covered again during this weeks group session and client reported it helped with relaxation. Client appeared very open to continued learning for lifelong sobriety.      >Client was able to \"teach back\" the skills he learned on coping skills, boredom, nutrition as it relates to recovery, boundaries and the \"Karpman Triangle\". Client was able to do a role play exercise with fellow group members regarding practicing various coping skills, and identifying if someone is being a \"Wolfe Violator\". Client gained good insight into the aforementioned topics he learned this week in group.     >Client appears to have good insight into what the next 5 years of his life will look like being sober and in recovery as he also knows what would " "happen if he doesn't per his assignment he completed titled \"5 Years Sober Versus 5 Years Using\".     Plan:   -Continue to attend 2-3 sober support group meetings each week (this includes non-12-step/AA alternative meetings).  -Client will meet with his therapist once per week.  -Client will continue to work on treatment plan objectives.  -Client will follow all recommendations of counselor and any other medical provider which includes taking all medications as prescribed.    -Client will attend all weekly Phase 1 group sessions with a plan to transition to Phase 2 starting 11/26/8   -Client to engage in sober activities each week.  -Client will spend at least an hour per day looking for a job as client identified this as a goal for him on his treatment plan.      Abelardo Kaur MA, LMFT, [facility]  "

## 2018-11-15 NOTE — ADDENDUM NOTE
Encounter addended by: Abelardo Kaur on: 11/15/2018 10:09 AM<BR>     Actions taken: Pend clinical note, Sign clinical note

## 2018-11-19 ENCOUNTER — HOSPITAL ENCOUNTER (OUTPATIENT)
Dept: BEHAVIORAL HEALTH | Facility: CLINIC | Age: 33
End: 2018-11-19
Attending: FAMILY MEDICINE
Payer: COMMERCIAL

## 2018-11-19 PROCEDURE — H2035 A/D TX PROGRAM, PER HOUR: HCPCS | Mod: HQ

## 2018-11-19 NOTE — ADDENDUM NOTE
Encounter addended by: Abelardo Kaur on: 11/19/2018  7:35 AM<BR>     Actions taken: Visit Navigator Flowsheet section accepted, Pend clinical note, Sign clinical note

## 2018-11-20 ENCOUNTER — HOSPITAL ENCOUNTER (OUTPATIENT)
Dept: BEHAVIORAL HEALTH | Facility: CLINIC | Age: 33
End: 2018-11-20
Attending: FAMILY MEDICINE
Payer: COMMERCIAL

## 2018-11-20 PROCEDURE — H2035 A/D TX PROGRAM, PER HOUR: HCPCS | Mod: HQ

## 2018-11-20 NOTE — PROGRESS NOTES
Case consultation:  D)  Therapist reviewed ct's case.  DOC alcohol.  MDD, DAIANA.  Ct is stable. P)  Continue tx plan.    SHAY Meléndez, LICSW, [facility]  Clinical

## 2018-11-21 ENCOUNTER — HOSPITAL ENCOUNTER (OUTPATIENT)
Dept: BEHAVIORAL HEALTH | Facility: CLINIC | Age: 33
End: 2018-11-21
Attending: FAMILY MEDICINE
Payer: COMMERCIAL

## 2018-11-21 ENCOUNTER — HOSPITAL ENCOUNTER (OUTPATIENT)
Dept: BEHAVIORAL HEALTH | Facility: CLINIC | Age: 33
End: 2018-11-21
Attending: SOCIAL WORKER
Payer: COMMERCIAL

## 2018-11-21 PROCEDURE — H2035 A/D TX PROGRAM, PER HOUR: HCPCS | Mod: HQ

## 2018-11-21 PROCEDURE — H2035 A/D TX PROGRAM, PER HOUR: HCPCS

## 2018-11-21 NOTE — PROGRESS NOTES
"Key to reading dimensions:   -Bolded text in a dimension indicates information about a client at service initiation.  -Underlined portions in a dimension indicate important information that writer is carrying forward from week to week as a reminder.    CD ADULT Progress Note     Treatment Plan Review completed on:  11/21/2018     Attendance Dates: 11/19/18, 11/20/18, and 11/21/2018.     Total # of P1 Group Sessions: 16.  Total # of P2 Group Sessions: 0  Total # of P3 Group Sessions: 0  Total # of 1:1 Sessions: 3     Length of stay/projected discharge date: 01/15/19.      MONDAY TUESDAY WEDNESDAY THURSDAY FRIDAY SATURDAY SUNDAY TOTAL HOURS   Group Therapy  3 3 3     9   Specialty Groups*            1:1    1     1   Family Program           [place]             Phase III             Absent (place \"X\")             Total's 3 3 4     10     *Specialty Groups include Mental Health Care, Assertiveness and Communication, Sobriety Maintenance Skills, Spiritual Care, Stress Management, Relapse Prevention, Family Systems.                             Learning Style:    Visual  Hands on  Verbal  Demonstration    Staff member contributing: Abelardo Kaur MA, LMFT, [facility]     Received supervision: Yes (explain) - W/ CSM for case consultation.    Client contributed to goals and plan: Yes    Client received a signed copy of treatment plan/revised plan:  Yes     Changes to Treatment Plan: No    Client agrees with plan/revised plan:  Yes     Any changes in Vulnerable Adult Status:  No    1) Care Coordination Activities: Yes (explain) - W/ CSM for case consultation.  2) Medical, Mental Health and other appointments the client attended: SEE DIM's II & III below.  3) Medication issues: No None.  4) Physical and mental health problems: SEE DIM's II & III below.  5) Review and evaluation of the individual abuse prevention plan: N/A     Substance Use Disorders:    303.90 (F10.20) Alcohol Use Disorder Severe    [facility] Risk Ratings and Data " "    DIMENSION 1: Acute Intoxication/Withdrawal  The client's ability to cope with withdrawal symptoms and current state of intoxication     Acute Intoxication/Withdrawal - Current Risk Factor:  0    Reporting a sober date of: 09/11/18 for alcohol.    Goals:  Develop effective strategies to maintain sobriety. Report any substance or alcohol use to both counselor and the group.     Data: Client denied nor demonstrated any signs/symptomology of intoxication and/or withdrawal. Client denied having any symptoms of PAW. Client confirmed his last use date was on 09/10/18 for alcohol.     DIMENSION 2:  Biomedical Conditions and Complaints  The degree to which any physical disorder would interfere with treatment for substance abuse and the client's ability to tolerate any related discomfort     Biomedical Conditions and Complaints - Current Risk Factor:  1    Goals: Disclose CD status to medical providers and follow up with medical interventions while in DOP.     Data: Client rated his overall health on a scale of 1-10 (1=poor, 10=excellent): 9. Client reports doing the following for self-care during the past week; \"getting out and doing something daily.\" Client reports the following changes to his physical health over the past week; \"None\". Client reports the following upcoming doctor s appointments: None.     > Client reports the following physical health conditions;  Past Medical History:   Diagnosis Date     ACD (adult celiac disease)      Celiac disease      > Client reports the taking the following medications:  Current Outpatient Prescriptions   Medication     amLODIPine (NORVASC) 5 MG tablet     escitalopram (LEXAPRO) 10 MG tablet     hydrOXYzine (ATARAX) 25 MG tablet     MIRTAZAPINE PO     multivitamin, therapeutic with minerals (THERA-VIT-M) TABS tablet     nicotine (NICODERM CQ) 14 MG/24HR 24 hr patch     nicotine (NICODERM CQ) 7 MG/24HR 24 hr patch     thiamine 100 MG tablet     DIMENSION 3:  " Emotional/Behavioral/Cognitive Conditions and Complications  The degree to which any condition or complications are likely to interfere with treatment for substance abuse or with function in significant life areas and the likelihood of risk of harm to self or others.     Emotional/Behavioral - Current Risk Factor:  1    DSM-5 Diagnoses:   Official diagnosis (es) per Luis Armando Lopez MA, [facility], [facility] on 10/08/18;   > 296.32 (F33.1) Major Depressive Disorder, Recurrent Episode, Moderate   > Client reports experiencing;   1. Depressed mood most of the day, nearly every day, as indicated by either subjective report (e.g., feels sad, empty, hopeless) or observation made by others (e.g., appears tearful). (Note: In children and adolescents, can be irritable mood.)  2. Markedly diminished interest or pleasure in all, or almost all, activities most of the day, nearly every day (as indicated by either subjective account or observation).  3. Significant weight loss when not dieting or weight gain (e.g., a change of more than 5% of body weight in a month), or decrease or increase in appetite nearly every day. (Note: In children, consider failure to make expected weight gain.)  4. Insomnia or hypersomnia nearly every day.  5. Fatigue or loss of energy nearly every day.  6. Feelings of worthlessness or excessive or inappropriate guilt (which may be delusional) nearly every day (not merely self-reproach or guilt about being sick).  7. Diminished ability to think or concentrate, or indecisiveness, nearly every day (either by subjective account or as observed by others).  8. Recurrent thoughts of death (not just fear of dying), recurrent suicidal ideation without a specific plan, or a suicide attempt or a specific plan for committing suicide.    > 300.02 (F41.1) Generalized Anxiety Disorder   9. Client has had excessive anxiety and worry (apprehensive expectation), occurring more days than not for at least 6 months, about a number of  "events or activities (such as work or school performance).   10. The Client finds it difficult to control the worry.   11. The anxiety and worry are associated with three (or more) of the following six symptoms (with at least some symptoms having been present for more days than not for the past 6 months): (Client reports having five of the six)  1. Restlessness or feeling keyed up or on edge.  2. Being easily fatigued.  3. Difficulty concentrating or mind going blank.  4. Irritability.  5. Sleep disturbance (difficulty falling or staying asleep, or restless, unsatisfying sleep).  1. Client reports the anxiety, worry, or physical symptoms have caused clinically significant distress or impairment in social, occupational, or other important areas of functioning.    Suicide Assessment:   Risk Status    Ideation - Active thoughts of suicide Intent to follow through on suicide Plan for completing suicide    Yes No Yes No Yes No   Emergent  x  x  x   Urgent / Non-Emergent  x  x  x   Non- Urgent  x  x  x   No Current/Active Risk  x  x  x    Guns in the home?: No.     Goals: Understand the relationship between mental health concerns and addiction.     Data: Client reports feeling the following two emotions today; \"nervoius and anxious.\" Client rated the following MH symptoms on a scale of 1-10 (0=did not endorse): sleeplessness- 2, fatigue- 3, difficulty concentrating- 4, mood swings- 3, irritability- 3, sadness- 2, excessive sleep- 2, racing thoughts- 4, hopelessness- 3. Client denied suicidal ideation or self-injurious behavior.    > Client reports the following appointment with his psychiatrist; November 29th. Client to check with his psychiatrist about getting a community psychotherapist referral while at this appointment.     > Client reports taking the following psychotropic medications; Hydroxyzine and Lexapro.    >Client reports his risk factors are; \"MH diagnosis with lack of historical treatment of MI, early " "recovery, significant physical health concern (Celiacs disease), and unemployment\".    >Client reports his protective factors are; \"sense of responsibility to family, motivation towards receiving treatment, and sober support group participation\".     DIMENSION 4:  Readiness to Change  Consider the amount of support and encouragement necessary to keep the client involved in treatment.     Readiness to Change - Current Risk Factor:  0    Goals:  Increase awareness with how substance use is conflicted with personal values and address any ambivalence to change.     Data: Client rated his motivation of the week on a scale of 1-10 (1=low, 10= high): 8. Client reports his main motivation for sobriety is \"my health and money\". Client reports \"holidays\" is what blocks motivation for sobriety for him. Client reports using the following coping strategies that are helpful for his continued sobriety; \"going to meetings, working out and breathing.\"      DIMENSION 5:  Relapse/Continued Use/Continued Problem Potential  Consider the degree to which the client recognizes relapse issues and has the skills to prevent relapse of either substance use or mental health problems.     Relapse/Continued Use/Continued Problem Potential - Current Risk Factor:  2    Goals:  Increase awareness of the disease concept of addiction and insight into personal relapse triggers and strategies to address.     Data: Client rated his \"Average Craving Rating\" on a scale of 1-10 (1=low, 10= high): 3. Client reports that the following things helped minimize his cravings this past week; \"staying busy and productive.\" Client reports that \"having 20 of gas money in my wallet\" was his biggest trigger for the week.    DIMENSION 6:  Recovery Environment  Consider the degree to which key areas of the client's life are supportive of or antagonistic to treatment participation and recovery.     Recovery Environment - Current Risk Factor: 2    Support group meetings " "attended this week: 6  Where: [facility], [facility] [employer], & [facility].  If zero attendance, what s preventing you: N/A.    Do you currently have a sponsor: Yes  Did you have contact with your sponsor this week? Yes    Did family agree to attend family week:  NA    If yes: NA    Goals:  Increase sober support network and work on obtaining employment.     Data: Client rated their \"Living Situation\" on a scale of 1-10 (1=very helpful, 10=very stressful): 4 due to \"stress due to lack of income/money\". Client reports during the past week that he \"started texting with a antonette from my [employer] mt\" as a way to expand his sober support network. Client reports he enjoyed, \"speaker meeting on Sunday night\" for sober fun activities this past week. Client reports he is proud of himself for making a new friend this week.     >Client reports that he and his wife live in an on campus apartment at [facility] where is wife attends school. Client reports everything is supportive.  Employment: Client reports he is unemployed. Client is actively looking for work and applying for jobs but hasn't been able to secure employment yet.   Volunteerism: Yes (explain) - \"Am the Trustee for one of my AA meetings\".     Intervention: Counselor/therapist used Behavioral Therapy, Cognitive Behavioral Therapy, Counselor Feedback, Education, Emotional management, Group Feedback, Mindfulness, Motivational Enhancement Therapy, Relapse Prevention, Twelve Step Facilitation, DBT and REBT.  >Client practiced the following skills in group session this week; mindfulness based stress reduction skills, along with breathing exercises. Writer provided client with verbal interventions including: validation, support, and psycho-education.    >Client practiced the following skills in group session this week;   Monday: Client was taught \"The Mental Health Benefits of Exercise, and Creating a Self-Care Plan\". Client then took a \"Self-Care " "Assessment\" where the client rated their current self-care in the areas of physical, psychological, social, spiritual, and professional self-care. Client was then able to see where they could improve and what they were already doing well in the areas of self-care. Client then completed \"Alternatives To Addictive Behavior\" assignment with the group.  Tuesday: Client was taught \"What Is Depression, What Is Anxiety, What Are Panic Attacks, What Is Bipolar Disorder\", the different lobes of the brain and their functions as it relates to MH, and addiction, how to keep a daily mood chart, and \"Sleep Hygiene\".    Wednesday: Client was taught about \"What is Trauma, The Fight-or-Flight Response, The Cycle of Abuse, The Grieving Process, The ABC Model, and The Emotions Model\".     Assessment:    Stages of Change Model: Client is currently in the Action Stage.  >Client was observed using the body scan and reported it helped with relaxation and anxiety. Client was observed using breathing exercises that were covered again during this weeks group session and client reported it helped with relaxation. Client appeared very open to continued learning for lifelong sobriety.     >Client was able to \"teach back\" the skills he learned on developing a productive and balanced self-care plan, various MH diagnoses', the role trauma plays on MH and addiction, different models of decision making/reactions, the importance of sleep to maintaining better MH and recovery program. Client was able to do a role play exercise with fellow group members regarding the fight-or-flight response, identifying various MH disorders in others and creating a self-care plan. Client gained good insight into the importance of self-care along with alternative behaviors he could engage in instead of addictive behaviors. Client gained good insight into the different MH diagnoses' along with different treatments for each, as well as the importance of maintaining good " sleep hygiene as a major pillar of ones recovery from MH and/or addictive disorders. Client gained good insight into the the other aforementioned topics he learned this week in group.    Plan:   -Continue to attend 2-3 sober support group meetings each week (this includes non-12-step/AA alternative meetings).  -Client will meet with his therapist once per week.  -Client will continue to work on treatment plan objectives.  -Client will follow all recommendations of counselor and any other medical provider which includes taking all medications as prescribed.    -Client will attend all weekly Phase 1 group sessions with a plan to transition to Phase 2 starting 11/26/8   -Client to engage in sober activities each week.  -Client will spend at least an hour per day looking for a job as client identified this as a goal for him on his treatment plan.      Abelardo Kaur MA, LMFT, [facility]

## 2018-11-21 NOTE — PROGRESS NOTES
"Individual Session Summary    Data: Met with client on this date for an individual session. The session focused on clients anxiety/stress related to serious financial stressors due to client and his wife being unemployed and don't have much food to eat at home. Writer assisted client with printing off [facility] Economic Assistance and Food Support Application. Writer gave the application to client and client reports he will fill it out when he gets home with his wife. Writer went through the \"Calm Place\" coping skill using BLS (bilateral brain stimulation) and \"Container\" coping skill with the client to continue solidifying the skills (Calm Place) within the neuro pathways for easier everyday access when used. Writer also talked about more mindfulness based stress reduction techniques around \"focusing on the breath\".    Intervention: Individual session with client. Provided client with verbal interventions including: validation, support, and psycho-education. Client was taught the menjivar mind versus the emotional mind and how he will be able to think more clearer during stressful situations if he stays in his menjivar mind. Writer also provided client with a listing of various food shelf's close to his apartment in order for him and his wife to obtain food. Client was taught more mindfulness based stress reduction skills along with continued work on the \"Calm Place\" coping skill using bilateral brain stimulation and \"Container\" exercise. Writer also taught client how to use \"Butterfly Taps\" to use at home or anywhere else when employing \"Calm Place\" exercise/coping skill.     Assessment: Client appears very anxious regarding his finances. Client practiced deep breathing/mindfulness exercises as well as his \"Calm Place\" exercise. Client continues to appear very open and willing to learn new skills that will help him maintain lifelong recovery. Client appeared to get some relief with his anxiety after learning about the " "menjivar versus emotional mind as well as writer assisting him in identifying potential financial assistance options. Client was able to rid himself of a distressing thought when writer mentioned his \"que\" word (\"Outdoors\").    Plan: Client to continue with treatment plan objectives and follow threw with completing emergency financial support application. Client to fax in [facility] support application. Client will continue to practice \"Calm Place\" using \"Butterfly Taps\" and \"Container\" coping skills at home along with other mindfulness techniques he already practices. Client has an psychiatric med management appointment with Shayan Cedillo on 11/29 and will ask them to schedule an appointment with a therapist for ongoing therapy with a community therapist.     Abelardo Kaur MA, LMFT, [facility]  Licensed Psychotherapist           "

## 2018-11-25 NOTE — ADDENDUM NOTE
Encounter addended by: Abelardo Kaur on: 11/25/2018 12:23 PM<BR>     Actions taken: Sign clinical note

## 2018-11-26 ENCOUNTER — HOSPITAL ENCOUNTER (OUTPATIENT)
Dept: BEHAVIORAL HEALTH | Facility: CLINIC | Age: 33
End: 2018-11-26
Attending: FAMILY MEDICINE
Payer: COMMERCIAL

## 2018-11-26 PROCEDURE — H2035 A/D TX PROGRAM, PER HOUR: HCPCS | Mod: HQ

## 2018-11-27 ENCOUNTER — HOSPITAL ENCOUNTER (EMERGENCY)
Facility: CLINIC | Age: 33
Discharge: HOME | End: 2018-11-27
Attending: EMERGENCY MEDICINE | Admitting: EMERGENCY MEDICINE
Payer: COMMERCIAL

## 2018-11-27 ENCOUNTER — HOSPITAL ENCOUNTER (OUTPATIENT)
Dept: BEHAVIORAL HEALTH | Facility: CLINIC | Age: 33
End: 2018-11-27
Attending: FAMILY MEDICINE
Payer: COMMERCIAL

## 2018-11-27 VITALS
HEART RATE: 68 BPM | TEMPERATURE: 96.7 F | OXYGEN SATURATION: 97 % | SYSTOLIC BLOOD PRESSURE: 147 MMHG | DIASTOLIC BLOOD PRESSURE: 100 MMHG | WEIGHT: 169.25 LBS | RESPIRATION RATE: 16 BRPM | BODY MASS INDEX: 24.28 KG/M2

## 2018-11-27 DIAGNOSIS — F32.0 MILD MAJOR DEPRESSION (H): ICD-10-CM

## 2018-11-27 DIAGNOSIS — Z76.0 ENCOUNTER FOR MEDICATION REFILL: ICD-10-CM

## 2018-11-27 LAB
AMPHETAMINES UR QL SCN: NEGATIVE
BARBITURATES UR QL: NEGATIVE
BENZODIAZ UR QL: NEGATIVE
CANNABINOIDS UR QL SCN: NEGATIVE
COCAINE UR QL: NEGATIVE
ETHANOL UR QL SCN: NEGATIVE
OPIATES UR QL SCN: NEGATIVE

## 2018-11-27 PROCEDURE — 99283 EMERGENCY DEPT VISIT LOW MDM: CPT | Performed by: EMERGENCY MEDICINE

## 2018-11-27 PROCEDURE — H2035 A/D TX PROGRAM, PER HOUR: HCPCS | Mod: HQ

## 2018-11-27 PROCEDURE — 80320 DRUG SCREEN QUANTALCOHOLS: CPT | Performed by: FAMILY MEDICINE

## 2018-11-27 PROCEDURE — 99283 EMERGENCY DEPT VISIT LOW MDM: CPT | Mod: Z6 | Performed by: EMERGENCY MEDICINE

## 2018-11-27 PROCEDURE — 80307 DRUG TEST PRSMV CHEM ANLYZR: CPT | Performed by: FAMILY MEDICINE

## 2018-11-27 RX ORDER — ESCITALOPRAM OXALATE 10 MG/1
10 TABLET ORAL DAILY
Qty: 30 TABLET | Refills: 0 | Status: SHIPPED | OUTPATIENT
Start: 2018-11-27

## 2018-11-27 RX ORDER — MIRTAZAPINE 15 MG/1
15 TABLET, FILM COATED ORAL AT BEDTIME
Qty: 10 TABLET | Refills: 0 | Status: SHIPPED | OUTPATIENT
Start: 2018-11-27

## 2018-11-27 NOTE — ED AVS SNAPSHOT
[facility], Emergency Department    [address]    [facility] [address]    Phone:  [phone]    Fax:  [phone]                                       Glenn Simons   MRN: [id number]    Department:  [facility], Emergency Department   Date of Visit:  11/27/2018           Patient Information     Date Of Birth          1985        Your diagnoses for this visit were:     Encounter for medication refill     Mild major depression (H)        You were seen by Anya Freeman MD.        Discharge Instructions       Take the medication as prescribed.     Follow up with your doctor on Thursday as scheduled.     Return to the ER if any other problems/concerns.     Your next 10 appointments already scheduled     Nov 28, 2018  9:00 AM CST   Treatment with CO-OCCURINIG DOP MIX PHASE 1   [facility] ([facility])    [address] [place] [address]   [phone]            Dec 03, 2018  9:00 AM CST   Treatment with CO-OCCURINIG DOP MIX PHASE 1   [facility] ([facility])    [address] [place] [address]   [phone]            Dec 04, 2018  9:00 AM CST   Treatment with CO-OCCURINIG DOP MIX PHASE 1   [facility] ([facility])    [address] [place] [address]   [phone]            Dec 05, 2018  9:00 AM CST   Treatment with CO-OCCURINIG DOP MIX PHASE 1   [facility] ([facility])    [address] [place] [address]   [phone]            Dec 10, 2018  9:00 AM CST   Treatment with CO-OCCURINIG DOP MIX PHASE 1   [facility] ([facility])    [address] [place] [address]   [phone]            Dec 11,  2018  9:00 AM CST   Treatment with CO-OCCURINIG DOP MIX PHASE 1   [facility] ([facility])    [address] [address]   [phone]            Dec 12, 2018  9:00 AM CST   Treatment with CO-OCCURINIG DOP MIX PHASE 1   [facility] ([facility])    [address] [address]   [phone]            Dec 17, 2018  9:00 AM CST   Treatment with CO-OCCURINIG DOP MIX PHASE 1   [facility] ([facility])    [facility] [address] [address]   [phone]            Dec 18, 2018  9:00 AM CST   Treatment with CO-OCCURINIG DOP MIX PHASE 1   [facility] ([facility])    [address] [place] [address]   [phone]            Dec 19, 2018  9:00 AM CST   Treatment with CO-OCCURINIG DOP MIX PHASE 1   [facility] ([facility])    [address] [place] [address]   [phone]              24 Hour Appointment Hotline       To make an appointment at any [facility] clinic, call [phone] ([phone]). If you don't have a family doctor or clinic, we will help you find one. [facility] clinics are conveniently located to serve the needs of you and your family.             Review of your medicines      Our records show that you are taking the medicines listed below. If these are incorrect, please call your family doctor or clinic.        Dose / Directions Last dose taken    amLODIPine 5 MG tablet   Commonly known as:  NORVASC   Dose:  5 mg   Quantity:  30 tablet        Take 1 tablet (5 mg) by mouth daily   Refills:  0        hydrOXYzine 25 MG tablet   Commonly known as:  ATARAX   Dose:  25 mg   Quantity:  15 tablet        Take 1  tablet (25 mg) by mouth every 6 hours as needed for anxiety or other (adjuvant pain)   Refills:  0        nicotine 7 MG/24HR 24 hr patch   Commonly known as:  NICODERM CQ   Dose:  1 patch   Quantity:  28 patch        Place 1 patch onto the skin every 24 hours   Refills:  1        vitamin B1 100 MG tablet   Commonly known as:  THIAMINE   Dose:  100 mg   Quantity:  30 tablet        Take 1 tablet (100 mg) by mouth daily   Refills:  0          ASK your doctor about these medications        Dose / Directions Last dose taken    * LEXAPRO PO   Dose:  20 mg   Ask about: Which instructions should I use?        Take 20 mg by mouth daily   Refills:  0        * escitalopram 10 MG tablet   Commonly known as:  LEXAPRO   Dose:  10 mg   Quantity:  30 tablet   Ask about: Which instructions should I use?        Take 1 tablet (10 mg) by mouth daily   Refills:  0        * REMERON PO   Dose:  15 mg   What changed:  Another medication with the same name was changed. Make sure you understand how and when to take each.   Ask about: Which instructions should I use?        Take 15 mg by mouth   Refills:  0        * mirtazapine 15 MG tablet   Commonly known as:  REMERON   Dose:  15 mg   What changed:  medication strength   Quantity:  10 tablet   Ask about: Which instructions should I use?        Take 1 tablet (15 mg) by mouth At Bedtime   Refills:  0        * Notice:  This list has 4 medication(s) that are the same as other medications prescribed for you. Read the directions carefully, and ask your doctor or other care provider to review them with you.            Prescriptions were sent or printed at these locations (2 Prescriptions)                   Other Prescriptions                Printed at Department/Unit printer (2 of 2)         escitalopram (LEXAPRO) 10 MG tablet               mirtazapine (REMERON) 15 MG tablet                Procedures and tests performed during your visit     Drug abuse screen 6 urine (chem dep)      Orders Needing  Specimen Collection     None      Pending Results     Date and Time Order Name Status Description    11/27/2018 1230 Drug abuse screen 6 urine (chem dep) In process             Pending Culture Results     Date and Time Order Name Status Description    11/27/2018 1230 Drug abuse screen 6 urine (chem dep) In process             Pending Results Instructions     If you had any lab results that were not finalized at the time of your Discharge, you can call the ED Lab Result RN at [phone]. You will be contacted by this team for any positive Lab results or changes in treatment. The nurses are available 7 days a week from 10A to 6:30P.  You can leave a message 24 hours per day and they will return your call.        Thank you for choosing [facility]       Thank you for choosing [facility] for your care. Our goal is always to provide you with excellent care. Hearing back from our patients is one way we can continue to improve our services. Please take a few minutes to complete the written survey that you may receive in the mail after you visit with us. Thank you!        Care EveryWhere ID     This is your Care EveryWhere ID. This could be used by other organizations to access your [facility] medical records  [id number]        Equal Access to Services     TANK REYES : Kraig Waggoner, lenin aquino, ronn cameron. So [facility] [phone].    ATENCIÓN: Si habla español, tiene a buchanan disposición servicios gratuitos de asistencia lingüística. Llame al [phone].    We comply with applicable federal civil rights laws and [place] laws. We do not discriminate on the basis of race, color, national origin, age, disability, sex, sexual orientation, or gender identity.            After Visit Summary       This is your record. Keep this with you and show to your community pharmacist(s) and doctor(s) at your next visit.

## 2018-11-27 NOTE — DISCHARGE INSTRUCTIONS
Take the medication as prescribed.     Follow up with your doctor on Thursday as scheduled.     Return to the ER if any other problems/concerns.

## 2018-11-27 NOTE — ED AVS SNAPSHOT
[facility], Emergency Department    [address]    [facility] [address]    Phone:  [phone]    Fax:  [phone]                                       Glenn Simons   MRN: [id number]    Department:  [facility], Emergency Department   Date of Visit:  11/27/2018           After Visit Summary Signature Page     I have received my discharge instructions, and my questions have been answered. I have discussed any challenges I see with this plan with the nurse or doctor.    ..........................................................................................................................................  Patient/Patient Representative Signature      ..........................................................................................................................................  Patient Representative Print Name and Relationship to Patient    ..................................................               ................................................  Date                                   Time    ..........................................................................................................................................  Reviewed by Signature/Title    ...................................................              ..............................................  Date                                               Time          22EPIC Rev 08/18

## 2018-11-27 NOTE — ED PROVIDER NOTES
History     Chief Complaint   Patient presents with     Medication Refill     ran out of Remeron and lexapro, requesting refills     HPI  Glenn Simons is a 33 year old male with a history of hypertension, celiac disease, chemical dependency, who presents to the emergency department for a medication refill. Patient states his next psychiatry appointment with his prescribing physician is Thursday 11/29 and is requesting a refill here for the meantime.  Patient is requesting refill of Remeron and Lexapro.  He reports he will be starting a new job as a  where he will be working midnight to 8 AM. Denies any new psychiatric or medical concerns.     I have reviewed the Medications, Allergies, Past Medical and Surgical History, and Social History in the [employer] system.  Past Medical History:   Diagnosis Date     ACD (adult celiac disease)      Celiac disease      Hypertension        History reviewed. No pertinent surgical history.    No family history on file.    Social History   Substance Use Topics     Smoking status: Former Smoker     Packs/day: 0.25     Years: 10.00     Smokeless tobacco: Never Used     Alcohol use No      Comment: sober since sept 8018       No current facility-administered medications for this encounter.      Current Outpatient Prescriptions   Medication     escitalopram (LEXAPRO) 10 MG tablet     hydrOXYzine (ATARAX) 25 MG tablet     mirtazapine (REMERON) 15 MG tablet     nicotine (NICODERM CQ) 7 MG/24HR 24 hr patch     amLODIPine (NORVASC) 5 MG tablet     thiamine 100 MG tablet     [DISCONTINUED] escitalopram (LEXAPRO) 10 MG tablet     [DISCONTINUED] MIRTAZAPINE PO     Facility-Administered Medications Ordered in Other Encounters   Medication     Self Administer Medications: Behavioral Services        Allergies   Allergen Reactions     Amoxicillin Unknown     Gluten Meal      Tylenol W/Codeine [Acetaminophen-Codeine] Unknown     Tylenol [Acetaminophen] Unknown     Wheat Bran GI  Disturbance       Review of Systems   All other systems reviewed and are negative.      Physical Exam   BP: (!) 147/100  Pulse: 68  Temp: 96.7  F (35.9  C)  Resp: 16  Weight: 76.8 kg (169 lb 4 oz)  SpO2: 97 %      Physical Exam  Physical Exam   Constitutional: oriented to person, place, and time. appears well-developed and well-nourished.   HENT:   Head: Normocephalic and atraumatic.   Neck: Normal range of motion.   Pulmonary/Chest: Effort normal. No respiratory distress.   Neurological: alert and oriented to person, place, and time.   Skin: Skin is warm and dry.   Psychiatric:  normal mood and affect.  behavior is normal. Thought content normal.     ED Course   12:16 PM  The patient was seen and examined by Anya Freeman MD in Room 16C.     ED Course     Procedures             Critical Care time:  none             Labs Ordered and Resulted from Time of ED Arrival Up to the Time of Departure from the ED - No data to display         Assessments & Plan (with Medical Decision Making)   This is a 33-year-old male presents the ER needing a refill of his Lexapro and Remeron.  Patient is in a day treatment program currently has no acute medical or psychiatric concerns.  Patient will have both these medications refilled with instructions to follow-up with a psychiatrist on Thursday as scheduled.  Patient stable for discharge.    I have reviewed the nursing notes.    I have reviewed the findings, diagnosis, plan and need for follow up with the patient.    New Prescriptions    No medications on file       Final diagnoses:   Encounter for medication refill     IJuan, am serving as a trained medical scribe to document services personally performed by Anya Freeman MD, based on the provider's statements to me.   IAnya MD, was physically present and have reviewed and verified the accuracy of this note documented by Juan Castro.    11/27/2018   [facility], [place], EMERGENCY DEPARTMENT     Anya Freeman  MD Nasir  11/27/18 [id number]

## 2018-11-28 ENCOUNTER — HOSPITAL ENCOUNTER (OUTPATIENT)
Dept: BEHAVIORAL HEALTH | Facility: CLINIC | Age: 33
End: 2018-11-28
Attending: FAMILY MEDICINE
Payer: COMMERCIAL

## 2018-11-28 PROCEDURE — H2035 A/D TX PROGRAM, PER HOUR: HCPCS | Mod: HQ

## 2018-11-28 NOTE — PROGRESS NOTES
"[facility] INTENSIVE OUTPATIENT PROGRAM  TRANSITION PROGRESS NOTE     Patient: Glenn Simons  MRN; [id number]   : 1985  Age: 33 year old Sex: male  IOP ADMISSION DATE: 10/17/18  TRANSITION DATE: 18  TRANSITIONING FROM: Phase 1 TO: Phase 2  SUBSTANCE USE DISORDERS:   303.90 (F10.20) Alcohol Use Disorder Severe    LAST USE DATE: 09/10/18 for alcohol.    Treatment Plan Review completed on:  2018     Attendance Dates: 18, 18, and 2018.     Total # of P1 Group Sessions: 16.  Total # of P2 Group Sessions: 3  Total # of P3 Group Sessions: 0  Total # of 1:1 Sessions: 3     Length of stay/projected discharge date: 01/15/19.       TOTAL HOURS   Group Therapy  3 3 3     9   Specialty Groups*            1:1            Family Program           [place]             Phase III             Absent (place \"X\")             Total's 3 3 3     9     *Specialty Groups include Mental Health Care, Assertiveness and Communication, Sobriety Maintenance Skills, Spiritual Care, Stress Management, Relapse Prevention, Family Systems.                             Learning Style:    Visual  Hands on  Verbal  Demonstration    Staff member contributing: Abelardo Kaur MA, LMFT, LADC     Received supervision: No.    Client contributed to goals and plan: Yes    Client received a signed copy of treatment plan/revised plan:  Yes     Changes to Treatment Plan: No    Client agrees with plan/revised plan:  Yes     Any changes in Vulnerable Adult Status:  No    1) Care Coordination Activities: No.  2) Medical, Mental Health and other appointments the client attended: SEE DIM's II & III below.  3) Medication issues: No None.  4) Physical and mental health problems: SEE DIM's II & III below.  5) Review and evaluation of the individual abuse prevention plan: N/A   Dimension One: Acute Intoxication/Withdrawal Potential     Risk at admission to [facility]: 0. " "Risk at transition to Phase 2: Risk Ratin.     Treatment plan goal:   Develop effective strategies to maintain sobriety. Goal Status: CONTINUE.     Current ASAM criteria: no withdrawal risk.     Additional comments: Client denies nor demonstrated any signs/symptomology of intoxication and/or withdrawal. Client denied having any symptoms of PAW. Client confirmed his last use date was on 09/10/18 for alcohol.      Dimension Two: Biomedical Conditions and Complaints     Risk at admission to IOP: 0. Risk at transition to Phase 2: Risk Ratin.     Treatment plan goal:   Disclose CD status to medical providers and follow up with medical interventions while in treatment program. CONTINUE.     Current ASAM criteria: stable, manageable and not distracting.     Additional comments: Client reports having a medical diagnosis of Celiac Disease and reports it doesn't interfere with him to participate in tx or any recovery skills building/workshops. Client reports he can set-up his own medical appts and can manage all aspects of his physical health.    Dimension Three: Emotional/Behavioral/Cognitive Conditions and Complications     Risk at admission to IOP: 1. Risk at transition to Phase 2: Risk Ratin.     Treatment plan goal:   Understand the relationship between mental health concerns and addiction. CONTINUE.     Current ASAM criteria: stable, manageable and mild severity     Additional comments: Client has a MH Diagnosis of Major Depressive Disorder and DAIANA. Client follows all recommendations in this dimension related to continuing to be med compliant and establishing care with a community psychotherapist for when he completes the DOP Program.    Client has continuously denied having any suicidal ideations or self-injurious behaviors while in DOP [place] Program.    > Client reports taking the following psychotropic medications; Hydroxyzine and Lexapro.    >Client reports his risk factors are; \"MH diagnosis with lack of " "historical treatment of MI, early recovery, significant physical health concern (Celiacs disease), and unemployment\".    >Client reports his protective factors are; \"sense of responsibility to family, motivation towards receiving treatment, and sober support group participation\".     Dimension Four: Readiness to Change     Risk at admission to IOP: 0. Risk at transition to Phase 2: Risk Ratin.     Treatment plan goal:   Increase awareness with how  substance use is conflicted with personal values and address any ambivalence to change. CONTINUE.     Current ASAM criteria: cooperative, motivated, ready and committed to change, engaged in treatment, willing to explore how use affects personal goals and ready for recovery.     Additional comments: Client is in the Action Stage within the Stages of Change Model.      Dimension Five: Relapse/Continues Use/Continues Problem Potential     Risk at admission to IOP: 3. Risk at transition to Phase 2: Risk Ratin.     Treatment plan goal:   Increase awareness of the disease concept of addiction and insight into personal relapse process, triggers and strategies to address. CONTINUE.     Current ASAM criteria: recognizes risk well and demonstrates ability to manage potential problems and recognizes relapse issues and prevention strategies but displays some vulnerability for further substance use problems     Additional comments: Client is following through with all treatment plan objectives and is motivated for livelong recovery.     Dimension Six: Recovery Environment     Risk at admission to IOP: 2. Risk at transition to Phase 2: Risk Ratin.     Treatment plan goal:   Expand sober support network, be involved in sober activities and obtain employment. CONTINUE.     Current ASAM criteria: supportive environment, engaged in structured and meaningful activity, client has coping skills and can cope with structure and support     Additional comments: Client lives with his " "wife in an apartment on campus at [facility]. Client reports financial stress and is unemployed. Clients wife is currently a FT Student. Client has been applying for jobs and has had several interviews. Client attends 5+ sober support group mtgs weekly and is working on expanding his sober network.   Volunteerism: Yes (explain) - \"Am the Trustee for one of my AA meetings\".     Intervention: Counselor/therapist used Behavioral Therapy, Cognitive Behavioral Therapy, Counselor Feedback, Education, Emotional management, Group Feedback, Mindfulness, Motivational Enhancement Therapy, Relapse Prevention, Twelve Step Facilitation, DBT and REBT.  >Client practiced the following skills in group session this week; mindfulness based stress reduction skills, along with breathing exercises. Writer provided client with verbal interventions including: validation, support, and psycho-education.     >Client practiced the following skills in group session this week;   Monday: Client was taught \"What is Worry, My Fears, and Defense Mechanisms.\" Client then completed \"Understanding My Defense Mechanisms and Overcoming Avoidance: Facing Your Fears\" assignments in group.  Tuesday: Client was taught \"Thinking Errors, Automatic Thoughts, Challenging Negative Thoughts, Challenging Anxious Thoughts, Socratic Questions, and Putting Thoughts on Trial.\" Client then completed \"Correcting Distorted Thinking\" Assignment in group.    Wednesday: Client was taught about \"The Cognitive Model\" as it relates to Thoughts->Emotions->Behaviors. Client then practiced \"The Cognitive Model\" practice exercises regarding how we can experience the same situation in different ways based upon our thoughts. Client was also taught \"Decatastrophizing, Cognitive Distortions, Positive Activities for Behavioral Activation, and Core Beliefs.\" Client then completed \"Changing Self-Talk\" Assignment in group.      Assessment:    Stages of Change Model: Client is " "currently in the Early-Action Stage.  >Client was observed using the body scan and reported it helped with relaxation and anxiety. Client was observed using breathing exercises that were covered again during this weeks group session and client reported it helped with relaxation. Client appeared very open to continued learning for lifelong sobriety.      >Client was able to \"teach back\" the skills he learned on identifying his defense mechanisms, overcoming un-nessassary fear and worry, identifying his thinking errors as well as his automatic thoughts. Client then taught back skills he learned with challenging his negative thoughts, how to properly use socratic questions in recovery, how the cognitive model works, along with insight into how he might de-catastrophize, as well as insight into his core beliefs.    Client was able to do a role play exercise with fellow group members regarding how to identify cognitive distortions in each other, identifying defense mechanisms, and how to encourage positive self-talk in each other. Client gained good insight into the the other aforementioned topics he learned this week in group.    PLAN: Transition client to Phase 2 effective 11/26/18 due to associated risk ratings and client feeling he is ready to step down to an OP level of care.    Client currently meets the ASAM criteria for; Outpatient (ASAM level 1)  level of care.    Abelardo Kaur MA, LMFT, [facility]  Licensed Psychotherapist     "

## 2018-11-29 NOTE — ADDENDUM NOTE
Encounter addended by: Abelardo Kaur on: 11/29/2018 12:22 PM<BR>     Actions taken: Pend clinical note, Sign clinical note

## 2018-12-04 ENCOUNTER — HOSPITAL ENCOUNTER (OUTPATIENT)
Dept: BEHAVIORAL HEALTH | Facility: CLINIC | Age: 33
End: 2018-12-04
Attending: FAMILY MEDICINE
Payer: COMMERCIAL

## 2018-12-04 PROCEDURE — H2035 A/D TX PROGRAM, PER HOUR: HCPCS | Mod: HQ

## 2018-12-05 ENCOUNTER — HOSPITAL ENCOUNTER (OUTPATIENT)
Dept: BEHAVIORAL HEALTH | Facility: CLINIC | Age: 33
End: 2018-12-05
Attending: SOCIAL WORKER
Payer: COMMERCIAL

## 2018-12-05 PROCEDURE — H2035 A/D TX PROGRAM, PER HOUR: HCPCS | Mod: HQ

## 2018-12-05 NOTE — ADDENDUM NOTE
Encounter addended by: Abelardo Kaur on: 12/5/2018  8:25 AM<BR>     Actions taken: Pend clinical note, Sign clinical note, Delete clinical note

## 2018-12-05 NOTE — PROGRESS NOTES
"Key to reading dimensions:   -Bolded text in a dimension indicates information about a client at service initiation.  -Underlined portions in a dimension indicate important information that writer is carrying forward from week to week as a reminder.    CD ADULT Progress Note     Treatment Plan Review completed on:  12/05/2018     Attendance Dates: 12/04/18, & 12/05/18.     Total # of P1 Group Sessions: 16.  Total # of P2 Group Sessions: 6  Total # of P3 Group Sessions: 0  Total # of 1:1 Sessions: 3     Length of stay/projected discharge date: 01/15/19.      MONDAY TUESDAY WEDNESDAY THURSDAY FRIDAY SATURDAY SUNDAY TOTAL HOURS   Group Therapy   2 2     4   Specialty Groups*            1:1            Family Program           [place]             Phase III             Absent (place \"X\") X  Excused            Total's  2 2     4     *Specialty Groups include Mental Health Care, Assertiveness and Communication, Sobriety Maintenance Skills, Spiritual Care, Stress Management, Relapse Prevention, Family Systems.                             Learning Style:    Visual  Hands on  Verbal  Demonstration    Staff member contributing: Abelardo Kaur MA, KRISS, ROSEMARY     Received supervision: No.    Client contributed to goals and plan: Yes    Client received a signed copy of treatment plan/revised plan:  Yes     Changes to Treatment Plan: No    Client agrees with plan/revised plan:  Yes     Any changes in Vulnerable Adult Status:  No    1) Care Coordination Activities: No.  2) Medical, Mental Health and other appointments the client attended: SEE DIM's II & III below.  3) Medication issues: No None.  4) Physical and mental health problems: SEE DIM's II & III below.  5) Review and evaluation of the individual abuse prevention plan: N/A     Substance Use Disorders:    303.90 (F10.20) Alcohol Use Disorder Severe    ASAM Risk Ratings and Data     DIMENSION 1: Acute Intoxication/Withdrawal  The client's ability to cope with withdrawal " "symptoms and current state of intoxication     Acute Intoxication/Withdrawal - Current Risk Factor:  0    Reporting a sober date of: 09/11/18 for alcohol.    Goals:  Develop effective strategies to maintain sobriety and to report any substance or alcohol use to both counselor and group.     Data: Client denied nor demonstrated any signs/symptomology of intoxication and/or withdrawal. Client denied having any symptoms of PAW. Client confirmed his last use date was on 09/10/18 for alcohol.     DIMENSION 2:  Biomedical Conditions and Complaints  The degree to which any physical disorder would interfere with treatment for substance abuse and the client's ability to tolerate any related discomfort     Biomedical Conditions and Complaints - Current Risk Factor:  1    Goals: Disclose CD status to medical providers and follow up with medical interventions while in DOP.     Data: Client rated his overall health on a scale of 1-10 (1=poor, 10=excellent): 8. Client reports doing the following for self-care during the past week; \"taking my medications.\" Client reports the following changes to his physical health over the past week; \"None\". Client reports the following upcoming doctor s appointments: None.     > Client reports the following physical health conditions;  Past Medical History:   Diagnosis Date     ACD (adult celiac disease)      Anxiety      Celiac disease      Depressive disorder      Hypertension      > Client reports the taking the following medications:  Current Outpatient Prescriptions   Medication     amLODIPine (NORVASC) 5 MG tablet     escitalopram (LEXAPRO) 10 MG tablet     hydrOXYzine (ATARAX) 25 MG tablet     MIRTAZAPINE PO     multivitamin, therapeutic with minerals (THERA-VIT-M) TABS tablet     nicotine (NICODERM CQ) 14 MG/24HR 24 hr patch     nicotine (NICODERM CQ) 7 MG/24HR 24 hr patch     thiamine 100 MG tablet   Added: Bupropion from appointment with Psychiatrist.     DIMENSION 3:  " Emotional/Behavioral/Cognitive Conditions and Complications  The degree to which any condition or complications are likely to interfere with treatment for substance abuse or with function in significant life areas and the likelihood of risk of harm to self or others.     Emotional/Behavioral - Current Risk Factor:  1    DSM-5 Diagnoses:   Official diagnosis (es) per Luis Armando Lopez MA, [facility], [facility] on 10/08/18;   > 296.32 (F33.1) Major Depressive Disorder, Recurrent Episode, Moderate   > Client reports experiencing;   1. Depressed mood most of the day, nearly every day, as indicated by either subjective report (e.g., feels sad, empty, hopeless) or observation made by others (e.g., appears tearful). (Note: In children and adolescents, can be irritable mood.)  2. Markedly diminished interest or pleasure in all, or almost all, activities most of the day, nearly every day (as indicated by either subjective account or observation).  3. Significant weight loss when not dieting or weight gain (e.g., a change of more than 5% of body weight in a month), or decrease or increase in appetite nearly every day. (Note: In children, consider failure to make expected weight gain.)  4. Insomnia or hypersomnia nearly every day.  5. Fatigue or loss of energy nearly every day.  6. Feelings of worthlessness or excessive or inappropriate guilt (which may be delusional) nearly every day (not merely self-reproach or guilt about being sick).  7. Diminished ability to think or concentrate, or indecisiveness, nearly every day (either by subjective account or as observed by others).  8. Recurrent thoughts of death (not just fear of dying), recurrent suicidal ideation without a specific plan, or a suicide attempt or a specific plan for committing suicide.    > 300.02 (F41.1) Generalized Anxiety Disorder   9. Client has had excessive anxiety and worry (apprehensive expectation), occurring more days than not for at least 6 months, about a number of  "events or activities (such as work or school performance).   10. The Client finds it difficult to control the worry.   11. The anxiety and worry are associated with three (or more) of the following six symptoms (with at least some symptoms having been present for more days than not for the past 6 months): (Client reports having five of the six)  1. Restlessness or feeling keyed up or on edge.  2. Being easily fatigued.  3. Difficulty concentrating or mind going blank.  4. Irritability.  5. Sleep disturbance (difficulty falling or staying asleep, or restless, unsatisfying sleep).  1. Client reports the anxiety, worry, or physical symptoms have caused clinically significant distress or impairment in social, occupational, or other important areas of functioning.    Goals: Understand the relationship between mental health concerns and addiction.     Data: Client reports feeling the following two emotions today; \"smug and aggressive in a good way.\" Client rated the following MH symptoms on a scale of 1-10 (0=did not endorse): sleeplessness- 3, fatigue- 3, difficulty concentrating- 3, mood swings- 0, irritability- 3, sadness- 1, excessive sleep- 4, racing thoughts- 3, hopelessness- 0. Client denied suicidal ideation or self-injurious behavior.    > Client reports the following appointment with his psychiatrist; 01/17/19 W/ Dr. Miller @ [facility]. Client to check with his psychiatrist about getting a community psychotherapist referral while at this appointment.     > Client reports taking the following psychotropic medications; Hydroxyzine, Bupropion, and Lexapro.    >Client reports his risk factors are; \"MH diagnosis with lack of historical treatment of MI, early recovery, significant physical health concern (Celiacs disease), and unemployment\".    >Client reports his protective factors are; \"sense of responsibility to family, motivation towards receiving treatment, and sober support group participation\".     Guns in the " "home?: No.     DIMENSION 4:  Readiness to Change  Consider the amount of support and encouragement necessary to keep the client involved in treatment.     Readiness to Change - Current Risk Factor:  0    Goals:  Increase awareness with how substance use is conflicted with personal values and address any ambivalence to change.     Data: Client rated his motivation of the week on a scale of 1-10 (1=low, 10= high): 8. Client reports his main motivation for sobriety is \"my health\". Client reports \"finding out about a bar that gives a free drink for one of my sobriety chips\" is what blocks motivation for sobriety for him. Client reports using the following coping strategies that are helpful for his continued sobriety; \"mindfulness.\"      DIMENSION 5:  Relapse/Continued Use/Continued Problem Potential  Consider the degree to which the client recognizes relapse issues and has the skills to prevent relapse of either substance use or mental health problems.     Relapse/Continued Use/Continued Problem Potential - Current Risk Factor:  2    Goals:  Increase awareness of the disease concept of addiction and insight into personal relapse triggers and strategies to address.     Data: Client rated his \"Average Craving Rating\" on a scale of 1-10 (1=low, 10= high): 3. Client reports that the following things helped minimize his cravings this past week; \"not having any money and using mindfulness skills.\" Client reports that \"not having any money which causes more stress than anything\" was his biggest trigger for the week.    DIMENSION 6:  Recovery Environment  Consider the degree to which key areas of the client's life are supportive of or antagonistic to treatment participation and recovery.     Recovery Environment - Current Risk Factor: 2    Support group meetings attended this week: 6. Where: [facility] [employer].  If zero attendance, what s preventing you: N/A.    Do you currently have a sponsor: Yes  Did you have " "contact with your sponsor this week? Yes    Did family agree to attend family week:  NA    If yes: NA    Goals:  Increase sober support network and work on obtaining employment.     Data: Client rated their \"Living Situation\" on a scale of 1-10 (1=very helpful, 10=very stressful): 4 due to \"stress due to lack of income/money\". Client reports during the past week that he \"got a hold of a antonette from my last treatment\" as a way to expand his sober support network. Client reports he enjoyed, \"going to the gym\" for sober fun activities this past week. Client reports he is proud of himself for \"continuing to [employer] job leads\".     >Client reports that he and his wife live in an on campus apartment at [facility] where is wife attends school. Client reports everything is supportive.  Employment: Client reports he is unemployed. Client is actively looking for work and applying for jobs but hasn't been able to secure employment yet.   Volunteerism: Yes (explain) - \"Am the Trustee for one of my AA meetings\".     Intervention: Counselor/therapist used Behavioral Therapy, Cognitive Behavioral Therapy, Counselor Feedback, Education, Emotional management, Group Feedback, Mindfulness, Motivational Enhancement Therapy, Relapse Prevention, Twelve Step Facilitation, DBT and REBT.  >Client practiced the following skills in group session this week; mindfulness based stress reduction skills, along with breathing exercises. Writer provided client with verbal interventions including: validation, support, and psycho-education.    >Client practiced the following skills in group session this week;   Monday: Client was absent from group but was given the handouts \"What is Gratitude and 10 Tips to Fit Gratitude in Your Life\" on Tuesday and was able to read through it during break on Tuesday.   Tuesday: Client was taught \"Building Happiness, Gratitude Exercises, & Making a Appreciation List.\"    Wednesday: Client was taught " "\"Gratitude Journaling\" and client completed identifying \"Positive Traits\" assignment with fellow group members.    Assessment:    Stages of Change Model: Client is currently in the Action Stage.  >Client was observed using the body scan and reported it helped with relaxation and anxiety. Client was observed using breathing exercises that were covered again during this weeks group session and client reported it helped with relaxation. Client appeared very open to continued learning for lifelong sobriety.     >Client was able to \"teach back\" the skills he learned on what gratitude is, how he can practice gratitude, how he can build happiness, importance of making an appreciation list as it relates to better MH, and then how to identify his positive traits. Client was able to do a role play exercise with fellow group members regarding identifying each others positive traits along with different ways to show gratitude for one another. Client gained good insight into the the other aforementioned topics he learned this week in group.    Plan:   -Continue to attend 2-3 sober support group meetings each week (this includes non-12-step/AA alternative meetings).  -Client will meet with his therapist bi-weekly.  -Client will continue to work on treatment plan objectives.  -Client will follow all recommendations of counselor and any other medical provider which includes taking all medications as prescribed.    -Client will attend all weekly Phase 2 group sessions.  -Client to engage in sober activities each week.  -Client will spend at least an hour per day looking for a job as client identified this as a goal for him on his treatment plan.      Abelardo Kaur MA, LMFT, [facility]  "

## 2018-12-09 NOTE — ADDENDUM NOTE
Encounter addended by: Abelardo Kaur on: 12/9/2018 3:16 PM   Actions taken: Pend clinical note, Sign clinical note

## 2018-12-10 ENCOUNTER — HOSPITAL ENCOUNTER (OUTPATIENT)
Dept: BEHAVIORAL HEALTH | Facility: CLINIC | Age: 33
End: 2018-12-10
Attending: SOCIAL WORKER
Payer: COMMERCIAL

## 2018-12-10 PROCEDURE — H2035 A/D TX PROGRAM, PER HOUR: HCPCS | Mod: HQ

## 2018-12-11 ENCOUNTER — HOSPITAL ENCOUNTER (OUTPATIENT)
Dept: BEHAVIORAL HEALTH | Facility: CLINIC | Age: 33
End: 2018-12-11
Attending: SOCIAL WORKER
Payer: COMMERCIAL

## 2018-12-11 PROCEDURE — H2035 A/D TX PROGRAM, PER HOUR: HCPCS | Mod: HQ

## 2018-12-11 NOTE — PROGRESS NOTES
Case consultation:  D)  Therapist reviewed ct's case.  DOC alcohol.  MDD recurrent, moderate; DAIANA.  Ct has financial stressors. He is starting a new job and working to keep balance between work and rest.  Attending meetings.  P) Continue tx plan.    SHAY Meléndez, LICSW, [facility]  Clinical

## 2018-12-12 ENCOUNTER — HOSPITAL ENCOUNTER (OUTPATIENT)
Dept: BEHAVIORAL HEALTH | Facility: CLINIC | Age: 33
End: 2018-12-12
Attending: SOCIAL WORKER
Payer: COMMERCIAL

## 2018-12-12 PROCEDURE — H2035 A/D TX PROGRAM, PER HOUR: HCPCS | Mod: HQ

## 2018-12-12 NOTE — PROGRESS NOTES
"Key to reading dimensions:   -Bolded text in a dimension indicates information about a client at service initiation.  -Underlined portions in a dimension indicate important information that writer is carrying forward from week to week as a reminder.    CD ADULT Progress Note     Treatment Plan Review completed on:  12/12/2018     Attendance Dates: 12/10/18, 12/11/18, & 12/12/18.     Total # of P1 Group Sessions: 16.  Total # of P2 Group Sessions: 9  Total # of P3 Group Sessions: 0  Total # of 1:1 Sessions: 3     Length of stay/projected discharge date: 02/12/19.      MONDAY TUESDAY WEDNESDAY THURSDAY FRIDAY SATURDAY SUNDAY TOTAL HOURS   Group Therapy 2  2 2     6   Specialty Groups*            1:1            Family Program           [place]             Phase III             Absent (place \"X\")             Total's 2 2 2     6     *Specialty Groups include Mental Health Care, Assertiveness and Communication, Sobriety Maintenance Skills, Spiritual Care, Stress Management, Relapse Prevention, Family Systems.                             Learning Style:    Visual  Hands on  Verbal  Demonstration    Staff member contributing: Abelardo Kaur MA, LMFT, [facility]     Received supervision: No.    Client contributed to goals and plan: Yes    Client received a signed copy of treatment plan/revised plan:  Yes     Changes to Treatment Plan: No    Client agrees with plan/revised plan:  Yes     Any changes in Vulnerable Adult Status:  No    1) Care Coordination Activities: Yes (explain) - Monthly case consultation with [facility].  2) Medical, Mental Health and other appointments the client attended: SEE DIM's II & III below.  3) Medication issues: No None.  4) Physical and mental health problems: SEE DIM's II & III below.  5) Review and evaluation of the individual abuse prevention plan: N/A     Substance Use Disorders:    303.90 (F10.20) Alcohol Use Disorder Severe (Early Remission)    [facility] Risk Ratings and Data     DIMENSION 1: Acute " "Intoxication/Withdrawal  The client's ability to cope with withdrawal symptoms and current state of intoxication     Acute Intoxication/Withdrawal - Current Risk Factor:  0    Reporting a sober date of: 09/11/18 for alcohol.    Goals:  Develop effective strategies to maintain sobriety and to report any substance or alcohol use to both counselor and group.     Data: Client denied nor demonstrated any signs/symptomology of intoxication and/or withdrawal. Client denied having any symptoms of PAW. Client confirmed his last use date was on 09/10/18 for alcohol.     DIMENSION 2:  Biomedical Conditions and Complaints  The degree to which any physical disorder would interfere with treatment for substance abuse and the client's ability to tolerate any related discomfort     Biomedical Conditions and Complaints - Current Risk Factor:  0    Goals: Disclose CD status to medical providers and follow up with medical interventions while in DOP.     Data: Client rated his overall health on a scale of 1-10 (1=poor, 10=excellent): 8. Client reports doing the following for self-care during the past week; \"personal hygiene.\" Client reports the following changes to his physical health over the past week; \"None\". Client reports the following upcoming doctor s appointments: None.     > Client reports the following physical health conditions;  Past Medical History:   Diagnosis Date     ACD (adult celiac disease)      Anxiety      Celiac disease      Depressive disorder      Hypertension      > Client reports the taking the following medications:  Current Outpatient Prescriptions   Medication     amLODIPine (NORVASC) 5 MG tablet     escitalopram (LEXAPRO) 10 MG tablet     hydrOXYzine (ATARAX) 25 MG tablet     MIRTAZAPINE PO     multivitamin, therapeutic with minerals (THERA-VIT-M) TABS tablet     nicotine (NICODERM CQ) 14 MG/24HR 24 hr patch     nicotine (NICODERM CQ) 7 MG/24HR 24 hr patch     thiamine 100 MG tablet   Added: Bupropion from " appointment with Psychiatrist.     DIMENSION 3:  Emotional/Behavioral/Cognitive Conditions and Complications  The degree to which any condition or complications are likely to interfere with treatment for substance abuse or with function in significant life areas and the likelihood of risk of harm to self or others.     Emotional/Behavioral - Current Risk Factor:  1    DSM-5 Diagnoses:   [facility] (es[facility];   > 296.32 (F33.1) Major Depressive Disorder, Recurrent Episode, Moderate   > Client reports experiencing;   1. Depressed mood most of the day, nearly every day, as indicated by either subjective report (e.g., feels sad, empty, hopeless) or observation made by others (e.g., appears tearful). (Note: In children and adolescents, can be irritable mood.)  2. Markedly diminished interest or pleasure in all, or almost all, activities most of the day, nearly every day (as indicated by either subjective account or observation).  3. Significant weight loss when not dieting or weight gain (e.g., a change of more than 5% of body weight in a month), or decrease or increase in appetite nearly every day. (Note: In children, consider failure to make expected weight gain.)  4. Insomnia or hypersomnia nearly every day.  5. Fatigue or loss of energy nearly every day.  6. Feelings of worthlessness or excessive or inappropriate guilt (which may be delusional) nearly every day (not merely self-reproach or guilt about being sick).  7. Diminished ability to think or concentrate, or indecisiveness, nearly every day (either by subjective account or as observed by others).  8. Recurrent thoughts of death (not just fear of dying), recurrent suicidal ideation without a specific plan, or a suicide attempt or a specific plan for committing suicide.    > 300.02 (F41.1) Generalized Anxiety Disorder   9. Client has had excessive anxiety and worry (apprehensive expectation), occurring more days  "than not for at least 6 months, about a number of events or activities (such as work or school performance).   10. The Client finds it difficult to control the worry.   11. The anxiety and worry are associated with three (or more) of the following six symptoms (with at least some symptoms having been present for more days than not for the past 6 months): (Client reports having five of the six)  1. Restlessness or feeling keyed up or on edge.  2. Being easily fatigued.  3. Difficulty concentrating or mind going blank.  4. Irritability.  5. Sleep disturbance (difficulty falling or staying asleep, or restless, unsatisfying sleep).  1. Client reports the anxiety, worry, or physical symptoms have caused clinically significant distress or impairment in social, occupational, or other important areas of functioning.    Goals: Understand the relationship between mental health concerns and addiction.     Data: Client reports feeling the following two emotions today; \"blissful and smug.\" Client rated the following MH symptoms on a scale of 1-10 (0=did not endorse): sleeplessness- 2, fatigue- 2, difficulty concentrating- 2, mood swings- 2, irritability- 3, sadness- 1, excessive sleep- 2, racing thoughts- 3, hopelessness- 1. Client denied suicidal ideation or self-injurious behavior.    > Client reports the following appointment with his psychiatrist; 01/17/19 W/ Dr. Miller @ [facility]. Client to check with his psychiatrist about getting a community psychotherapist referral while at this appointment.     > Client reports taking the following psychotropic medications; Hydroxyzine, Bupropion, and Lexapro.    >Client reports his risk factors are; \"MH diagnosis with lack of historical treatment of MI, early recovery, significant physical health concern (Celiacs disease), and unemployment\".    >Client reports his protective factors are; \"sense of responsibility to family, motivation towards receiving treatment, and sober support group " "participation\".     Guns in the home?: No.     DIMENSION 4:  Readiness to Change  Consider the amount of support and encouragement necessary to keep the client involved in treatment.     Readiness to Change - Current Risk Factor:  0    Goals:  Increase awareness with how substance use is conflicted with personal values and address any ambivalence to change.     Data: Client rated his motivation of the week on a scale of 1-10 (1=low, 10= high): 10. Client reports his main motivation for sobriety is \"my health and future\". Client reports \"having cash on hand\" is what can block motivation for sobriety for him. Client reports using the following coping strategies that are helpful for his continued sobriety; \"calling or texting sober peers, breathing exercises and mindfulness coping skills.\" Client reports wanting to learn the following coping skills and/or to practice: \"want to get better reaching out to others.\"      DIMENSION 5:  Relapse/Continued Use/Continued Problem Potential  Consider the degree to which the client recognizes relapse issues and has the skills to prevent relapse of either substance use or mental health problems.     Relapse/Continued Use/Continued Problem Potential - Current Risk Factor:  2    Goals:  Increase awareness of the disease concept of addiction and insight into personal relapse triggers and strategies to address.     Data: Client rated his \"Average Craving Rating\" on a scale of 1-10 (1=low, 10= high): 2. Client reports that the following things helped minimize his cravings this past week; \"calling sober people and going to meetings.\" Client reports that \"now having cash and money on hand\" was his biggest trigger for the week.    DIMENSION 6:  Recovery Environment  Consider the degree to which key areas of the client's life are supportive of or antagonistic to treatment participation and recovery.     Recovery Environment - Current Risk Factor: 2    Support group meetings attended this " "week: 5. Where: [facility], & [facility] [employer].  If zero attendance, what s preventing you: N/A.    Do you currently have a sponsor: Yes  Did you have contact with your sponsor this week? Yes    Did family agree to attend family week:  NA    If yes: NA    Goals:  Increase sober support network and work on obtaining employment.     Data: Client rated their \"Living Situation\" on a scale of 1-10 (1=very helpful, 10=very stressful): 4 due to \"stress due to lack of money but it is getting better now that I am working again\". Client reports during the past week that he \"went to different meetings than usual\" as a way to expand his sober support network. Client reports he enjoyed, \"meeting with sponsor\" for sober fun activities this past week. Client reports he is proud of himself for \"continuing to [employer] job leads\".     >Client reports that he and his wife live in an on campus apartment at [facility] where is wife attends school. Client reports everything is supportive.  Employment: Client reports he is employed full-time. 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   Volunteerism: Yes (explain) - \"Am the Trustee for one of my AA meetings\".     Intervention: Counselor/therapist used Behavioral Therapy, Cognitive Behavioral Therapy, Counselor Feedback, Education, Emotional management, Group Feedback, Mindfulness, Motivational Enhancement Therapy, Relapse Prevention, Twelve Step Facilitation, DBT and REBT.  >Client practiced the following skills in group session this week; mindfulness based stress reduction skills, along with breathing exercises. Writer provided client with verbal interventions including: validation, support, and psycho-education.    >Client practiced the following skills in group session this week;   Monday-Wednesday: Client was taught \"Relationship Growth Activity, I-Statements, Assertive Communication and I-Statements, " "Reflections/Using Reflective Listening, Problem Solving, Fair Fighting Rules, Power and Control Wheel, Conflict De-Escalation Tips, English Translated into English (what people say and what they really mean), and, Relationship Building and Identifying Positive Qualities in Others\" assignments in group. Client then completed \"Forming Stable Relationships\" Assignment in group.     Assessment:    Stages of Change Model: Client is currently in the Action Stage.  >Client was observed using the body scan and reported it helped with relaxation and anxiety. Client was observed using breathing exercises that were covered again during this weeks group session and client reported it helped with relaxation. Client appeared very open to continued learning for lifelong sobriety.     >Client was able to \"teach back\" the skills he learned on identifying positive qualities in others, forming healthy relationships, healthy communication in relationships and how to use conflict de-escalation within relationships.   Client was able to do a role play exercise with fellow group members regarding identifying each others positive qualities, using I-Statements with with one another, identifying when the other is saying something that doesn't match up with what they are really saying, conflict de-escalation, and using problem solving skills with one other. Client gained good insight into the the other aforementioned topics he learned this week in group.    Plan:   -Continue to attend 2-3 sober support group meetings each week (this includes non-12-step/AA alternative meetings).  -Client will meet with his therapist bi-weekly.  -Client will continue to work on treatment plan objectives.  -Client will follow all recommendations of counselor and any other medical provider which includes taking all medications as prescribed.    -Client will attend all weekly Phase 2 group sessions.  -Client to engage in sober activities each week.  -Client will " spend at least an hour per day looking for a job as client identified this as a goal for him on his treatment plan.      Abelardo Kaur MA, LMFT, [facility]

## 2018-12-17 ENCOUNTER — HOSPITAL ENCOUNTER (OUTPATIENT)
Dept: BEHAVIORAL HEALTH | Facility: CLINIC | Age: 33
End: 2018-12-17
Attending: SOCIAL WORKER
Payer: COMMERCIAL

## 2018-12-17 PROCEDURE — H2035 A/D TX PROGRAM, PER HOUR: HCPCS | Mod: HQ

## 2018-12-18 ENCOUNTER — HOSPITAL ENCOUNTER (OUTPATIENT)
Dept: BEHAVIORAL HEALTH | Facility: CLINIC | Age: 33
End: 2018-12-18
Attending: SOCIAL WORKER
Payer: COMMERCIAL

## 2018-12-18 PROCEDURE — H2035 A/D TX PROGRAM, PER HOUR: HCPCS | Mod: HQ

## 2018-12-19 ENCOUNTER — HOSPITAL ENCOUNTER (OUTPATIENT)
Dept: BEHAVIORAL HEALTH | Facility: CLINIC | Age: 33
End: 2018-12-19
Attending: SOCIAL WORKER
Payer: COMMERCIAL

## 2018-12-19 PROCEDURE — H2035 A/D TX PROGRAM, PER HOUR: HCPCS | Mod: HQ

## 2018-12-19 NOTE — PROGRESS NOTES
"Key to reading dimensions:   -Bolded text in a dimension indicates information about a client at service initiation.  -Underlined portions in a dimension indicate important information that writer is carrying forward from week to week as a reminder.    CD ADULT Progress Note     Treatment Plan Review completed on:  12/19/2018     Attendance Dates: 12/17/18, 12/18/18, & 12/19/18.     Total # of P1 Group Sessions: 16.  Total # of P2 Group Sessions: 12  Total # of P3 Group Sessions: 0  Total # of 1:1 Sessions: 3     Length of stay/projected discharge date: 02/12/19.      MONDAY TUESDAY WEDNESDAY THURSDAY FRIDAY SATURDAY SUNDAY TOTAL HOURS   Group Therapy 2  2 2     6   Specialty Groups*            1:1            Family Program           [place]             Phase III             Absent (place \"X\")             Total's 2 2 2     6     *Specialty Groups include Mental Health Care, Assertiveness and Communication, Sobriety Maintenance Skills, Spiritual Care, Stress Management, Relapse Prevention, Family Systems.                             Learning Style:    Visual  Hands on  Verbal  Demonstration    Staff member contributing: Abelardo Kaur MA, LMFT, [facility]     Received supervision: No.    Client contributed to goals and plan: Yes    Client received a signed copy of treatment plan/revised plan:  Yes     Changes to Treatment Plan: No    Client agrees with plan/revised plan:  Yes     Any changes in Vulnerable Adult Status:  No    1) Care Coordination Activities: Yes (explain) - With fellow Phase III counselor to coordinate transfering client to her group on 01/08/19.   2) Medical, Mental Health and other appointments the client attended: SEE DIM's II & III below.  3) Medication issues: No None.  4) Physical and mental health problems: SEE DIM's II & III below.  5) Review and evaluation of the individual abuse prevention plan: N/A     Substance Use Disorders:    303.90 (F10.20) Alcohol Use Disorder Severe (Early " "Remission)    [facility] Risk Ratings and Data     DIMENSION 1: Acute Intoxication/Withdrawal  The client's ability to cope with withdrawal symptoms and current state of intoxication     Acute Intoxication/Withdrawal - Current Risk Factor:  0    Reporting a sober date of: 09/11/18 for alcohol.    Goals:  Develop effective strategies to maintain sobriety and to report any substance or alcohol use to both counselor and group.     Data: Client denied nor demonstrated any signs/symptomology of intoxication and/or withdrawal. Client denied having any symptoms of PAW. Client confirmed his last use date was on 09/10/18 for alcohol.     DIMENSION 2:  Biomedical Conditions and Complaints  The degree to which any physical disorder would interfere with treatment for substance abuse and the client's ability to tolerate any related discomfort     Biomedical Conditions and Complaints - Current Risk Factor:  0    Goals: Disclose CD status to medical providers and follow up with medical interventions while in DOP.     Data: Client rated his overall health on a scale of 1-10 (1=poor, 10=excellent): 8. Client reports doing the following for self-care during the past week; \"getting to meetings.\" Client reports the following changes to his physical health over the past week; \"None\". Client reports the following upcoming doctor s appointments: None.     > Client reports the following physical health conditions;  Past Medical History:   Diagnosis Date     ACD (adult celiac disease)      Anxiety      Celiac disease      Depressive disorder      Hypertension      > Client reports the taking the following medications:  Current Outpatient Prescriptions   Medication     amLODIPine (NORVASC) 5 MG tablet     escitalopram (LEXAPRO) 10 MG tablet     hydrOXYzine (ATARAX) 25 MG tablet     MIRTAZAPINE PO     multivitamin, therapeutic with minerals (THERA-VIT-M) TABS tablet     nicotine (NICODERM CQ) 14 MG/24HR 24 hr patch     nicotine (NICODERM CQ) 7 " MG/24HR 24 hr patch     thiamine 100 MG tablet   Added: Bupropion from appointment with Psychiatrist.     DIMENSION 3:  Emotional/Behavioral/Cognitive Conditions and Complications  The degree to which any condition or complications are likely to interfere with treatment for substance abuse or with function in significant life areas and the likelihood of risk of harm to self or others.     Emotional/Behavioral - Current Risk Factor:  1    DSM-5 Diagnoses:   Official diagnosis (es) per Luis Armando Lopez MA, [facility], [facility] on 10/08/18;   > 296.32 (F33.1) Major Depressive Disorder, Recurrent Episode, Moderate   > Client reports experiencing;   1. Depressed mood most of the day, nearly every day, as indicated by either subjective report (e.g., feels sad, empty, hopeless) or observation made by others (e.g., appears tearful). (Note: In children and adolescents, can be irritable mood.)  2. Markedly diminished interest or pleasure in all, or almost all, activities most of the day, nearly every day (as indicated by either subjective account or observation).  3. Significant weight loss when not dieting or weight gain (e.g., a change of more than 5% of body weight in a month), or decrease or increase in appetite nearly every day. (Note: In children, consider failure to make expected weight gain.)  4. Insomnia or hypersomnia nearly every day.  5. Fatigue or loss of energy nearly every day.  6. Feelings of worthlessness or excessive or inappropriate guilt (which may be delusional) nearly every day (not merely self-reproach or guilt about being sick).  7. Diminished ability to think or concentrate, or indecisiveness, nearly every day (either by subjective account or as observed by others).  8. Recurrent thoughts of death (not just fear of dying), recurrent suicidal ideation without a specific plan, or a suicide attempt or a specific plan for committing suicide.    > 300.02 (F41.1) Generalized Anxiety Disorder   9. Client has had  "excessive anxiety and worry (apprehensive expectation), occurring more days than not for at least 6 months, about a number of events or activities (such as work or school performance).   10. The Client finds it difficult to control the worry.   11. The anxiety and worry are associated with three (or more) of the following six symptoms (with at least some symptoms having been present for more days than not for the past 6 months): (Client reports having five of the six)  1. Restlessness or feeling keyed up or on edge.  2. Being easily fatigued.  3. Difficulty concentrating or mind going blank.  4. Irritability.  5. Sleep disturbance (difficulty falling or staying asleep, or restless, unsatisfying sleep).  1. Client reports the anxiety, worry, or physical symptoms have caused clinically significant distress or impairment in social, occupational, or other important areas of functioning.    Goals: Understand the relationship between mental health concerns and addiction.     Data: Client reports feeling the following two emotions today; \"blissful and smug.\" Client rated the following MH symptoms on a scale of 1-10 (0=did not endorse): sleeplessness- 2, fatigue- 1, difficulty concentrating- 1, mood swings- 2, irritability- 1, sadness- 1, excessive sleep- 1, racing thoughts- 1, hopelessness- 1. Client denied suicidal ideation or self-injurious behavior.    > Client reports the following appointment with his psychiatrist; 01/17/19 W/ Dr. Paul MD @ [facility]. Client to check with his psychiatrist about getting a community psychotherapist referral while at this appointment for a therapist at [facility] as well.     > Client reports taking the following psychotropic medications; Hydroxyzine, Bupropion, and Lexapro.    >Client reports his risk factors are; \"MH diagnosis with lack of historical treatment of MI, early recovery, significant physical health concern (Celiacs disease), and unemployment\".    >Client reports his protective " "factors are; \"sense of responsibility to family, motivation towards receiving treatment, and sober support group participation\".     Guns in the home?: No.     DIMENSION 4:  Readiness to Change  Consider the amount of support and encouragement necessary to keep the client involved in treatment.     Readiness to Change - Current Risk Factor:  0    Goals:  Increase awareness with how substance use is conflicted with personal values and address any ambivalence to change.     Data: Client rated his motivation of the week on a scale of 1-10 (1=low, 10= high): 9. Client reports his main motivation for sobriety is \"my health \". Client reports \"nothing\" is what blocks motivation for sobriety for him. Client reports using the following coping strategies that are helpful for his continued sobriety; \"going to meetings.\"      DIMENSION 5:  Relapse/Continued Use/Continued Problem Potential  Consider the degree to which the client recognizes relapse issues and has the skills to prevent relapse of either substance use or mental health problems.     Relapse/Continued Use/Continued Problem Potential - Current Risk Factor:  2    Goals:  Increase awareness of the disease concept of addiction and insight into personal relapse triggers and strategies to address.     Data: Client rated his \"Average Craving Rating\" on a scale of 1-10 (1=low, 10= high): 2. Client reports that the following things helped minimize his cravings this past week; \"going to meetings.\" Client denied having any triggers this past week.    DIMENSION 6:  Recovery Environment  Consider the degree to which key areas of the client's life are supportive of or antagonistic to treatment participation and recovery.     Recovery Environment - Current Risk Factor: 2    Support group meetings attended this week: 6. Where: [facility] [employer].  If zero attendance, what s preventing you: N/A.    Do you currently have a sponsor: Yes  Did you have contact with your " "sponsor this week? Yes    Did family agree to attend family week:  NA    If yes: NA    Goals:  Increase sober support network. (Completed obtaining employment)    Data: Client rated their \"Living Situation\" on a scale of 1-10 (1=very helpful, 10=very stressful): 2. Client reports during the past week that he \"met new people at AA meetings and at MAG-A meetings\" as a way to expand his sober support network. Client reports he enjoyed, \"got a massage\" for sober fun activities this past week. Client reports he is proud of himself for \"getting two jobs and getting my 3-month medallion\".     >Client reports that he and his wife live in an on campus apartment at [facility] where is wife attends school. Client reports everything is supportive.  Employment: Client reports he is employed full-time. Client has secured two jobs in last couple of weeks and is working a little over FT.   Volunteerism: Yes (explain) - \"Am the Trustee for one of my AA meetings\".     Intervention: Counselor/therapist used Behavioral Therapy, Cognitive Behavioral Therapy, Counselor Feedback, Education, Emotional management, Group Feedback, Mindfulness, Motivational Enhancement Therapy, Relapse Prevention, Twelve Step Facilitation, DBT and REBT.  >Client practiced the following skills in group session this week; mindfulness based stress reduction skills, along with breathing exercises. Writer provided client with verbal interventions including: validation, support, and psycho-education.    > 12/17/18 - Client completed and presented to the group assignments titled \"What Would My Ideal Life Look Like\", \"Self-Esteem\", and \"Self [place]\".    Assessment:    Stages of Change Model: Client is currently in the Action Stage.  >Client was observed using the body scan and reported it helped with relaxation and anxiety. Client was observed using breathing exercises that were covered again during this weeks group session and client reported it " helped with relaxation. Client appeared very open to continued learning for lifelong sobriety.     >Client appeared to gain insight into what his ideal life would look like being in recovery and also appeared to gain insight into how his self-esteem/self-worth is tied into his MH along with how low self-esteem/self worth negatively impacts recovery.    Plan:   -Client will attend all weekly Phase 3 group sessions with Suzi Juares MA, [facility] starting on Tuesday 01/08/19 at 5:30PM.  -Continue to attend 2-3 sober support group meetings each week (this includes non-12-step/AA alternative meetings).  -Client will meet with his therapist bi-weekly. Client will need to talk to Shayan about getting set-up with a therapist as he see's a psychiatrist there already.   -Client will continue to work on treatment plan objectives.  -Client will follow all recommendations of counselor and any other medical provider which includes taking all medications as prescribed.    -Client to engage in sober activities each week.      Abelardo Kaur MA, LMSCOTTY, [facility]

## 2018-12-25 NOTE — ADDENDUM NOTE
Encounter addended by: Abelardo Kaur on: 12/25/2018 9:29 AM   Actions taken: Pend clinical note, Sign clinical note

## 2019-01-08 ENCOUNTER — HOSPITAL ENCOUNTER (OUTPATIENT)
Dept: BEHAVIORAL HEALTH | Facility: CLINIC | Age: 34
End: 2019-01-08
Attending: SOCIAL WORKER
Payer: COMMERCIAL

## 2019-01-08 PROCEDURE — H2035 A/D TX PROGRAM, PER HOUR: HCPCS | Mod: HQ

## 2019-01-09 NOTE — PROGRESS NOTES
"Key to reading dimensions:   -Bolded text in a dimension indicates information about a client at service initiation.  -Underlined portions in a dimension indicate important information that writer is carrying forward from week to week as a reminder.    CD ADULT Progress Note     Treatment Plan Review completed on:  1.10.19    Attendance Dates: 1.8.2019     Total # of P1 Group Sessions: 16.  Total # of P2 Group Sessions: 12  Total # of P3 Group Sessions: 1  Total # of 1:1 Sessions: 4     Length of stay/projected discharge date: 02/12/19.      MONDAY TUESDAY WEDNESDAY THURSDAY FRIDAY SATURDAY SUNDAY TOTAL HOURS   Group Therapy   2      2   Specialty Groups*            1:1            Family Program           [place]             Phase III             Absent (place \"X\")             Total's  2      2     *Specialty Groups include Mental Health Care, Assertiveness and Communication, Sobriety Maintenance Skills, Spiritual Care, Stress Management, Relapse Prevention, Family Systems.                             Learning Style:    Visual  Hands on  Verbal  Demonstration    Staff member contributing: Abelardo Kaur MA, KRISS, KELSEYC     Received supervision: No.    Client contributed to goals and plan: Yes    Client received a signed copy of treatment plan/revised plan:  Yes     Changes to Treatment Plan: No    Client agrees with plan/revised plan:  Yes     Any changes in Vulnerable Adult Status:  No    1) Care Coordination Activities: Yes (explain) - With fellow Phase III counselor to coordinate transfering client to her group on 01/08/19.   2) Medical, Mental Health and other appointments the client attended: SEE DIM's II & III below.  3) Medication issues: No None.  4) Physical and mental health problems: SEE DIM's II & III below.  5) Review and evaluation of the individual abuse prevention plan: N/A     Substance Use Disorders:    303.90 (F10.20) Alcohol Use Disorder Severe (Early Remission)    [facility] Risk Ratings and Data " "    DIMENSION 1: Acute Intoxication/Withdrawal  The client's ability to cope with withdrawal symptoms and current state of intoxication     Acute Intoxication/Withdrawal - Current Risk Factor:  0    Reporting a sober date of: 09/11/18 for alcohol.    Goals:  Develop effective strategies to maintain sobriety and to report any substance or alcohol use to both counselor and group.     Data: Client denied nor demonstrated any signs/symptomology of intoxication and/or withdrawal. Client denied having any symptoms of PAW. Client confirmed his last use date was on 09/10/18 for alcohol.     DIMENSION 2:  Biomedical Conditions and Complaints  The degree to which any physical disorder would interfere with treatment for substance abuse and the client's ability to tolerate any related discomfort     Biomedical Conditions and Complaints - Current Risk Factor:  0    Goals: Disclose CD status to medical providers and follow up with medical interventions while in DOP.     Data: Client rated his overall health on a scale of 1-10 (1=poor, 10=excellent): 9. Client reports doing the following for self-care during the past week; \"getting to meetings.\" Client reports the following changes to his physical health over the past week; \"None\". Client reports the following upcoming doctor s appointments: None.     > Client reports the following physical health conditions;  Past Medical History:   Diagnosis Date     ACD (adult celiac disease)      Anxiety      Celiac disease      Depressive disorder      Hypertension      > Client reports the taking the following medications:  Current Outpatient Prescriptions   Medication     amLODIPine (NORVASC) 5 MG tablet     escitalopram (LEXAPRO) 10 MG tablet     hydrOXYzine (ATARAX) 25 MG tablet     MIRTAZAPINE PO     multivitamin, therapeutic with minerals (THERA-VIT-M) TABS tablet     nicotine (NICODERM CQ) 14 MG/24HR 24 hr patch     nicotine (NICODERM CQ) 7 MG/24HR 24 hr patch     thiamine 100 MG " tablet   Added: Bupropion from appointment with Psychiatrist.     DIMENSION 3:  Emotional/Behavioral/Cognitive Conditions and Complications  The degree to which any condition or complications are likely to interfere with treatment for substance abuse or with function in significant life areas and the likelihood of risk of harm to self or others.     Emotional/Behavioral - Current Risk Factor:  1    DSM-5 Diagnoses:   Official diagnosis (es) per Luis Armando Lopez MA, [facility], [facility] on 10/08/18;   > 296.32 (F33.1) Major Depressive Disorder, Recurrent Episode, Moderate   > Client reports experiencing;   1. Depressed mood most of the day, nearly every day, as indicated by either subjective report (e.g., feels sad, empty, hopeless) or observation made by others (e.g., appears tearful). (Note: In children and adolescents, can be irritable mood.)  2. Markedly diminished interest or pleasure in all, or almost all, activities most of the day, nearly every day (as indicated by either subjective account or observation).  3. Significant weight loss when not dieting or weight gain (e.g., a change of more than 5% of body weight in a month), or decrease or increase in appetite nearly every day. (Note: In children, consider failure to make expected weight gain.)  4. Insomnia or hypersomnia nearly every day.  5. Fatigue or loss of energy nearly every day.  6. Feelings of worthlessness or excessive or inappropriate guilt (which may be delusional) nearly every day (not merely self-reproach or guilt about being sick).  7. Diminished ability to think or concentrate, or indecisiveness, nearly every day (either by subjective account or as observed by others).  8. Recurrent thoughts of death (not just fear of dying), recurrent suicidal ideation without a specific plan, or a suicide attempt or a specific plan for committing suicide.    > 300.02 (F41.1) Generalized Anxiety Disorder   9. Client has had excessive anxiety and worry (apprehensive  "expectation), occurring more days than not for at least 6 months, about a number of events or activities (such as work or school performance).   10. The Client finds it difficult to control the worry.   11. The anxiety and worry are associated with three (or more) of the following six symptoms (with at least some symptoms having been present for more days than not for the past 6 months): (Client reports having five of the six)  1. Restlessness or feeling keyed up or on edge.  2. Being easily fatigued.  3. Difficulty concentrating or mind going blank.  4. Irritability.  5. Sleep disturbance (difficulty falling or staying asleep, or restless, unsatisfying sleep).  1. Client reports the anxiety, worry, or physical symptoms have caused clinically significant distress or impairment in social, occupational, or other important areas of functioning.    Goals: Understand the relationship between mental health concerns and addiction.     Data: Client reports feeling the following two emotions today; \"blissful and smug.\" Client rated the following MH symptoms on a scale of 1-10 (0=did not endorse): sleeplessness- 3, fatigue- 1, difficulty concentrating- 1, mood swings- 1, irritability- 2, sadness- 1, excessive sleep- 2, racing thoughts- 1, hopelessness- 1. Client denied suicidal ideation or self-injurious behavior.    > Client reports the following appointment with his psychiatrist; 01/17/19 W/ Dr. Paul MD @ [facility]. Client to check with his psychiatrist about getting a community psychotherapist referral while at this appointment for a therapist at [facility] as well.     > Client reports taking the following psychotropic medications; Hydroxyzine, Bupropion, and Lexapro.    >Client reports his risk factors are; \"MH diagnosis with lack of historical treatment of MI, early recovery, significant physical health concern (Celiacs disease), and unemployment\".    >Client reports his protective factors are; \"sense of responsibility to " "family, motivation towards receiving treatment, and sober support group participation\".     Guns in the home?: No.     DIMENSION 4:  Readiness to Change  Consider the amount of support and encouragement necessary to keep the client involved in treatment.     Readiness to Change - Current Risk Factor:  0    Goals:  Increase awareness with how substance use is conflicted with personal values and address any ambivalence to change.     Data: Client rated his motivation of the week on a scale of 1-10 (1=low, 10= high): 8. Client reports his main motivation for sobriety is \"my health \". Client reports \"nothing\" is what blocks motivation for sobriety for him. Client reports using the following coping strategies that are helpful for his continued sobriety; \"going to meetings\" and \"breathing\".     DIMENSION 5:  Relapse/Continued Use/Continued Problem Potential  Consider the degree to which the client recognizes relapse issues and has the skills to prevent relapse of either substance use or mental health problems.     Relapse/Continued Use/Continued Problem Potential - Current Risk Factor:  2    Goals:  Increase awareness of the disease concept of addiction and insight into personal relapse triggers and strategies to address.     Data: Client rated his \"Average Craving Rating\" on a scale of 1-10 (1=low, 10= high): 1. Client reports that the following things helped minimize his cravings this past week; \"working.\" Client denied having any triggers this past week.    DIMENSION 6:  Recovery Environment  Consider the degree to which key areas of the client's life are supportive of or antagonistic to treatment participation and recovery.     Recovery Environment - Current Risk Factor: 2    Support group meetings attended this week: 4. Where: [place], & [employer].  If zero attendance, what s preventing you: N/A.    Do you currently have a sponsor: Yes  Did you have contact with your sponsor this week? Yes    Did family " "agree to attend family week:  NA    If yes: NA    Goals:  Increase sober support network. (Completed obtaining employment)    Data: Client rated their \"Living Situation\" on a scale of 1-10 (1=very helpful, 10=very stressful): 2. Client reports during the past week that he \"met new people at AA meetings and at MAG-A meetings\" as a way to expand his sober support network. Client reports he enjoyed, \"got a massage\" for sober fun activities this past week. Client reports he is proud of himself for \"getting two jobs and getting my 3-month medallion\".     >Client reports that he and his wife live in an on campus apartment at [facility] where is wife attends school. Client reports everything is supportive.  Employment: Client reports he is employed full-time. Client has secured two jobs in last couple of weeks and is working a little over FT.   Volunteerism: Yes (explain) - \"Am the Trustee for one of my AA meetings\".     Intervention: Counselor/therapist used Behavioral Therapy, Cognitive Behavioral Therapy, Counselor Feedback, Education, Emotional management, Group Feedback, Mindfulness, Motivational Enhancement Therapy, Relapse Prevention, Twelve Step Facilitation, DBT and REBT.    1/10/2019:  Client participated in checking in process, participated in graduation ceremony and client completed treatment     Assessment:    Stages of Change Model: Client is currently in the Action Stage.    >This is client's first group meeting with his new counselor.  Client was very responsive and cooperative during group    Plan:   -Client will attend all weekly Phase 3 group sessions   -Continue to attend 2-3 sober support group meetings each week (this includes non-12-step/AA alternative meetings).  -Client will meet with his therapist bi-weekly. Client will need to talk to Shayan about getting set-up with a therapist as he see's a psychiatrist there already.   -Client will continue to work on treatment plan " objectives.  -Client will follow all recommendations of counselor and any other medical provider which includes taking all medications as prescribed.    -Client to engage in sober activities each week.      Neela Camp MS, LADC

## 2019-01-12 NOTE — PROGRESS NOTES
Visit Date:   01/08/2019      CHEMICAL DEPENDENCY TRANSITION SUMMARY      EVALUATION COUNSELOR:  Rut Allred MA, LADC.     TREATMENT COUNSELOR:  Abelardo Kaur MA, KRISS, ROSEMARY and Suzi Juares MA, LADC (aftercare).     REFERRAL SOURCE:  [facility] and self.       PROGRAM:  [facility] Co-occurring Program.     ADMISSION DATE:  10/17/2018     TRANSITION DATE:  01/08/2019.   LAST SESSION DATE:  12/19/2018.    Note:  There is disparity between last session date and transition date due to the client starting Phase III aftercare with a different provider and that the providers Phase III aftercare was regularly scheduled on Christmas and New Year's days in which the outpatient program was closed.  The client was able to start on the very first aftercare group date of the new year, which was 01/08/2019 with Suzi Juares MA, LADC.      ADMISSION DIAGNOSES:  Alcohol use disorder, severe (F10.20) and tobacco use disorder, moderate, 305.10  (F17.200).     TRANSITION DIAGNOSES:  Alcohol use disorder, severe (F10.20) and tobacco use disorder, moderate, 305.10  (F17.200).        DISCHARGE STATUS:  NA -  Client is transferring to the Evening Outpatient Phase III Aftercare Program at [facility] on Tuesday, 01/08/2019 with Suzi Juares MA, LADC.    LAST USE DATE:  At the time of transition, clients last reported use date was 09/10/2018 for alcohol.       HOURS OF TREATMENT COMPLETED AT THE TIME OF TRANSITION:  Client has attended 16 sessions of Phase I (IOP), 12 sessions of Phase II (OP) and 3 individual sessions for a total of 75 hours of treatment completed at the time of transition.      PRESENTING INFORMATION:  Client was originally seen while on station 8A at the [facility] for CD evaluation at the request of the client due to him reporting that he needed inpatient CD treatment programming and  wanted to attend [facility] Program.  The client was admitted to the [facility] and subsequently completed the program on 10/16/2018.  Client had reported at that time to his [facility] treatment counselors that he wanted to attend [facility] Day Outpatient Co-occurring Program.      SERVICES PROVIDED:  Services included treatment and transitional planning, psychoeducation, recovery skills building, and individual and group therapy.  Client participated in an orientation session, a treatment planning session, group sessions where he was exposed to a variety of therapeutic techniques including behavioral modification, cognitive behavioral therapy, motivational enhancement therapy, mindfulness, 12-step facilitation, and group feedback.  Client was exposed to a variety of topics and assignments including but not limited to communication, relationships, meditation, stress reduction, relapse prevention, and sober support.      ISSUES ADDRESSED IN TREATMENT:     DIMENSION 1:  Acute Intoxication and Withdrawal Potential:  Risk at admission:  0.  Risk at transition:  0.  Prior to admission, client completed [facility] Program on 10/16/2018.  Client reported his substance of choice was alcohol.  At time of admission to the outpatient co-occurring program, client reported his last use date being 09/10/2018.  Upon admission, the client denied any withdrawal symptoms that would interfere with full participation in any recovery program as well as the client's recovery lifestyle.  The goal in this dimension is for client to develop effective strategies to maintain sobriety.  Treatment strategies for this dimension was for the client to report to counselor and to the group any use of alcohol and/or drugs.  The outcome of this goal is incomplete at the time of transition.  Date Completed:  In progress.      DIMENSION 2:  Biomedical Conditions and Complications:   "Risk at admission:  0.  Risk at transition:  0.  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  Client reports that his condition is under control so long as he watches his diet and that he makes sure that there is no cross contamination of food that he eats and/or drinks.  Client reported having a primary care provider.  Client reported taking his medications as prescribed.  Client reported having the capability and access to routine medical treatment as needed.  The goal in this dimension is for client to disclose this CD status to medical providers and follow up with medical interventions while in day outpatient program.  Treatment strategies for this dimension was for client to take medications as prescribed and to follow up with medical interventions as needed while the outpatient program.  The outcome of this goal is incomplete at the time of transition.  Date Completed:  In progress.      DIMENSION 3:  Emotional, Behavioral, Cognitive Conditions and Complications:  Risk at admission:  1.  Risk at transition:  1.  Upon admission, the client reported having a mental health diagnosis of major depressive disorder and generalized anxiety disorder.  At time of transition client was seeing [facility] staff psychotherapist, KRISS Narvaez at [facility].  Prior to admission, client completed a mental health diagnostic assessment on 10/08/2018 at the [facility] by Brock Lopez MA, LP, [facility].  At the time of the client's diagnostic assessment, he was given following diagnoses:  Major depressive disorder, recurrent episode, moderate, 296.32 (F33.1) and generalized anxiety disorder 300.02 (F41.1).  Prior to admission, client completed a suicide risk screen and rated at \"low risk.\"  Client denied any SI/SIB/SA/HI.  Client reported upon admission that he is " "struggling with reduced self-esteem and a negative sense of self.  Client reports having a mental health psychiatric prescriber and therapist at [facility].  Client completed a safety plan back on 09/19/2018 and at the time of admission to the day outpatient program client reports all information still being up to date so writer provided client with a copy of his safety plan.  The goal in this dimension was for the client to increase awareness and understanding of the relationship between mental health and substance use.  The client completed and presented to the group the following treatment strategies/assignments:  \"learning to self soothe, thinking traps, self-esteem packet, self worth packet, and realistic thinking.\"  At the time of transition client was still working on the following strategies/assignments:  journaling daily on his recovery progress, insights, challenges, and successes as well as a packet titled \"Understanding Depression and Addiction.\"  While in day outpatient program, the client met with Abelardo Kaur MA, LMFT, [facility] for individual psychotherapy. While in [facility] therapist assisted client with identifying where he could receive food support as he was having trouble securing food due to a lack of employment for most of his treatment involvement. Client also applied for [facility] financial and food support. Client was encouraged to schedule an appointment with a psychotherapist through [facility].  At the time of transition, the client was still working on securing a community psychotherapist appointment.  On transition, client has gained insight into how his mental health impacts his substance use and vice versa.  On transition, the client also appeared to be practicing healthier boundaries.  The outcome of this goal is incomplete at the time of transition.  Date Completed:  In progress.      DIMENSION 4:  Readiness to Change:  Risk at admission:  0.  Risk at transitions:  " "0.  Upon admission, the client reports his primary motivation for recovery was to allow himself time to heal physically.  While in [facility] treatment, the client reported high levels of motivation for recovery; however, the client appeared to show complacency in his recovery lifestyle.  At the time of admission to the day outpatient program, the client appeared to be in \"action\" stage within the stages of change model.  The goal in this dimension was for the client to increase awareness with how his substance use is conflicted with personal values and address any ambivalence to change.  The client completed and presented to the group the following treatment strategies/assignments:  \"What my ideal life look like?\"  and \"5-5-3 values, consequences and insane behaviors with a drug use history.\"  Upon transition the client demonstrated insight into the consequences of use has had on his physical and mental health.  At the time of transition, the client also demonstrated willingness to follow through on recommendations and referrals as well as willingness to try new coping skills.  The outcome of this goal was effective.  Date Completed: 12/17/2018.        DIMENSION 5:  Relapse, Continued Use, Continued Problem Potential:  Risk at admission:  3.  Risk at Transition:  2.  Upon admission, the client had reported that he attended 1 past CD treatment program in 2016 and relapsed a week after he discharged while at a family wedding.  The client reported he just completed the [facility] residential treatment program so at the time of admission the client reports completing 2 full residential treatment programs.  Prior to admission, the client reported he lacked sober coping skills and impulse control.  The client reported lacking sober support network outside of his wife.  The client reported that all of his family drinks alcohol.  Prior to admission, the client also reported lacking knowledge of his relapse " "triggers and warning signs.  The client also reported minimal 12-step support group participation prior to entering [facility].  The goal in this dimension was for the client to increase awareness of the disease concept of addiction, and insight into his personal relapse process, triggers and strategies to address.  The client was given the following treatment strategies/assignments:  \"5 years' using  versus 5 years' sober\" and  \"identifying relapse triggers and cues.\"   On transition, the client did complete and present to the group the assignment titled \"5 years' using versus 5 years' sober\", but was still working on identifying relapse triggers and cues at the time of transition.  However upon transition, the client appeared to have gained insight into identifying his relapse triggers and cues.  The client showed insight into how his use has impacted his physical and mental health as well as how it has negatively impacted his relationships.  The client has gained a greater understanding of self-care and how it affects his recovery.  Throughout phases I and II, the client reported that he inherently likes to help others and to give to others as a way for him to provide service work and help others.  The client gained increased insight and became more adept at balancing his altruistic behaviors with newly acquired understanding of self-care.  The outcome of this goal is incomplete at the time of transition.  Date Completed:  In progress.      DIMENSION 6:  Recovery Environment:  Risk at admission:  2.  Risk at transition:  2.  Upon admission, the client reported that he was living with his wife of 11 years and reported that they both lived in an apartment on the [facility] the [facility] in [place], [place].  The client reported that he and his wife do not have any children.  The client reported he was unemployed after being fired from a job back 04/2018 for smelling of alcohol.  The client reported his wife " "was concerned about his drinking prior to his admission to [facility].  Prior to admission, the client also denied having any legal issues of any kind.  The client reported lacking a sober support group and denied attending any sober support group meetings of any kind in the months preceding his admission to [facility].  The goal in this dimension was identified by the client and that was to expand his sober network, get involved in sober activities, and to obtain employment.  The following treatment strategies were assigned to the client:  1.  Attend 2-3 sober support group meetings weekly and to meet new sober supportive individuals to expand his sober support network while at these meetings.  2.  To read a packet titled \"Reaching Out\" and then write 1 page on the insight gained and then share it with the group.  3.  To complete the \"My Recovery Care Plan\" assignment and present this to the group.  At the time of transition, the client has been attending 5+ sober support group meetings weekly as well as meeting new people to expand his sober support network.  At the time of transition, the client was also able to obtain 2 jobs in which the client reported working a little over full-time between the 2 jobs.  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  At the time of transition, the client still needs to complete assignments titled \"Reaching Out\" and \"My Recovery Care Plan\" assignments.  Upon transition, the client demonstrated improved assertive communication skills.  Upon transition, the client reports that he will continue to live with his wife in the apartment they have on the [address].  The client has shown that he has been able to develop a daily structure and meaningful activities while in day outpatient program.  The outcome of  this goal was incomplete at the time of transition.  Date Completed:  In progress.      STRENGTHS:  At the time of transition client was personable, " hardworking, and strongly motivated to succeed in recovery.      PROGNOSIS:  Favorable on condition the client continues to follow all continuing care recommendations as well as continuing Phase III aftercare treatment at the [facility].      LIVING ARRANGEMENTS AT TRANSITION:  The client will continue living with his wife in the apartment they have on the [facility] in [place], [place].      CONTINUING CARE RECOMMENDATIONS:  At the time of transition:     1.  To abstain from use of all mood-altering chemicals unless prescribed by a medical doctor.   2.  To continue to attend weekly sober support group meetings and maintain contact with sober supportive individuals.   3.  To continue individual psychotherapy with a community therapist (through [facility]).   4.  Continue and complete Phase III aftercare programming in the Evening Outpatient Program at [facility].         This information has been disclosed to you from records protected by Federal confidentiality rules (42 CFR part 2). The Federal rules prohibit you from making any further disclosure of this information unless further disclosure is expressly permitted by the written consent of the person to whom it pertains or as otherwise permitted by 42 CFR part 2. A general authorization for the release of medical or other information is NOT sufficient for this purpose. The Federal rules restrict any use of the information to criminally investigate or prosecute any alcohol or drug abuse patient.      VENKAT ELLINGTON MA, LMFT, [facility]        D: 2019   T: 2019   MT: JESSICA      Name:     JAMES LOW   MRN:      [id number]        Account:      [id number]   :      1985           Visit Date:   2019      Document: [id number]

## 2019-01-16 ENCOUNTER — HOSPITAL ENCOUNTER (OUTPATIENT)
Dept: BEHAVIORAL HEALTH | Facility: CLINIC | Age: 34
End: 2019-01-16
Attending: SOCIAL WORKER
Payer: COMMERCIAL

## 2019-01-16 PROCEDURE — H2035 A/D TX PROGRAM, PER HOUR: HCPCS | Mod: HQ

## 2019-01-17 NOTE — PROGRESS NOTES
"Key to reading dimensions:   -Bolded text in a dimension indicates information about a client at service initiation.  -Underlined portions in a dimension indicate important information that writer is carrying forward from week to week as a reminder.    CD ADULT Progress Note     Treatment Plan Review completed on:  1.17.19    Attendance Dates: 1.8.2019     Total # of P1 Group Sessions: 16.  Total # of P2 Group Sessions: 12  Total # of P3 Group Sessions: 2  Total # of 1:1 Sessions: 4     Length of stay/projected discharge date: 02/12/19.      MONDAY TUESDAY WEDNESDAY THURSDAY FRIDAY SATURDAY SUNDAY TOTAL HOURS   Group Therapy    2     2   Specialty Groups*            1:1            Family Program           [place]             Phase III             Absent (place \"X\")             Total's   2     2     *Specialty Groups include Mental Health Care, Assertiveness and Communication, Sobriety Maintenance Skills, Spiritual Care, Stress Management, Relapse Prevention, Family Systems.                             Learning Style:    Visual  Hands on  Verbal  Demonstration    Staff member contributing: Suzi Juares MA,[facility]     Received supervision: No.    Client contributed to goals and plan: Yes    Client received a signed copy of treatment plan/revised plan:  Yes     Changes to Treatment Plan: No    Client agrees with plan/revised plan:  Yes     Any changes in Vulnerable Adult Status:  No    1) Care Coordination Activities: Yes (explain) - With fellow Phase III counselor to coordinate care.   2) Medical, Mental Health and other appointments the client attended: SEE DIM's II & III below.  3) Medication issues: No None.  4) Physical and mental health problems: SEE DIM's II & III below.  5) Review and evaluation of the individual abuse prevention plan: N/A     Substance Use Disorders:    303.90 (F10.20) Alcohol Use Disorder Severe (Early Remission)    [facility] Risk Ratings and Data     DIMENSION 1: Acute Intoxication/Withdrawal  The " "client's ability to cope with withdrawal symptoms and current state of intoxication     Acute Intoxication/Withdrawal - Current Risk Factor:  0    Reporting a sober date of: 09/11/18 for alcohol.    Goals:  Develop effective strategies to maintain sobriety and to report any substance or alcohol use to both counselor and group.     Data: Client denied nor demonstrated any signs/symptomology of intoxication and/or withdrawal. Client denied having any symptoms of PAW. Client confirmed his last use date was on 09/10/18 for alcohol.     DIMENSION 2:  Biomedical Conditions and Complaints  The degree to which any physical disorder would interfere with treatment for substance abuse and the client's ability to tolerate any related discomfort     Biomedical Conditions and Complaints - Current Risk Factor:  0    Goals: Disclose CD status to medical providers and follow up with medical interventions while in DOP.     Data: Client rated his overall health on a scale of 1-10 (1=poor, 10=excellent): 9. Client reports doing the following for self-care during the past week; \"working, sleeping and meds.\" Client reports the following changes to his physical health over the past week; \"None\". Client reports the following upcoming doctor s appointments: None.     > Client reports the following physical health conditions;  Past Medical History:   Diagnosis Date     ACD (adult celiac disease)      Anxiety      Celiac disease      Depressive disorder      Hypertension      > Client reports the taking the following medications:  Current Outpatient Prescriptions   Medication     amLODIPine (NORVASC) 5 MG tablet     escitalopram (LEXAPRO) 10 MG tablet     hydrOXYzine (ATARAX) 25 MG tablet     MIRTAZAPINE PO     multivitamin, therapeutic with minerals (THERA-VIT-M) TABS tablet     nicotine (NICODERM CQ) 14 MG/24HR 24 hr patch     nicotine (NICODERM CQ) 7 MG/24HR 24 hr patch     thiamine 100 MG tablet   Added: Bupropion from appointment with " Psychiatrist.     DIMENSION 3:  Emotional/Behavioral/Cognitive Conditions and Complications  The degree to which any condition or complications are likely to interfere with treatment for substance abuse or with function in significant life areas and the likelihood of risk of harm to self or others.     Emotional/Behavioral - Current Risk Factor:  1    DSM-5 Diagnoses:   Official diagnosis (es) [facility];   > 296.32 (F33.1) Major Depressive Disorder, Recurrent Episode, Moderate   > Client reports experiencing;   1. Depressed mood most of the day, nearly every day, as indicated by either subjective report (e.g., feels sad, empty, hopeless) or observation made by others (e.g., appears tearful). (Note: In children and adolescents, can be irritable mood.)  2. Markedly diminished interest or pleasure in all, or almost all, activities most of the day, nearly every day (as indicated by either subjective account or observation).  3. Significant weight loss when not dieting or weight gain (e.g., a change of more than 5% of body weight in a month), or decrease or increase in appetite nearly every day. (Note: In children, consider failure to make expected weight gain.)  4. Insomnia or hypersomnia nearly every day.  5. Fatigue or loss of energy nearly every day.  6. Feelings of worthlessness or excessive or inappropriate guilt (which may be delusional) nearly every day (not merely self-reproach or guilt about being sick).  7. Diminished ability to think or concentrate, or indecisiveness, nearly every day (either by subjective account or as observed by others).  8. Recurrent thoughts of death (not just fear of dying), recurrent suicidal ideation without a specific plan, or a suicide attempt or a specific plan for committing suicide.    > 300.02 (F41.1) Generalized Anxiety Disorder   9. Client has had excessive anxiety and worry (apprehensive expectation), occurring more days than not for at  "least 6 months, about a number of events or activities (such as work or school performance).   10. The Client finds it difficult to control the worry.   11. The anxiety and worry are associated with three (or more) of the following six symptoms (with at least some symptoms having been present for more days than not for the past 6 months): (Client reports having five of the six)  1. Restlessness or feeling keyed up or on edge.  2. Being easily fatigued.  3. Difficulty concentrating or mind going blank.  4. Irritability.  5. Sleep disturbance (difficulty falling or staying asleep, or restless, unsatisfying sleep).  1. Client reports the anxiety, worry, or physical symptoms have caused clinically significant distress or impairment in social, occupational, or other important areas of functioning.    Goals: Understand the relationship between mental health concerns and addiction.     Data: Client reports feeling the following two emotions today; \"cautious and no secondary emotions noted\" Client rated the following MH symptoms on a scale of 1-10 (0=did not endorse): sleeplessness- 3, fatigue- 1, difficulty concentrating- 1, mood swings- 1, irritability- 2, sadness- 1, excessive sleep- 3, racing thoughts- 1, hopelessness- 1. Client reports taking the following psychotropic medications; Hydroxyzine, Bupropion, and Lexapro.Client denied suicidal ideation or self-injurious behavior.    I:  Counselor recommended client to discuss his new antidepressant medications as client indicated having less energy, more tired than he used to be with his doctor.  Client admitted that he has no energy to get out of his house if he is not scheduled to be anywhere.  Client assessed for SI/HI.  Recommendation also to obtain a therapist.    A:  Client reports the following appointment with his psychiatrist; 01/17/19 W/ Dr. Paul MD @ [facility].  Client indicated that he will check with his doctor to see if he stop his new antidepressant " "medications since he is reporting having less energy and is more tired.  Client indicated that he will also ask for a referral for a therapist at [facility] as well.     >Client reports his risk factors are; \"MH diagnosis with lack of historical treatment of MI, early recovery, significant physical health concern (Celiacs disease), and unemployment\".    >Client reports his protective factors are; \"sense of responsibility to family, motivation towards receiving treatment, and sober support group participation\".     P:  Will inform counselor of his medication change, whether he was able to obtain a mental health counselor.     Guns in the home?: No.     DIMENSION 4:  Readiness to Change  Consider the amount of support and encouragement necessary to keep the client involved in treatment.     Readiness to Change - Current Risk Factor:  0    Goals:  Increase awareness with how substance use is conflicted with personal values and address any ambivalence to change.     Data: Client rated his motivation of the week on a scale of 1-10 (1=low, 10= high): 8. Client reports his main motivation for sobriety is \"my health \". Client reports \"nothing\" is what blocks motivation for sobriety for him. Client reports using the following coping strategies that are helpful for his continued sobriety; \"breathing\".  Client was able to share his first page of his \"Personal Recovery Care Plan.   Stages of Change Model: Client is currently in the Action Stage.                                                                                                DIMENSION 5:  Relapse/Continued Use/Continued Problem Potential  Consider the degree to which the client recognizes relapse issues and has the skills to prevent relapse of either substance use or mental health problems.     Relapse/Continued Use/Continued Problem Potential - Current Risk Factor:  2    Goals:  Increase awareness of the disease concept of addiction and insight into personal relapse " "triggers and strategies to address.     Data: Client rated his \"Average Craving Rating\" on a scale of 1-10 (1=low, 10= high): 2. Client reports that the following things helped minimize his cravings this past week; \"thinking it through\" Client reported \"stress- cash\" was his main any trigger this past week.    I:  Client was given Personal Recovery Care Plan, Page 1.  Client was able to effectively identify what his 3 goals would be, how his goals would be affected if he return to use, understanding his warning signs along with checking himself to see if he is on the right track to reach his goals.  A:  Client demonstrates working towards his goals of 1.  Developing healthy relationship; improve self-respect, and my health; he will continue to attend his check-up's, be more self-motivated, be more active and eating well.  Client admits that his warning signs would be \"isolating and have an \"Don't care attitude and feels down and lazy.\"     P:  He will continue to finish his Personal Recovery Care Plan and share in his group for his last night of group.    DIMENSION 6:  Recovery Environment  Consider the degree to which key areas of the client's life are supportive of or antagonistic to treatment participation and recovery.     Recovery Environment - Current Risk Factor: 2    Support group meetings attended this week: 3. Where: [facility].  If zero attendance, what s preventing you: N/A.    Do you currently have a sponsor: Yes  Did you have contact with your sponsor this week? Yes    Did family agree to attend family week:  NA    If yes: NA    Goals:  Increase sober support network. (Completed obtaining employment)    Data: Client rated their \"Living Situation\" on a scale of 1-10 (1=very helpful, 10=very stressful): 2. Client reports during the past week that he \"meetings\" as a way to expand his sober support network. Client reports he enjoyed, \"going to meetings\" for sober fun activities this " "past week. Client reported that he is reading the \"Big Book wit hhis sponsor.      >Client reports that he and his wife live in an on campus apartment at [facility] where is wife attends school. Client reports everything is supportive.  Employment: Client reports he is employed full-time. Client has secured two jobs in last couple of weeks and is working a little over FT.   Volunteerism: Yes (explain) - \"Am the Trustee for one of my AA meetings\".    Intervention: Counselor/therapist used Behavioral Therapy, Cognitive Behavioral Therapy, Counselor Feedback, Education, Emotional management, Group Feedback, Mindfulness, Motivational Enhancement Therapy, Relapse Prevention, Twelve Step Facilitation, DBT and REBT.  Plan:   -Client will attend all weekly Phase 3 group sessions   -Continue to attend 2-3 sober support group meetings each week (this includes non-12-step/AA alternative meetings).  -Client will meet with his therapist bi-weekly. Client will need to talk to [facility] about getting set-up with a therapist as he see's a psychiatrist there already.   -Client will continue to work on treatment plan objectives.  -Client will follow all recommendations of counselor and any other medical provider which includes taking all medications as prescribed.    -Client to engage in sober activities each week.       Intervention: Counselor/therapist used Behavioral Therapy, Cognitive Behavioral Therapy, Counselor Feedback, Education, Emotional management, Group Feedback, Mindfulness, Motivational Enhancement Therapy, Relapse Prevention, Twelve Step Facilitation, DBT and REBT.    Suzi Juares MA, [facility]  "

## 2019-01-23 ENCOUNTER — HOSPITAL ENCOUNTER (OUTPATIENT)
Dept: BEHAVIORAL HEALTH | Facility: CLINIC | Age: 34
End: 2019-01-23
Attending: SOCIAL WORKER
Payer: COMMERCIAL

## 2019-01-23 PROCEDURE — H2035 A/D TX PROGRAM, PER HOUR: HCPCS | Mod: HQ

## 2019-01-24 NOTE — PROGRESS NOTES
"Key to reading dimensions:   -Bolded text in a dimension indicates information about a client at service initiation.  -Underlined portions in a dimension indicate important information that writer is carrying forward from week to week as a reminder.    CD ADULT Progress Note     Treatment Plan Review completed on:  1.24.19    Attendance Dates: 1.23.2019     Total # of P1 Group Sessions: 16.  Total # of P2 Group Sessions: 12  Total # of P3 Group Sessions: 3  Total # of 1:1 Sessions: 4     Length of stay/projected discharge date: 02/12/19.      MONDAY TUESDAY WEDNESDAY THURSDAY FRIDAY SATURDAY SUNDAY TOTAL HOURS   Group Therapy    2     2   Specialty Groups*            1:1            Family Program           [place]             Phase III             Absent (place \"X\")             Total's   2     2     *Specialty Groups include Mental Health Care, Assertiveness and Communication, Sobriety Maintenance Skills, Spiritual Care, Stress Management, Relapse Prevention, Family Systems.                             Learning Style:    Verbal    Staff member contributing: Neela Camp MS, ROSEMARY    Received supervision: No.    Client contributed to goals and plan: Yes    Client received a signed copy of treatment plan/revised plan:  Yes     Changes to Treatment Plan: No    Client agrees with plan/revised plan:  Yes     Any changes in Vulnerable Adult Status:  No    1) Care Coordination Activities: No   2) Medical, Mental Health and other appointments the client attended: SEE DIM's II & III below.  3) Medication issues: No None.  4) Physical and mental health problems: SEE DIM's II & III below.  5) Review and evaluation of the individual abuse prevention plan: N/A     Substance Use Disorders:    303.90 (F10.20) Alcohol Use Disorder Severe (Early Remission)    [facility] [place] Risk Ratings and Data     DIMENSION 1: Acute Intoxication/Withdrawal  The client's ability to cope with withdrawal symptoms and current state of intoxication     Acute " "Intoxication/Withdrawal - Current Risk Factor:  0    Reporting a sober date of: 09/11/18 for alcohol.    Goals:  Develop effective strategies to maintain sobriety and to report any substance or alcohol use to both counselor and group.     Data: Client denied nor demonstrated any signs/symptomology of intoxication and/or withdrawal. Client denied having any symptoms of PAW. Client confirmed his last use date was on 09/10/18 for alcohol.     DIMENSION 2:  Biomedical Conditions and Complaints  The degree to which any physical disorder would interfere with treatment for substance abuse and the client's ability to tolerate any related discomfort     Biomedical Conditions and Complaints - Current Risk Factor:  0    Goals: Disclose CD status to medical providers and follow up with medical interventions while in DOP.     Data: Client rated his overall health on a scale of 1-10 (1=poor, 10=excellent): 8. Client reports doing the following for self-care during the past week; \"working, sleeping, meetings, and meds.\" Client reports the following changes to his physical health over the past week; \"None\". Client reports the following upcoming doctor s appointments: None.     > Client reports the following physical health conditions;  Past Medical History:   Diagnosis Date     ACD (adult celiac disease)      Anxiety      Celiac disease      Depressive disorder      Hypertension      > Client reports the taking the following medications:  Current Outpatient Prescriptions   Medication     amLODIPine (NORVASC) 5 MG tablet     escitalopram (LEXAPRO) 10 MG tablet     hydrOXYzine (ATARAX) 25 MG tablet     MIRTAZAPINE PO     multivitamin, therapeutic with minerals (THERA-VIT-M) TABS tablet     nicotine (NICODERM CQ) 14 MG/24HR 24 hr patch     nicotine (NICODERM CQ) 7 MG/24HR 24 hr patch     thiamine 100 MG tablet   Added: Bupropion from appointment with Psychiatrist.     DIMENSION 3:  Emotional/Behavioral/Cognitive Conditions and " Complications  The degree to which any condition or complications are likely to interfere with treatment for substance abuse or with function in significant life areas and the likelihood of risk of harm to self or others.     Emotional/Behavioral - Current Risk Factor:  1    DSM-5 Diagnoses:   Official diagnosis (es) per Luis Armando Lopez MA, [facility], [facility] on 10/08/18;   > 296.32 (F33.1) Major Depressive Disorder, Recurrent Episode, Moderate   > Client reports experiencing;   1. Depressed mood most of the day, nearly every day, as indicated by either subjective report (e.g., feels sad, empty, hopeless) or observation made by others (e.g., appears tearful). (Note: In children and adolescents, can be irritable mood.)  2. Markedly diminished interest or pleasure in all, or almost all, activities most of the day, nearly every day (as indicated by either subjective account or observation).  3. Significant weight loss when not dieting or weight gain (e.g., a change of more than 5% of body weight in a month), or decrease or increase in appetite nearly every day. (Note: In children, consider failure to make expected weight gain.)  4. Insomnia or hypersomnia nearly every day.  5. Fatigue or loss of energy nearly every day.  6. Feelings of worthlessness or excessive or inappropriate guilt (which may be delusional) nearly every day (not merely self-reproach or guilt about being sick).  7. Diminished ability to think or concentrate, or indecisiveness, nearly every day (either by subjective account or as observed by others).  8. Recurrent thoughts of death (not just fear of dying), recurrent suicidal ideation without a specific plan, or a suicide attempt or a specific plan for committing suicide.    > 300.02 (F41.1) Generalized Anxiety Disorder   9. Client has had excessive anxiety and worry (apprehensive expectation), occurring more days than not for at least 6 months, about a number of events or activities (such as work or school  "performance).   10. The Client finds it difficult to control the worry.   11. The anxiety and worry are associated with three (or more) of the following six symptoms (with at least some symptoms having been present for more days than not for the past 6 months): (Client reports having five of the six)  1. Restlessness or feeling keyed up or on edge.  2. Being easily fatigued.  3. Difficulty concentrating or mind going blank.  4. Irritability.  5. Sleep disturbance (difficulty falling or staying asleep, or restless, unsatisfying sleep).  1. Client reports the anxiety, worry, or physical symptoms have caused clinically significant distress or impairment in social, occupational, or other important areas of functioning.    Goals: Understand the relationship between mental health concerns and addiction.     Data: Client reports feeling the following two emotions today; \"mischievous and confident\" Client rated the following MH symptoms on a scale of 1-10 (0=did not endorse): sleeplessness- 1, fatigue- 1, difficulty concentrating- 1, mood swings- 2, irritability- 1, sadness- 3, excessive sleep- 3, racing thoughts- 2, hopelessness- 2. Client reports taking the following psychotropic medications; Hydroxyzine, Bupropion, and Lexapro.Client denied suicidal ideation or self-injurious behavior.    I:  No MH interventions     A:  Client reports the following appointment with his psychiatrist; 1.31.2019. Client reports having an initial visit with psychotherapist at [facility] on 1.17.2019 and will be seeing them every Tuesday.     >Client reports his risk factors are; \"MH diagnosis with lack of historical treatment of MI, early recovery, significant physical health concern (Celiacs disease)\".    >Client reports his protective factors are; \"sense of responsibility to family, motivation towards receiving treatment, and sober support group participation\".     P:  Attend psychiatry and therapy appoinments.     Guns in the home?: No. " "    DIMENSION 4:  Readiness to Change  Consider the amount of support and encouragement necessary to keep the client involved in treatment.     Readiness to Change - Current Risk Factor:  0    Goals:  Increase awareness with how substance use is conflicted with personal values and address any ambivalence to change.     Data: Client rated his motivation of the week on a scale of 1-10 (1=low, 10= high): 8. Client reports his main motivation for sobriety is \"my health \". Client reports \"nothing\" is what blocks motivation for sobriety for him. Client reports using the following coping strategies that are helpful for his continued sobriety; \"breathing\".  Client was able to share his second page of his \"Personal Recovery Care Plan.   Stages of Change Model: Client is currently in the Action Stage.                                                                                                DIMENSION 5:  Relapse/Continued Use/Continued Problem Potential  Consider the degree to which the client recognizes relapse issues and has the skills to prevent relapse of either substance use or mental health problems.     Relapse/Continued Use/Continued Problem Potential - Current Risk Factor:  2    Goals:  Increase awareness of the disease concept of addiction and insight into personal relapse triggers and strategies to address.     Data: Client rated his \"Average Craving Rating\" on a scale of 1-10 (1=low, 10= high): 1. Client reports that the following things helped minimize his cravings this past week; \"not having it around\" Client reported \"passing liquor stores\" was his main any trigger this past week.    I:  Client was given Personal Recovery Care Plan, Page 3.  Client was able to effectively identify what his 3 goals would be, how his goals would be affected if he return to use, understanding his warning signs along with checking himself to see if he is on the right track to reach his goals.  A:  Client demonstrates working " "towards his goals of 1.  Developing healthy relationship; improve self-respect, and my health; he will continue to attend his check-up's, be more self-motivated, be more active and eating well.  Client admits that his warning signs would be \"isolating and have an \"Don't care attitude and feels down and lazy.\"     P:  He will continue to finish his Personal Recovery Care Plan and share in his group for his last night of group.    DIMENSION 6:  Recovery Environment  Consider the degree to which key areas of the client's life are supportive of or antagonistic to treatment participation and recovery.     Recovery Environment - Current Risk Factor: 2    Support group meetings attended this week: 6. Where: [facility], & [facility].  If zero attendance, what s preventing you: N/A.    Do you currently have a sponsor: Yes  Did you have contact with your sponsor this week? Yes    Did family agree to attend family week:  NA    If yes: NA    Goals:  Increase sober support network. (Completed obtaining employment)    Data: Client rated their \"Living Situation\" on a scale of 1-10 (1=very helpful, 10=very stressful): 8 because \"I need to clean it up\". Client reports during the past week that he \"meetings\" as a way to expand his sober support network. Client reports he enjoyed, \"going to meetings\" for sober fun activities this past week. Client reported that he is reading the \"Big Book wit [employer] sponsor.      >Client reports that he and his wife live in an on campus apartment at [facility] where is wife attends school. Client reports everything is supportive.  Employment: Client reports he is employed full-time. Client has secured two jobs in last couple of weeks and is working a little over FT.   Volunteerism: Yes (explain) - \"Am the Trustee for one of my AA meetings\".    Intervention: Counselor/therapist used Behavioral Therapy, Cognitive Behavioral Therapy, Counselor Feedback, Education, Emotional " management, Group Feedback, Mindfulness, Motivational Enhancement Therapy, Relapse Prevention, Twelve Step Facilitation, DBT and REBT.  Plan:   -Client will attend all weekly Phase 3 group sessions   -Continue to attend 2-3 sober support group meetings each week (this includes non-12-step/AA alternative meetings).  -Client will meet with his therapist bi-weekly. Client will need to talk to Shayan about getting set-up with a therapist as he see's a psychiatrist there already.   -Client will continue to work on treatment plan objectives.  -Client will follow all recommendations of counselor and any other medical provider which includes taking all medications as prescribed.    -Client to engage in sober activities each week.    Neela Camp MS, [facility]

## 2019-02-27 ENCOUNTER — HOSPITAL ENCOUNTER (OUTPATIENT)
Dept: BEHAVIORAL HEALTH | Facility: CLINIC | Age: 34
End: 2019-02-27
Attending: SOCIAL WORKER
Payer: COMMERCIAL

## 2019-02-27 PROCEDURE — H2035 A/D TX PROGRAM, PER HOUR: HCPCS | Mod: HQ

## 2019-02-28 NOTE — PROGRESS NOTES
"Key to reading dimensions:   -Bolded text in a dimension indicates information about a client at service initiation.  -Underlined portions in a dimension indicate important information that writer is carrying forward from week to week as a reminder.    CD ADULT Progress Note     Treatment Plan Review completed on:  2.28.2019    Attendance Dates: Group cancelled due to extreme weather     Total # of P1 Group Sessions: 16.  Total # of P2 Group Sessions: 12  Total # of P3 Group Sessions: 4  Total # of 1:1 Sessions: 4     Length of stay/projected discharge date: 3.6.2019     MONDAY TUESDAY WEDNESDAY THURSDAY FRIDAY SATURDAY [facility]A TOTAL HOURS   Group Therapy    X        Specialty Groups*            1:1            Family Program           [place]             Phase III             Absent (place \"X\")             Total's        2     *Specialty Groups include Mental Health Care, Assertiveness and Communication, Sobriety Maintenance Skills, Spiritual Care, Stress Management, Relapse Prevention, Family Systems.                             Learning Style:    Verbal    Staff member contributing: Neela Camp MS, ROSEMARY    Received supervision: No.    Client contributed to goals and plan: Yes    Client received a signed copy of treatment plan/revised plan:  Yes     Changes to Treatment Plan: No    Client agrees with plan/revised plan:  Yes     Any changes in Vulnerable Adult Status:  No    1) Care Coordination Activities: No   2) Medical, Mental Health and other appointments the client attended: SEE DIM's II & III below.  3) Medication issues: No None.  4) Physical and mental health problems: SEE DIM's II & III below.  5) Review and evaluation of the individual abuse prevention plan: Yes    Substance Use Disorders:    303.90 (F10.20) Alcohol Use Disorder Severe (Early Remission)    [facility] Risk Ratings and Data     DIMENSION 1: Acute Intoxication/Withdrawal  The client's ability to cope with withdrawal symptoms and current state " "of intoxication     Acute Intoxication/Withdrawal - Current Risk Factor:  0    Reporting a sober date of: 09/11/18 for alcohol.    Goals:  Develop effective strategies to maintain sobriety and to report any substance or alcohol use to both counselor and group.     Data: Client denied nor demonstrated any signs/symptomology of intoxication and/or withdrawal.  Client confirmed his last use date was on 09/10/18 for alcohol.     DIMENSION 2:  Biomedical Conditions and Complaints  The degree to which any physical disorder would interfere with treatment for substance abuse and the client's ability to tolerate any related discomfort     Biomedical Conditions and Complaints - Current Risk Factor:  0    Goals: Disclose CD status to medical providers and follow up with medical interventions while in DOP.     Data: Client rated his overall health on a scale of 1-10 (1=poor, 10=excellent): 8. Client reports doing the following for self-care during the past week; \"working, sleeping, meetings, and meds.\" Client reports the following changes to his physical health over the past week; \"None\". Client reports the following upcoming doctor s appointments: None.     > Client reports the following physical health conditions;  Past Medical History:   Diagnosis Date     ACD (adult celiac disease)      Anxiety      Celiac disease      Depressive disorder      Hypertension      > Client reports the taking the following medications:  Current Outpatient Prescriptions   Medication     amLODIPine (NORVASC) 5 MG tablet     escitalopram (LEXAPRO) 10 MG tablet     hydrOXYzine (ATARAX) 25 MG tablet     MIRTAZAPINE PO     multivitamin, therapeutic with minerals (THERA-VIT-M) TABS tablet     nicotine (NICODERM CQ) 14 MG/24HR 24 hr patch     nicotine (NICODERM CQ) 7 MG/24HR 24 hr patch     thiamine 100 MG tablet   Added: Bupropion from appointment with Psychiatrist.     DIMENSION 3:  Emotional/Behavioral/Cognitive Conditions and Complications  The " degree to which any condition or complications are likely to interfere with treatment for substance abuse or with function in significant life areas and the likelihood of risk of harm to self or others.     Emotional/Behavioral - Current Risk Factor:  1    DSM-5 Diagnoses:   Official diagnosis (es) per Luis Armando Lopez MA, [facility], [facility] on 10/08/18;   > 296.32 (F33.1) Major Depressive Disorder, Recurrent Episode, Moderate   > Client reports experiencing;   1. Depressed mood most of the day, nearly every day, as indicated by either subjective report (e.g., feels sad, empty, hopeless) or observation made by others (e.g., appears tearful). (Note: In children and adolescents, can be irritable mood.)  2. Markedly diminished interest or pleasure in all, or almost all, activities most of the day, nearly every day (as indicated by either subjective account or observation).  3. Significant weight loss when not dieting or weight gain (e.g., a change of more than 5% of body weight in a month), or decrease or increase in appetite nearly every day. (Note: In children, consider failure to make expected weight gain.)  4. Insomnia or hypersomnia nearly every day.  5. Fatigue or loss of energy nearly every day.  6. Feelings of worthlessness or excessive or inappropriate guilt (which may be delusional) nearly every day (not merely self-reproach or guilt about being sick).  7. Diminished ability to think or concentrate, or indecisiveness, nearly every day (either by subjective account or as observed by others).  8. Recurrent thoughts of death (not just fear of dying), recurrent suicidal ideation without a specific plan, or a suicide attempt or a specific plan for committing suicide.    > 300.02 (F41.1) Generalized Anxiety Disorder   9. Client has had excessive anxiety and worry (apprehensive expectation), occurring more days than not for at least 6 months, about a number of events or activities (such as work or school performance).  "  10. The Client finds it difficult to control the worry.   11. The anxiety and worry are associated with three (or more) of the following six symptoms (with at least some symptoms having been present for more days than not for the past 6 months): (Client reports having five of the six)  1. Restlessness or feeling keyed up or on edge.  2. Being easily fatigued.  3. Difficulty concentrating or mind going blank.  4. Irritability.  5. Sleep disturbance (difficulty falling or staying asleep, or restless, unsatisfying sleep).  1. Client reports the anxiety, worry, or physical symptoms have caused clinically significant distress or impairment in social, occupational, or other important areas of functioning.    Goals: Understand the relationship between mental health concerns and addiction.     Data: Client reports feeling the following two emotions today; \"content and proud\" Client rated the following MH symptoms on a scale of 1-10 (0=did not endorse): sleeplessness- 1, fatigue- 1, difficulty concentrating- 1, mood swings- 2, irritability- 1, sadness- 3, excessive sleep- 3, racing thoughts- 2, hopelessness- 2. Client reports taking the following psychotropic medications; Hydroxyzine, Bupropion, and Lexapro.Client denied suicidal ideation or self-injurious behavior.    I:  No MH interventions     A:  Client reports the following appointment with his psychiatrist; 2.28.2019    >Client reports his risk factors are; \"MH diagnosis with lack of historical treatment of MI, early recovery, significant physical health concern (Celiacs disease)\".    >Client reports his protective factors are; \"sense of responsibility to family, motivation towards receiving treatment, and sober support group participation\".     P:  Attend psychiatry and therapy appoinments.     Guns in the home?: No.     DIMENSION 4:  Readiness to Change  Consider the amount of support and encouragement necessary to keep the client involved in treatment. " "    Readiness to Change - Current Risk Factor:  0    Goals:  Increase awareness with how substance use is conflicted with personal values and address any ambivalence to change.     Data: Client rated his motivation of the week on a scale of 1-10 (1=low, 10= high): 8. Client reports his main motivation for sobriety is \"my health and my new job \". Client reports \"nothing\" is what blocks motivation for sobriety for him. Client reports using the following coping strategies that are helpful for his continued sobriety; \"breathing and music\".                                                               DIMENSION 5:  Relapse/Continued Use/Continued Problem Potential  Consider the degree to which the client recognizes relapse issues and has the skills to prevent relapse of either substance use or mental health problems.     Relapse/Continued Use/Continued Problem Potential - Current Risk Factor:  2    Goals:  Increase awareness of the disease concept of addiction and insight into personal relapse triggers and strategies to address.     Data: Client rated his \"Average Craving Rating\" on a scale of 1-10 (1=low, 10= high): 1. Client reports that the following things helped minimize his cravings this past week; \"not having it around\" Client reported \"passing liquor stores\" was his main any trigger this past week.    I:  Client shared that his cravings are \"non-existent\" and is enjoying being sober and having a strong sober support network.     A:  Client appeared content and engaged during group session. Client offered insight into triggers that cause him to have cravings to drink with new group member.      P:  He will continue to finish his Personal Recovery Care Plan and share in his group for his last night of group.    DIMENSION 6:  Recovery Environment  Consider the degree to which key areas of the client's life are supportive of or antagonistic to treatment participation and recovery.     Recovery Environment - Current " "Risk Factor: 2    Support group meetings attended this week: 6. [facility]: [facility], & [facility].  If zero attendance, what s preventing you: N/A.    Do you currently have a sponsor: Yes  Did you have contact with your sponsor this week? Yes    Did family agree to attend family week:  NA    If yes: NA    Goals:  Increase sober support network. (Completed obtaining employment)    Data: Client rated their \"Living Situation\" on a scale of 1-10 (1=very helpful, 10=very stressful): 2. because \"I've been doing better with picking up stuff around my apartment\". Client reports during the past week that he \"meetings\" as a way to expand his sober support network. Client reports he enjoyed, \"going to meetings\" for sober fun activities this past week. Client reported that he is reading the \"Big Book wit [employer] sponsor.      >Client reports that he and his wife live in an on campus apartment at [facility] where is wife attends school. Client reports everything is supportive.  Employment: Client reports he is employed full-time.    Volunteerism: Yes (explain) - \"Am the Trustee for one of my AA meetings\".    Intervention: Counselor/therapist used Behavioral Therapy, Cognitive Behavioral Therapy, Counselor Feedback, Education, Emotional management, Group Feedback, Mindfulness, Motivational Enhancement Therapy, Relapse Prevention, Twelve Step Facilitation, DBT and REBT.  Plan:   -Client will attend all weekly Phase 3 group sessions   -Continue to attend 2-3 sober support group meetings each week (this includes non-12-step/AA alternative meetings).  -Client will meet with his therapist bi-weekly. Client will need to talk to Shayan about getting set-up with a therapist as he see's a psychiatrist there already.   -Client will continue to work on treatment plan objectives.  -Client will follow all recommendations of counselor and any other medical provider which includes taking all medications as prescribed.  "   -Client to engage in sober activities each week.    Neela Camp MS, [facility]

## 2019-03-06 ENCOUNTER — HOSPITAL ENCOUNTER (OUTPATIENT)
Dept: BEHAVIORAL HEALTH | Facility: CLINIC | Age: 34
End: 2019-03-06
Attending: SOCIAL WORKER
Payer: COMMERCIAL

## 2019-03-06 PROCEDURE — H2035 A/D TX PROGRAM, PER HOUR: HCPCS

## 2019-03-07 NOTE — DISCHARGE SUMMARY
Client Name:  Glenn Simons    MRN:  [id number]  : 1985    -CHEMICAL DEPENDENCY DISCHAGE SUMMARY -    EVALUATION COUNSELOR:  SHAY Cordero, [facility], [facility]    TREATMENT COUNSELOR: KRISS Blackmon, [facility] transferred to Suzi Juares MA, [facility] then transferred to Neela Camp MS, [facility]    REFERRAL SOURCE:   [facility]    PROGRAM: [facility] Outpatient program    ADMISSION DATE:  10.17.2018    DISCHARGE DATE:  3.6.2019    LAST SESSION DATE:  3.6.2019    ADMISSION DIAGNOSIS:     305.00 (F10.20) Alcohol Use Disorder Severe  305.10 (F17.200)  Tobacco Use Disorder Moderate     DISCHARGE DIAGNOSIS:    305.00 (F10.20) Alcohol Use Disorder Severe  305.10 (F17.200)  Tobacco Use Disorder Moderate     DISCHARGE STATUS:  Favorable    LAST USE DATE: 2018    HOURS OF TREATMENT COMPLETED &  PRESENTING INFORMATION:  Client transitioned to [facility] Outpatient Program after completing [facility] program. Client participated in 16 Phase 1 sessions, 12 Phase 2 sessions, 4 Phase 3 sessions, and 5 individual sessions with previous primary counselor.      SERVICES PROVIDED: Client participated in his treatment plan, attended group therapy sessions during which time he was exposed to a variety of therapeutic techniques including but not limited to the following:  Behavior modification, cognitive behavioral therapy, counselor feedback, education, emotional management, group feedback, motivational enhancement therapy, relapse prevention, 12-step facilitation, rational emotive behavioral therapy, and co-occurring group.    -ISSUES ADDRESSED IN TREATMENT-    DIMENSION 1 / ACUTE WITHDRAWAL ISSUES/DETOX:   Risk at Admission: 0  Risk at Discharge: 0    Client transitioned from [facility] program after completing 28 days successfully. Upon intake to outpatient, client identified goal of developing effective strategies to  maintain sobriety. During course of treatment and at time of discharge, client was able to remain abstinent from drugs and alcohol, as evidenced by negative UA s and breathalyzer testing being at 0.0 BAC.     DIMENSION 2 / BIOMEDICAL:   Risk at Admission: 0  Risk at Discharge: 0    Upon intake to outpatient program, client reported medical condition of Celiac Disease. During course of treatment and at time of discharge, client did not report any medical concerns or conditions and was able to access medical care, as needed.        DIMENSION 3 / EMOTIONAL / BEHAVIORAL:   Risk at Admission:  1   Risk at Discharge: 1    Upon intake to outpatient program, client reported diagnosis  of Major Depressive Disorder and Generalized Anxiety Disorder. Client reported taking prescribed psychotropic medications, such as,  Lexapro, Hydroxyzine, and Remeron During course of treatment, completed treatment assignments of  Thinking Traps ,  Self Esteem , Realistic Thinking , and Understanding Depression and Addiction , and shared these in group sessions. Client continued to work with therapist and psychiatrist at [facility]. At time of discharge, client reported that he will continue to attend his weekly appointments with his therapist and bi-monthly appointments with his psychiatrist for psychotropic medication management.     DIMENSION 4 / READINESS TO CHANGE:   Risk at Admission: 0  Risk at Discharge: 0    donny intake to outpatient program, client reported a history of relapses with little time of abstinence. Client completed 28 days in [facility] program and was committed to seek continued care with addiction by entering outpatient programming at [facility]. During course of treatment, client completed  What Would My Ideal Life Look Like?  and  Values, Consequences, and Insane Behaviors , treatment assignments and provided awareness and insight in group discussions. At time of discharge,  client reported desired motivation to continue his sobriety.     DIMENSION 5 / RELAPSE & CONTINUED PROBLEM POTENTIAL:   Risk at Admission: 3  Risk at Discharge: 1    Upon intake to outpatient program, client reported lack of insight into his relapse process along with early warning signs and triggers. During course of treatment, client increased his awareness of the disease concept of addictions and gained insight into his personal relapse process, triggers, and strategies to address these. Client completed  5 Years Using versus 5 Years Sober , and  Identifying Relapse Triggers and Cues  treatment assignments and presented in group sessions. At time of discharge, client demonstrated that he is able to recognize relapse issues and shared his personal prevention strategies. Client does display some vulnerability for further substance use due to formal mental health diagnosis.       DIMENSION 6 / RECOVERY ENVIRONMENT:   Risk at Admission:  2  Risk at Discharge: 1    Upon intake to outpatient program, client reported a lack of sober support and was not involved in any sober activities. During course of treatment, client identified goal of expanding sober network and to get involved in sober activities. Client was able to attend 2-3 sober support meetings. Client identified sober activities that he enjoys, as evidenced by his completion of  My Recovery Plan , and sharing insight. At time of discharge, client reported engagement in structured, meaningful activities, such as training for his new job at the [employer], and attended AA meetings 2-3x a week in his community.  Client reported that his wife and family continue to be supportive of his recovery.     STRENGTHS:  Insightful, knowledgeable, friendly    PROGNOSIS:  Favorable    LIVING ARRANGEMENTS AT DISCHARGE:   Wife who is a college student at [employer] and they live in Student Housing.    -CONTINUING CARE RECOMMENDATIONS-  1. ABSTAIN FROM  THE USE OF ALL MOOD-ALTERING CHEMICALS UNLESS PRESCRIBED BY A MEDICAL PROVIDER.  2. Continue to attend sober support meetings    3. Continue to work with your sponsor.       -Neela Camp MS, [facility]